# Patient Record
Sex: FEMALE | Race: WHITE | NOT HISPANIC OR LATINO | Employment: FULL TIME | URBAN - METROPOLITAN AREA
[De-identification: names, ages, dates, MRNs, and addresses within clinical notes are randomized per-mention and may not be internally consistent; named-entity substitution may affect disease eponyms.]

---

## 2017-02-27 ENCOUNTER — OFFICE VISIT (OUTPATIENT)
Dept: FAMILY MEDICINE | Facility: CLINIC | Age: 32
End: 2017-02-27
Payer: COMMERCIAL

## 2017-02-27 VITALS
HEIGHT: 67 IN | DIASTOLIC BLOOD PRESSURE: 62 MMHG | SYSTOLIC BLOOD PRESSURE: 100 MMHG | WEIGHT: 153 LBS | TEMPERATURE: 98.8 F | RESPIRATION RATE: 12 BRPM | HEART RATE: 66 BPM | BODY MASS INDEX: 24.01 KG/M2 | OXYGEN SATURATION: 98 %

## 2017-02-27 DIAGNOSIS — K64.5 THROMBOSED EXTERNAL HEMORRHOIDS: Primary | ICD-10-CM

## 2017-02-27 PROCEDURE — 99213 OFFICE O/P EST LOW 20 MIN: CPT | Performed by: FAMILY MEDICINE

## 2017-02-27 NOTE — PROGRESS NOTES
"  SUBJECTIVE:                                                    Susan Connolly is a 31 year old female who presents to clinic today for the following health issues:      Patient presents to the clinic for evaluation of a lump on her perineum area. Patient states that she first noticed the lump this weekend and it has been very painful the last 2 days. Patient denies any drainage from the lump. Denies any fever or chills.    Problem list and histories reviewed & adjusted, as indicated.  Additional history: as documented    Problem list, Medication list, Allergies, and Medical/Social/Surgical histories reviewed in EPIC and updated as appropriate.    ROS:  Constitutional, HEENT, cardiovascular, pulmonary, gi and gu systems are negative, except as otherwise noted.    OBJECTIVE:                                                    /62 (BP Location: Right arm, Patient Position: Chair, Cuff Size: Adult Regular)  Pulse 66  Temp 98.8  F (37.1  C) (Oral)  Resp 12  Ht 5' 6.5\" (1.689 m)  Wt 153 lb (69.4 kg)  SpO2 98%  BMI 24.32 kg/m2  Body mass index is 24.32 kg/(m^2).  GENERAL: healthy, alert and no distress  RECTAL (female): Thrombosed external hemorrhoid measuring approx 4-5mm     ASSESSMENT/PLAN:                                                      1. Thrombosed external hemorrhoids  - Discussed increasing fiber with diet change and supplements  - Get LOTS of water  - Miralax PRN  - OTC Hydrocortisone PRN itch  - Pain now controlled since hemorrhoid is smaller, so no need for analgesia    Follow up if symptoms are worsening or not improving    Heather Jeffery, DO  [facility]    "

## 2017-02-27 NOTE — MR AVS SNAPSHOT
"              After Visit Summary   2/27/2017    Susan Connolly    MRN: [id number]           Patient Information     Date Of Birth          1985        Visit Information        Provider Department      2/27/2017 1:45 PM Heather Jeffery,  [facility]        Today's Diagnoses     Thrombosed external hemorrhoids    -  1       Follow-ups after your visit        Who to contact     If you have questions or need follow up information about today's clinic visit or your schedule please contact [facility] directly at [phone].  Normal or non-critical lab and imaging results will be communicated to you by [employer], letter or phone within 4 business days after the clinic has received the results. If you do not hear from us within 7 days, please contact the clinic through [employer] or phone. If you have a critical or abnormal lab result, we will notify you by phone as soon as possible.  Submit refill requests through [employer] or call your pharmacy and they will forward the refill request to us. Please allow 3 business days for your refill to be completed.          Additional Information About Your Visit        MyChart Information     [employer] gives you secure access to your electronic health record. If you see a primary care provider, you can also send messages to your care team and make appointments. If you have questions, please call your primary care clinic.  If you do not have a primary care provider, please call [phone] and they will assist you.        Care EveryWhere ID     This is your Care EveryWhere ID. This could be used by other organizations to access your [facility] medical records  [id number]        Your Vitals Were     Pulse Temperature Respirations Height Pulse Oximetry BMI (Body Mass Index)    66 98.8  F (37.1  C) (Oral) 12 5' 6.5\" (1.689 m) 98% 24.32 kg/m2       Blood Pressure from Last 3 Encounters:   02/27/17 100/62   04/20/16 108/64   04/08/15 106/72    " Weight from Last 3 Encounters:   02/27/17 153 lb (69.4 kg)   04/20/16 147 lb (66.7 kg)   04/08/15 135 lb 9.6 oz (61.5 kg)              Today, you had the following     No orders found for display       Primary Care Provider Office Phone # Fax #    Heather Jeffery [phone]       [address] [address]        Thank you!     Thank you for choosing [facility]  for your care. Our goal is always to provide you with excellent care. Hearing back from our patients is one way we can continue to improve our services. Please take a few minutes to complete the written survey that you may receive in the mail after your visit with us. Thank you!             Your Updated Medication List - Protect others around you: Learn how to safely use, store and throw away your medicines at www.disposemymeds.org.          This list is accurate as of: 2/27/17  8:19 PM.  Always use your most recent med list.                   Brand Name Dispense Instructions for use    norgestim-eth estrad triphasic 0.18/0.215/0.25 MG-35 MCG per tablet    TRINESSA (28)    84 tablet    Take 1 tablet by mouth daily       VANICREAM SUNSCREEN 7.5-8 % Crea     100 g    Externally apply 2 g topically daily as needed

## 2017-04-24 DIAGNOSIS — Z00.00 ROUTINE GENERAL MEDICAL EXAMINATION AT A HEALTH CARE FACILITY: ICD-10-CM

## 2017-04-24 NOTE — LETTER
[facility]  [address] [place], [address]  [phone]        April 27, 2017      Susan Connolly  [address] [place] [address]        Dear Susan,    We recently received a call from your pharmacy requesting a refill of your medication.    A review of your chart indicates that an appointment is required with your provider for an annual physical. Please call the clinic at [phone] to schedule your appointment.    We have authorized one refill of your medication to allow time for you to schedule your appointment.    Taking care of your health is important to us, and ongoing visits with your provider are vital to your care.  We look forward to seeing you in the near future.    Heather Jeffery/ Sarah MORALZE RN, BSN, PHN

## 2017-04-24 NOTE — TELEPHONE ENCOUNTER
TRINESSA TABLETS 28S  Last Written Prescription Date: 06/26/2016  Last Fill Quantity: 84,01/30/2017  # refills: 3   Last Office Visit with [facility], [facility] or [facility] prescribing provider: 02/27/2017-Dr Jeffery

## 2017-04-27 RX ORDER — NORGESTIMATE-ETHINYL ESTRADIOL 7DAYSX3 28
TABLET ORAL
Qty: 28 TABLET | Refills: 0 | Status: SHIPPED | OUTPATIENT
Start: 2017-04-27 | End: 2017-05-24

## 2017-04-27 NOTE — TELEPHONE ENCOUNTER
Medication is being filled for 1 time refill only due to:  Patient needs to be seen because it has been more than one year since last visit.     Letter sent.     Prescription approved per [facility] Refill Protocol.    Sarah Valenzuela RN, BSN, PHN

## 2017-05-18 ENCOUNTER — OFFICE VISIT (OUTPATIENT)
Dept: FAMILY MEDICINE | Facility: CLINIC | Age: 32
End: 2017-05-18
Payer: COMMERCIAL

## 2017-05-18 VITALS
HEIGHT: 66 IN | HEART RATE: 72 BPM | WEIGHT: 152 LBS | RESPIRATION RATE: 14 BRPM | DIASTOLIC BLOOD PRESSURE: 82 MMHG | TEMPERATURE: 98.1 F | BODY MASS INDEX: 24.43 KG/M2 | OXYGEN SATURATION: 100 % | SYSTOLIC BLOOD PRESSURE: 112 MMHG

## 2017-05-18 DIAGNOSIS — M06.09 RHEUMATOID ARTHRITIS OF MULTIPLE SITES WITH NEGATIVE RHEUMATOID FACTOR (H): ICD-10-CM

## 2017-05-18 DIAGNOSIS — Z00.00 ROUTINE HISTORY AND PHYSICAL EXAMINATION OF ADULT: Primary | ICD-10-CM

## 2017-05-18 PROCEDURE — G0145 SCR C/V CYTO,THINLAYER,RESCR: HCPCS | Performed by: FAMILY MEDICINE

## 2017-05-18 PROCEDURE — 87624 HPV HI-RISK TYP POOLED RSLT: CPT | Performed by: FAMILY MEDICINE

## 2017-05-18 PROCEDURE — 90471 IMMUNIZATION ADMIN: CPT | Performed by: FAMILY MEDICINE

## 2017-05-18 PROCEDURE — 90715 TDAP VACCINE 7 YRS/> IM: CPT | Performed by: FAMILY MEDICINE

## 2017-05-18 PROCEDURE — 99395 PREV VISIT EST AGE 18-39: CPT | Mod: 25 | Performed by: FAMILY MEDICINE

## 2017-05-18 NOTE — NURSING NOTE
"Chief Complaint   Patient presents with     Physical       Initial Ht 5' 6\" (1.676 m)  Wt 152 lb (68.9 kg)  LMP 04/06/2017  BMI 24.53 kg/m2 Estimated body mass index is 24.53 kg/(m^2) as calculated from the following:    Height as of this encounter: 5' 6\" (1.676 m).    Weight as of this encounter: 152 lb (68.9 kg).  Medication Reconciliation: complete   Florence Buckley CMA      "

## 2017-05-18 NOTE — MR AVS SNAPSHOT
After Visit Summary   5/18/2017    Susan Connolly    MRN: [id number]           Patient Information     Date Of Birth          1985        Visit Information        Provider Department      5/18/2017 4:00 PM Heather Jeffery,  [facility]        Today's Diagnoses     Routine history and physical examination of adult    -  1    Rheumatoid arthritis of multiple sites with negative rheumatoid factor (H)          Care Instructions      Preventive Health Recommendations  Female Ages 26 - 39  Yearly exam:   See your health care provider every year in order to    Review health changes.     Discuss preventive care.      Review your medicines if you your doctor has prescribed any.    Until age 30: Get a Pap test every three years (more often if you have had an abnormal result).    After age 30: Talk to your doctor about whether you should have a Pap test every 3 years or have a Pap test with HPV screening every 5 years.   You do not need a Pap test if your uterus was removed (hysterectomy) and you have not had cancer.  You should be tested each year for STDs (sexually transmitted diseases), if you're at risk.   Talk to your provider about how often to have your cholesterol checked.  If you are at risk for diabetes, you should have a diabetes test (fasting glucose).  Shots: Get a flu shot each year. Get a tetanus shot every 10 years.   Nutrition:     Eat at least 5 servings of fruits and vegetables each day.    Eat whole-grain bread, whole-wheat pasta and brown rice instead of white grains and rice.    Talk to your provider about Calcium and Vitamin D.     Lifestyle    Exercise at least 150 minutes a week (30 minutes a day, 5 days of the week). This will help you control your weight and prevent disease.    Limit alcohol to one drink per day.    No smoking.     Wear sunscreen to prevent skin cancer.    See your dentist every six months for an exam and cleaning.          Follow-ups  "after your visit        Who to contact     If you have questions or need follow up information about today's clinic visit or your schedule please contact [facility] directly at [phone].  Normal or non-critical lab and imaging results will be communicated to you by MyChart, letter or phone within 4 business days after the clinic has received the results. If you do not hear from us within 7 days, please contact the clinic through MyChart or phone. If you have a critical or abnormal lab result, we will notify you by phone as soon as possible.  Submit refill requests through [employer] or call your pharmacy and they will forward the refill request to us. Please allow 3 business days for your refill to be completed.          Additional Information About Your Visit        Sidestagehart Information     [employer] gives you secure access to your electronic health record. If you see a primary care provider, you can also send messages to your care team and make appointments. If you have questions, please call your primary care clinic.  If you do not have a primary care provider, please call [phone] and they will assist you.        Care EveryWhere ID     This is your Care EveryWhere ID. This could be used by other organizations to access your [facility] medical records  [id number]        Your Vitals Were     Pulse Temperature Respirations Height Last Period Pulse Oximetry    72 98.1  F (36.7  C) (Oral) 14 5' 6\" (1.676 m) 04/06/2017 100%    BMI (Body Mass Index)                   24.53 kg/m2            Blood Pressure from Last 3 Encounters:   05/18/17 112/82   02/27/17 100/62   04/20/16 108/64    Weight from Last 3 Encounters:   05/18/17 152 lb (68.9 kg)   02/27/17 153 lb (69.4 kg)   04/20/16 147 lb (66.7 kg)              We Performed the Following     Pap imaged thin layer screen with HPV - recommended age 30 - 65 years (select HPV order below)     TDAP VACCINE (ADACEL)        Primary Care Provider Office " Phone # Fax Mirian Jeffery [phone]       [address]  [facility] [address]        Thank you!     Thank you for choosing [facility]  for your care. Our goal is always to provide you with excellent care. Hearing back from our patients is one way we can continue to improve our services. Please take a few minutes to complete the written survey that you may receive in the mail after your visit with us. Thank you!             Your Updated Medication List - Protect others around you: Learn how to safely use, store and throw away your medicines at www.disposemymeds.org.          This list is accurate as of: 5/18/17  4:56 PM.  Always use your most recent med list.                   Brand Name Dispense Instructions for use    TRINESSA (28) 0.18/0.215/0.25 MG-35 MCG per tablet   Generic drug:  norgestim-eth estrad triphasic     28 tablet    TAKE 1 TABLET BY MOUTH DAILY       VANICREAM SUNSCREEN 7.5-8 % Crea     100 g    Externally apply 2 g topically daily as needed

## 2017-05-18 NOTE — PROGRESS NOTES
SUBJECTIVE:     CC: Susan Connolly is an 31 year old woman who presents for preventive health visit.     Healthy Habits:    Do you get at least three servings of calcium containing foods daily (dairy, green leafy vegetables, etc.)? yes    Amount of exercise or daily activities, outside of work: Has a  2-3 day(s) per week    Problems taking medications regularly No    Medication side effects: irregular bleeding 1-2 weeks will bleed one day before period every month    Have you had an eye exam in the past two years? yes    Do you see a dentist twice per year? yes    Do you have sleep apnea, excessive snoring or daytime drowsiness?no        Today's PHQ-2 Score:   PHQ-2 ( 1999 Pfizer) 5/18/2017 2/27/2017   Q1: Little interest or pleasure in doing things 0 0   Q2: Feeling down, depressed or hopeless 0 0   PHQ-2 Score 0 0       Abuse: Current or Past(Physical, Sexual or Emotional)- No  Do you feel safe in your environment - Yes    Social History   Substance Use Topics     Smoking status: Never Smoker     Smokeless tobacco: Never Used     Alcohol use Yes     The patient does not drink >3 drinks per day nor >7 drinks per week.    Recent Labs   Lab Test  07/26/10   0818  06/11/10   0955   CHOL  181  185   HDL   --   60   LDL   --   104   TRIG  97  104   CHOLHDLRATIO   --   3.1       Reviewed orders with patient.  Reviewed health maintenance and updated orders accordingly - Yes    Mammo Decision Support:  Mammogram not appropriate for this patient based on age.    Pertinent mammograms are reviewed under the imaging tab.  History of abnormal Pap smear:   Last 3 Pap Results: April 2016 had a +HPV  PAP (no units)   Date Value   04/20/2016 NIL   04/08/2015 NIL   08/12/2014 LSIL (A)       Reviewed and updated as needed this visit by clinical staff  Tobacco  Allergies  Med Hx  Surg Hx  Fam Hx  Soc Hx        Reviewed and updated as needed this visit by Provider            ROS:  C: NEGATIVE for fever, chills, change  "in weight  I: NEGATIVE for worrisome rashes, moles or lesions  E: NEGATIVE for vision changes or irritation  ENT: NEGATIVE for ear, mouth and throat problems  R: NEGATIVE for significant cough or SOB  B: NEGATIVE for masses, tenderness or discharge  CV: NEGATIVE for chest pain, palpitations or peripheral edema  GI: NEGATIVE for nausea, abdominal pain, heartburn, or change in bowel habits  : NEGATIVE for unusual urinary or vaginal symptoms. Periods are regular.  M: NEGATIVE for significant arthralgias or myalgia  N: NEGATIVE for weakness, dizziness or paresthesias  P: NEGATIVE for changes in mood or affect    Problem list, Medication list, Allergies, and Medical/Social/Surgical histories reviewed in [facility] and updated as appropriate.  OBJECTIVE:     /82 (BP Location: Right arm, Patient Position: Chair, Cuff Size: Adult Regular)  Pulse 72  Temp 98.1  F (36.7  C) (Oral)  Resp 14  Ht 5' 6\" (1.676 m)  Wt 152 lb (68.9 kg)  LMP 04/06/2017  SpO2 100%  BMI 24.53 kg/m2  EXAM:  GENERAL: healthy, alert and no distress  EYES: Eyes grossly normal to inspection, PERRL and conjunctivae and sclerae normal  HENT: ear canals and TM's normal, nose and mouth without ulcers or lesions  NECK: no adenopathy, no asymmetry, masses, or scars and thyroid normal to palpation  RESP: lungs clear to auscultation - no rales, rhonchi or wheezes  BREAST: normal without masses, tenderness or nipple discharge and no palpable axillary masses or adenopathy  CV: regular rate and rhythm, normal S1 S2, no S3 or S4, no murmur, click or rub, no peripheral edema and peripheral pulses strong  ABDOMEN: soft, nontender, no hepatosplenomegaly, no masses and bowel sounds normal   (female): normal female external genitalia, normal urethral meatus, vaginal mucosa pink, moist, well rugated, and normal cervix/adnexa/uterus without masses or discharge  MS: no gross musculoskeletal defects noted, no edema  SKIN: no suspicious lesions or rashes  NEURO: " "Normal strength and tone, mentation intact and speech normal  PSYCH: mentation appears normal, affect normal/bright    ASSESSMENT/PLAN:     1. Routine history and physical examination of adult  - TDAP VACCINE (ADACEL)  - Pap imaged thin layer screen with HPV - recommended age 30 - 65 years (select HPV order below)    2. Rheumatoid arthritis of multiple sites with negative rheumatoid factor (H)  - Patient no longer has issues with this  - Was a problem for 9 months or so when she was a teenager and then went away      COUNSELING:   Reviewed preventive health counseling, as reflected in patient instructions         reports that she has never smoked. She has never used smokeless tobacco.    Estimated body mass index is 24.53 kg/(m^2) as calculated from the following:    Height as of this encounter: 5' 6\" (1.676 m).    Weight as of this encounter: 152 lb (68.9 kg).       Counseling Resources:  ATP IV Guidelines  Pooled Cohorts Equation Calculator  Breast Cancer Risk Calculator  FRAX Risk Assessment  ICSI Preventive Guidelines  Dietary Guidelines for Americans, 2010  USDA's MyPlate  ASA Prophylaxis  Lung CA Screening    Heather Jeffery DO  [facility]"

## 2017-05-23 LAB
COPATH REPORT: NORMAL
PAP: NORMAL

## 2017-05-24 DIAGNOSIS — Z00.00 ROUTINE GENERAL MEDICAL EXAMINATION AT A HEALTH CARE FACILITY: ICD-10-CM

## 2017-05-24 LAB
FINAL DIAGNOSIS: ABNORMAL
HPV HR 12 DNA CVX QL NAA+PROBE: POSITIVE
HPV16 DNA SPEC QL NAA+PROBE: NEGATIVE
HPV18 DNA SPEC QL NAA+PROBE: NEGATIVE
SPECIMEN DESCRIPTION: ABNORMAL

## 2017-05-25 NOTE — TELEPHONE ENCOUNTER
TRINESSA, 28, 0.18/0.215/0.25 MG-35 MCG per tablet      Last Written Prescription Date: 4/27/17  Last Fill Quantity: 28,  # refills: 0   Last Office Visit with G, P or [facility] prescribing provider: Jose D 5/18/17

## 2017-05-26 RX ORDER — NORGESTIMATE-ETHINYL ESTRADIOL 7DAYSX3 28
TABLET ORAL
Qty: 84 TABLET | Refills: 3 | Status: SHIPPED | OUTPATIENT
Start: 2017-05-26 | End: 2018-04-12

## 2017-07-11 ENCOUNTER — OFFICE VISIT (OUTPATIENT)
Dept: OBGYN | Facility: CLINIC | Age: 32
End: 2017-07-11
Payer: COMMERCIAL

## 2017-07-11 VITALS
WEIGHT: 156 LBS | SYSTOLIC BLOOD PRESSURE: 110 MMHG | HEART RATE: 92 BPM | BODY MASS INDEX: 25.18 KG/M2 | DIASTOLIC BLOOD PRESSURE: 66 MMHG

## 2017-07-11 DIAGNOSIS — B97.7 HIGH RISK HUMAN PAPILLOMAVIRUS INFECTION: Primary | ICD-10-CM

## 2017-07-11 PROCEDURE — 57452 EXAM OF CERVIX W/SCOPE: CPT | Performed by: OBSTETRICS & GYNECOLOGY

## 2017-07-11 NOTE — PROGRESS NOTES
Chief Complaint   Patient presents with     Colposcopy     Susan Connolly is a 31 year old female who presents for repeat colposcopy, referred by Dr Jeffery. Pap smear 2 months ago showed: normal +HR HPVThe prior pap showed normal. + HR HPV    Past Medical History:   Diagnosis Date     ASCUS with positive high risk HPV 9/06    + HPV 16 colp - CANDIE I     Contact dermatitis and other eczema, due to unspecified cause      LSIL (low grade squamous intraepithelial lesion) on Pap smear 8/07, 10/13, 7/14, 8/14    +high risk HPV, CANDIE 1 on pathology x 3     Rheumatism, unspecified and fibrositis 2004    no repeat episodes since 5/2004     Family History   Problem Relation Age of Onset     Thyroid Disease Mother      Breast Cancer Maternal Grandmother      DIABETES Maternal Grandfather      prediabetic     CEREBROVASCULAR DISEASE Paternal Grandfather      Alzheimer Disease Paternal Grandfather      C.A.D. No family hx of      Cancer - colorectal No family hx of        Previous history of abnormal paps?: Yes see pap history  History of cryotherapy (freezing)?: : No  History of LEEP: : No     Patient's last menstrual period was 06/30/2017.      History   Smoking Status     Never Smoker   Smokeless Tobacco     Never Used       Allergies as of 07/11/2017 - Sotero as Reviewed 05/18/2017   Allergen Reaction Noted     Amoxicillin  11/28/2003       PROCEDURE:  Before the procedure, it was ensured that the patient was educated regarding the nature of her findings to date, the implications of them, and what was to be done. She has been made aware of the role of HPV, the natural history of infection, ways to minimize her future risk, the effect of HPV on the cervix, and treatment options available should they be indicated. The pathophysiology of the cervix, including a discussion of squamous vs. endometrial cells, and squamous metaplasia have all been reviewed, using illustrations and sketches. The details of the colposcopic procedure were  "reviewed, as well as the risks of missed diagnoses, pain, infection and bleeding. All questions were answered before proceeding, and informed consent was therefore obtained.      Pap repeated?:  No  SCJ seen?:  yes  Endocervical speculum needed?:  No  ECC done?:  No  EndoPap done?:  NO  Lugol's solution used?:  No  Satisfactory examination?:  yes    Vaginal vault: normal to cursory inspection   Urethra normal?:  yes  Labia normal?:  yes  Perineum normal?:  yes  Rectum normal?:  yes    FINDINGS:  Please see image   Cervix: no visible lesions  Procedure: biopsies taken (not including ECC): 0.    Procedure Note:     -cervix visualized with lighted speculum     -painted with vinegar     -colposcopic exam of cervix and upper vagina  All instruments removed from vagina    Assessment: Normal exam without visible pathology      Plan: repeat pap in 12 months    Initial /66 (BP Location: Right arm, Cuff Size: Adult Regular)  Pulse 92  Wt 156 lb (70.8 kg)  LMP 06/30/2017  BMI 25.18 kg/m2 Estimated body mass index is 25.18 kg/(m^2) as calculated from the following:    Height as of 5/18/17: 5' 6\" (1.676 m).    Weight as of this encounter: 156 lb (70.8 kg).  Medication Reconciliation: complete  "

## 2017-07-11 NOTE — MR AVS SNAPSHOT
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  Normal or non-critical lab and imaging results will be communicated to you by MyChart, letter or phone within 4 business days after the clinic has received the results. If you do not hear from us within 7 days, please contact the clinic through [employer] or phone. If you have a critical or abnormal lab result, we will notify you by phone as soon as possible.  Submit refill requests through [employer] or call your pharmacy and they will forward the refill request to us. Please allow 3 business days for your refill to be completed.          Additional Information About Your Visit        MyChart Information     [employer] gives you secure access to your electronic health record. If you see a primary care provider, you can also send messages to your care team and make appointments. If you have questions, please call your primary care clinic.  If you do not have a primary care provider, please call [phone] and they will assist you.        Care EveryWhere ID     This is your Care EveryWhere ID. This could be used by other organizations to access your [place] medical records  [id number]        Your Vitals Were     Pulse Last Period BMI (Body Mass Index)             92 06/30/2017 25.18 kg/m2          Blood Pressure from Last 3 Encounters:   07/11/17 110/66   05/18/17 112/82   02/27/17 100/62    Weight from Last 3 Encounters:   07/11/17 156 lb (70.8 kg)    05/18/17 152 lb (68.9 kg)   02/27/17 153 lb (69.4 kg)              We Performed the Following     COLP CERVIX/UPPER VAGINA        Primary Care Provider Office Phone # Fax Mirian Jeffery [phone]       [address] [address]        Equal Access to Services     MOE DC : Hadii aad ku hadasho Soomaali, waaxda luqadaha, qaybta kaalmada adeegyada, waxay idiin hayaan adeeg kharash la'aan ah. So [facility] [phone].    ATENCIÓN: Si habla español, tiene a torres disposición servicios gratuitos de asistencia lingüística. Evan al [phone].    We comply with applicable federal civil rights laws and [place] laws. We do not discriminate on the basis of race, color, national origin, age, disability sex, sexual orientation or gender identity.            Thank you!     Thank you for choosing [facility]  for your care. Our goal is always to provide you with excellent care. Hearing back from our patients is one way we can continue to improve our services. Please take a few minutes to complete the written survey that you may receive in the mail after your visit with us. Thank you!             Your Updated Medication List - Protect others around you: Learn how to safely use, store and throw away your medicines at www.disposemymeds.org.          This list is accurate as of: 7/11/17  3:25 PM.  Always use your most recent med list.                   Brand Name Dispense Instructions for use Diagnosis    TRINESSA (28) 0.18/0.215/0.25 MG-35 MCG per tablet   Generic drug:  norgestim-eth estrad triphasic     84 tablet    TAKE 1 TABLET BY MOUTH DAILY    Routine general medical examination at a health care facility, Contraception       VANICREAM SUNSCREEN 7.5-8 % Crea     100 g    Externally apply 2 g topically daily as needed    Skin sensitivity

## 2018-04-12 ENCOUNTER — TELEPHONE (OUTPATIENT)
Dept: FAMILY MEDICINE | Facility: CLINIC | Age: 33
End: 2018-04-12

## 2018-04-12 DIAGNOSIS — Z00.00 ROUTINE GENERAL MEDICAL EXAMINATION AT A HEALTH CARE FACILITY: ICD-10-CM

## 2018-04-13 RX ORDER — NORGESTIMATE-ETHINYL ESTRADIOL 7DAYSX3 28
TABLET ORAL
Qty: 84 TABLET | Refills: 0 | Status: SHIPPED | OUTPATIENT
Start: 2018-04-13 | End: 2018-05-21

## 2018-05-01 NOTE — TELEPHONE ENCOUNTER
"Requested Prescriptions   Pending Prescriptions Disp Refills     TRINESSA, 28, 0.18/0.215/0.25 MG-35 MCG per tablet [Pharmacy Med Name: TRINESSA TABLETS 28S]  Last Written Prescription Date:  5/26/2017  Last Fill Quantity: 84 tablet,  # refills: 3   Last office visit: 5/18/2017 with prescribing provider:  Jose D    84 tablet 0     Sig: TAKE 1 TABLET BY MOUTH DAILY    Contraceptives Protocol Passed    4/12/2018 12:05 PM       Passed - Patient is not a current smoker if age is 35 or older       Passed - Recent (12 mo) or future (30 days) visit within the authorizing provider's specialty    Patient had office visit in the last 12 months or has a visit in the next 30 days with authorizing provider or within the authorizing provider's specialty.  See \"Patient Info\" tab in inbasket, or \"Choose Columns\" in Meds & Orders section of the refill encounter.           Passed - No active pregnancy on record       Passed - No positive pregnancy test in past 12 months          "
LM for patient to call back to clinic.   
Medication is being filled for 1 time refill only due to:  Patient needs to be seen because will be due for an annual physical in May, 2018.       Prescription approved per [facility] Refill Protocol.    Sarah LONDONO RN, BSN, PHN  [place] [facility] RN    
Patient scheduled future apt 5/21/18.  
Please assist patient with scheduling an office visit for an annual physical. May 2018    Thank you    Sarah LONDONO RN, BSN, PHN  [place] [facility] RN      
pt will get toradol and xrays

## 2018-05-21 ENCOUNTER — OFFICE VISIT (OUTPATIENT)
Dept: FAMILY MEDICINE | Facility: CLINIC | Age: 33
End: 2018-05-21
Payer: COMMERCIAL

## 2018-05-21 VITALS
BODY MASS INDEX: 25.55 KG/M2 | SYSTOLIC BLOOD PRESSURE: 116 MMHG | TEMPERATURE: 98.8 F | HEIGHT: 66 IN | HEART RATE: 62 BPM | RESPIRATION RATE: 16 BRPM | WEIGHT: 159 LBS | DIASTOLIC BLOOD PRESSURE: 58 MMHG

## 2018-05-21 DIAGNOSIS — Z00.00 ROUTINE GENERAL MEDICAL EXAMINATION AT A HEALTH CARE FACILITY: Primary | ICD-10-CM

## 2018-05-21 DIAGNOSIS — R87.612 PAPANICOLAOU SMEAR OF CERVIX WITH LOW GRADE SQUAMOUS INTRAEPITHELIAL LESION (LGSIL): ICD-10-CM

## 2018-05-21 DIAGNOSIS — B97.7 HIGH RISK HUMAN PAPILLOMAVIRUS INFECTION: ICD-10-CM

## 2018-05-21 DIAGNOSIS — Z30.41 ENCOUNTER FOR SURVEILLANCE OF CONTRACEPTIVE PILLS: ICD-10-CM

## 2018-05-21 PROCEDURE — 99395 PREV VISIT EST AGE 18-39: CPT | Performed by: FAMILY MEDICINE

## 2018-05-21 PROCEDURE — 87624 HPV HI-RISK TYP POOLED RSLT: CPT | Performed by: FAMILY MEDICINE

## 2018-05-21 PROCEDURE — G0145 SCR C/V CYTO,THINLAYER,RESCR: HCPCS | Performed by: FAMILY MEDICINE

## 2018-05-21 NOTE — MR AVS SNAPSHOT
After Visit Summary   5/21/2018    Susan Rubalcava    MRN: [id number]           Patient Information     Date Of Birth          1985        Visit Information        Provider Department      5/21/2018 3:20 PM Heather Jeffery,  [facility]        Today's Diagnoses     Routine general medical examination at a health care facility    -  1    High risk human papillomavirus infection        Papanicolaou smear of cervix with low grade squamous intraepithelial lesion (LGSIL)        Encounter for surveillance of contraceptive pills          Care Instructions      Preventive Health Recommendations  Female Ages 26 - 39  Yearly exam:   See your health care provider every year in order to    Review health changes.     Discuss preventive care.      Review your medicines if you your doctor has prescribed any.    Until age 30: Get a Pap test every three years (more often if you have had an abnormal result).    After age 30: Talk to your doctor about whether you should have a Pap test every 3 years or have a Pap test with HPV screening every 5 years.   You do not need a Pap test if your uterus was removed (hysterectomy) and you have not had cancer.  You should be tested each year for STDs (sexually transmitted diseases), if you're at risk.   Talk to your provider about how often to have your cholesterol checked.  If you are at risk for diabetes, you should have a diabetes test (fasting glucose).  Shots: Get a flu shot each year. Get a tetanus shot every 10 years.   Nutrition:     Eat at least 5 servings of fruits and vegetables each day.    Eat whole-grain bread, whole-wheat pasta and brown rice instead of white grains and rice.    Talk to your provider about Calcium and Vitamin D.     Lifestyle    Exercise at least 150 minutes a week (30 minutes a day, 5 days of the week). This will help you control your weight and prevent disease.    Limit alcohol to one drink per day.    No smoking.  "    Wear sunscreen to prevent skin cancer.    See your dentist every six months for an exam and cleaning.            Follow-ups after your visit        Follow-up notes from your care team     Return in about 1 year (around 5/21/2019) for Physical Exam.      Who to contact     If you have questions or need follow up information about today's clinic visit or your schedule please contact [facility] directly at [phone].  Normal or non-critical lab and imaging results will be communicated to you by [employer], letter or phone within 4 business days after the clinic has received the results. If you do not hear from us within 7 days, please contact the clinic through [employer] or phone. If you have a critical or abnormal lab result, we will notify you by phone as soon as possible.  Submit refill requests through [employer] or call your pharmacy and they will forward the refill request to us. Please allow 3 business days for your refill to be completed.          Additional Information About Your Visit        [employer] Information     [employer] gives you secure access to your electronic health record. If you see a primary care provider, you can also send messages to your care team and make appointments. If you have questions, please call your primary care clinic.  If you do not have a primary care provider, please call [phone] and they will assist you.        Care EveryWhere ID     This is your Care EveryWhere ID. This could be used by other organizations to access your [place] medical records  [id number]        Your Vitals Were     Pulse Temperature Respirations Height Last Period BMI (Body Mass Index)    62 98.8  F (37.1  C) (Oral) 16 5' 6\" (1.676 m) 04/30/2018 (Approximate) 25.66 kg/m2       Blood Pressure from Last 3 Encounters:   05/21/18 116/58   07/11/17 110/66   05/18/17 112/82    Weight from Last 3 Encounters:   05/21/18 159 lb (72.1 kg)   07/11/17 156 lb (70.8 kg)   05/18/17 152 lb (68.9 kg)    "           We Performed the Following     HPV High Risk Types DNA Cervical     Pap imaged thin layer screen with HPV - recommended age 30 - 65 years (select HPV order below)          Today's Medication Changes          These changes are accurate as of 5/21/18  3:51 PM.  If you have any questions, ask your nurse or doctor.               Start taking these medicines.        Dose/Directions    norethindrone-ethinyl estradiol 0.5/0.75/1-35 MG-MCG per tablet   Commonly known as:  ORTHO-NOVUM 7/7/7   Used for:  Encounter for surveillance of contraceptive pills   Started by:  Heather Jeffery DO        Dose:  1 tablet   Take 1 tablet by mouth daily   Quantity:  84 tablet   Refills:  3         Stop taking these medicines if you haven't already. Please contact your care team if you have questions.     TRINESSA (28) 0.18/0.215/0.25 MG-35 MCG per tablet   Generic drug:  norgestim-eth estrad triphasic   Stopped by:  Heather Jeffery DO                Where to get your medicines      Call your pharmacy to confirm that your medication is ready for pickup. It may take up to 24 hours for them to receive the prescription. If the prescription is not ready within 3 business days, please contact your clinic or your provider.     We will let you know when these medications are ready. If you don't hear back within 3 business days, please contact us.     norethindrone-ethinyl estradiol 0.5/0.75/1-35 MG-MCG per tablet                Primary Care Provider Office Phone # Fax #    Heather Jeffery [phone] [address] [facility] [address]        Equal Access to Services     [facility] : Hadvalentino mondragon Soephraim, waaxda luqadaha, qaybta kaalmadeepak gong. So [facility] [phone].    ATENCIÓN: Si habla español, tiene a torres disposición servicios gratuitos de asistencia lingüística. Llame al [phone].    We comply with applicable federal civil rights laws  and [place] laws. We do not discriminate on the basis of race, color, national origin, age, disability, sex, sexual orientation, or gender identity.            Thank you!     Thank you for choosing [facility]  for your care. Our goal is always to provide you with excellent care. Hearing back from our patients is one way we can continue to improve our services. Please take a few minutes to complete the written survey that you may receive in the mail after your visit with us. Thank you!             Your Updated Medication List - Protect others around you: Learn how to safely use, store and throw away your medicines at www.disposemymeds.org.          This list is accurate as of 5/21/18  3:51 PM.  Always use your most recent med list.                   Brand Name Dispense Instructions for use Diagnosis    norethindrone-ethinyl estradiol 0.5/0.75/1-35 MG-MCG per tablet    ORTHO-NOVUM 7/7/7    84 tablet    Take 1 tablet by mouth daily    Encounter for surveillance of contraceptive pills       VANICREAM SUNSCREEN 7.5-8 % Crea     100 g    Externally apply 2 g topically daily as needed    Skin sensitivity

## 2018-05-21 NOTE — PROGRESS NOTES
SUBJECTIVE:   CC: Susan Rubalcava is an 32 year old woman who presents for preventive health visit.     Physical   Annual:     Getting at least 3 servings of Calcium per day::  NO    Bi-annual eye exam::  Yes    Dental care twice a year::  Yes    Sleep apnea or symptoms of sleep apnea::  None    Diet::  Regular (no restrictions)    Frequency of exercise::  1 day/week    Duration of exercise::  Less than 15 minutes    Taking medications regularly::  Yes    Medication side effects::  Not applicable    Additional concerns today::  No            Having spotting and breast pain with her current triphasic OCP          Today's PHQ-2 Score:   PHQ-2 ( 1999 Pfizer) 5/21/2018   Q1: Little interest or pleasure in doing things 0   Q2: Feeling down, depressed or hopeless 0   PHQ-2 Score 0   Q1: Little interest or pleasure in doing things Not at all   Q2: Feeling down, depressed or hopeless Not at all   PHQ-2 Score 0       Abuse: Current or Past(Physical, Sexual or Emotional)- No  Do you feel safe in your environment - Yes    Social History   Substance Use Topics     Smoking status: Never Smoker     Smokeless tobacco: Never Used     Alcohol use Yes     Alcohol Use 5/21/2018   If you drink alcohol do you typically have greater than 3 drinks per day OR greater than 7 drinks per week? No       Reviewed orders with patient.  Reviewed health maintenance and updated orders accordingly - Yes  Patient Active Problem List   Diagnosis     Rheumatoid arthritis of multiple sites with negative rheumatoid factor (H)     Other acne     Other abnormal Papanicolaou smear of cervix and cervical HPV(795.09)     Family history of diabetes mellitus     Family history of other cardiovascular diseases     Papanicolaou smear of cervix with low grade squamous intraepithelial lesion (LGSIL)     CARDIOVASCULAR SCREENING; LDL GOAL LESS THAN 160     Skin sensitivity     High risk human papillomavirus infection     Past Surgical History:   Procedure  "Laterality Date     NO HISTORY OF SURGERY         Social History   Substance Use Topics     Smoking status: Never Smoker     Smokeless tobacco: Never Used     Alcohol use Yes     Family History   Problem Relation Age of Onset     Thyroid Disease Mother      Prostate Cancer Father      Breast Cancer Maternal Grandmother      DIABETES Maternal Grandfather      prediabetic     CEREBROVASCULAR DISEASE Paternal Grandfather      Alzheimer Disease Paternal Grandfather      C.A.D. No family hx of      Cancer - colorectal No family hx of            Mammogram not appropriate for this patient based on age.    Pertinent mammograms are reviewed under the imaging tab.  History of abnormal Pap smear: YES - updated in Problem List and Health Maintenance accordingly    Reviewed and updated as needed this visit by clinical staff  Tobacco  Allergies  Meds  Med Hx  Surg Hx  Fam Hx  Soc Hx        Reviewed and updated as needed this visit by Provider            Review of Systems  CONSTITUTIONAL: NEGATIVE for fever, chills, change in weight  INTEGUMENTARU/SKIN: NEGATIVE for worrisome rashes, moles or lesions  EYES: NEGATIVE for vision changes or irritation  ENT: NEGATIVE for ear, mouth and throat problems  RESP: NEGATIVE for significant cough or SOB  BREAST: NEGATIVE for masses, tenderness or discharge  CV: NEGATIVE for chest pain, palpitations or peripheral edema  GI: NEGATIVE for nausea, abdominal pain, heartburn, or change in bowel habits  : NEGATIVE for unusual urinary or vaginal symptoms. Periods are regular.  MUSCULOSKELETAL: NEGATIVE for significant arthralgias or myalgia  NEURO: NEGATIVE for weakness, dizziness or paresthesias  PSYCHIATRIC: NEGATIVE for changes in mood or affect     OBJECTIVE:   /58 (BP Location: Right arm, Patient Position: Chair, Cuff Size: Adult Regular)  Pulse 62  Temp 98.8  F (37.1  C) (Oral)  Resp 16  Ht 5' 6\" (1.676 m)  Wt 159 lb (72.1 kg)  LMP 04/30/2018 (Approximate)  BMI 25.66 " kg/m2  Physical Exam  GENERAL: healthy, alert and no distress  EYES: Eyes grossly normal to inspection, PERRL and conjunctivae and sclerae normal  HENT: ear canals and TM's normal, nose and mouth without ulcers or lesions  NECK: no adenopathy, no asymmetry, masses, or scars and thyroid normal to palpation  RESP: lungs clear to auscultation - no rales, rhonchi or wheezes  BREAST: normal without masses, tenderness or nipple discharge and no palpable axillary masses or adenopathy  CV: regular rate and rhythm, normal S1 S2, no S3 or S4, no murmur, click or rub, no peripheral edema and peripheral pulses strong  ABDOMEN: soft, nontender, no hepatosplenomegaly, no masses and bowel sounds normal   (female): normal female external genitalia, normal urethral meatus, vaginal mucosa pink, moist, well rugated, and normal cervix/adnexa/uterus without masses or discharge  MS: no gross musculoskeletal defects noted, no edema  SKIN: no suspicious lesions or rashes  NEURO: Normal strength and tone, mentation intact and speech normal  PSYCH: mentation appears normal, affect normal/bright    ASSESSMENT/PLAN:   1. Routine general medical examination at a health care facility    2. High risk human papillomavirus infection  - Pap imaged thin layer screen with HPV - recommended age 30 - 65 years (select HPV order below)  - HPV High Risk Types DNA Cervical    3. Papanicolaou smear of cervix with low grade squamous intraepithelial lesion (LGSIL)  - Pap imaged thin layer screen with HPV - recommended age 30 - 65 years (select HPV order below)  - HPV High Risk Types DNA Cervical    4. Encounter for surveillance of contraceptive pills  - Spotting on current triphasic pill  - Will switch to a different triphasic with different progesterone.  Consider monophasic if that is ineffective   - norethindrone-ethinyl estradiol (ORTHO-NOVUM 7/7/7) 0.5/0.75/1-35 MG-MCG per tablet; Take 1 tablet by mouth daily  Dispense: 84 tablet; Refill:  "3    COUNSELING:  Reviewed preventive health counseling, as reflected in patient instructions         reports that she has never smoked. She has never used smokeless tobacco.    Estimated body mass index is 25.66 kg/(m^2) as calculated from the following:    Height as of this encounter: 5' 6\" (1.676 m).    Weight as of this encounter: 159 lb (72.1 kg).       Counseling Resources:  ATP IV Guidelines  Pooled Cohorts Equation Calculator  Breast Cancer Risk Calculator  FRAX Risk Assessment  ICSI Preventive Guidelines  Dietary Guidelines for Americans, 2010  USDA's MyPlate  ASA Prophylaxis  Lung CA Screening    Heather Jeffery DO  [facility]  Answers for HPI/ROS submitted by the patient on 5/21/2018   PHQ-2 Score: 0    "

## 2018-05-24 LAB
COPATH REPORT: NORMAL
PAP: NORMAL

## 2018-05-29 LAB
FINAL DIAGNOSIS: ABNORMAL
HPV HR 12 DNA CVX QL NAA+PROBE: POSITIVE
HPV16 DNA SPEC QL NAA+PROBE: NEGATIVE
HPV18 DNA SPEC QL NAA+PROBE: NEGATIVE
SPECIMEN DESCRIPTION: ABNORMAL
SPECIMEN SOURCE CVX/VAG CYTO: ABNORMAL

## 2018-07-09 DIAGNOSIS — Z00.00 ROUTINE GENERAL MEDICAL EXAMINATION AT A HEALTH CARE FACILITY: ICD-10-CM

## 2018-07-09 NOTE — TELEPHONE ENCOUNTER
"Requested Prescriptions   Pending Prescriptions Disp Refills     TRINESSA, 28, 0.18/0.215/0.25 MG-35 MCG per tablet [Pharmacy Med Name: TRINESSA TABLETS 28S]  Last Written Prescription Date:  5/21/21018  Last Fill Quantity: 84 tablet,  # refills: 3   Last office visit: 5/21/2018 with prescribing provider:  Jose D      84 tablet 0     Sig: TAKE 1 TABLET BY MOUTH EVERY DAY    Contraceptives Protocol Passed    7/9/2018 10:55 AM       Passed - Patient is not a current smoker if age is 35 or older       Passed - Recent (12 mo) or future (30 days) visit within the authorizing provider's specialty    Patient had office visit in the last 12 months or has a visit in the next 30 days with authorizing provider or within the authorizing provider's specialty.  See \"Patient Info\" tab in inbasket, or \"Choose Columns\" in Meds & Orders section of the refill encounter.           Passed - No active pregnancy on record       Passed - No positive pregnancy test in past 12 months          "

## 2018-07-11 RX ORDER — NORGESTIMATE-ETHINYL ESTRADIOL 7DAYSX3 28
TABLET ORAL
Qty: 84 TABLET | Refills: 3 | Status: SHIPPED | OUTPATIENT
Start: 2018-07-11 | End: 2019-05-17

## 2018-07-11 NOTE — TELEPHONE ENCOUNTER
Krista called to verify RX.  Advised Trinessa DC'd and is on Ortho Novum 7/7/7.  Tamera Webb, RN    5/21/18  ASSESSMENT/PLAN:   1. Routine general medical examination at a health care facility     2. High risk human papillomavirus infection  - Pap imaged thin layer screen with HPV - recommended age 30 - 65 years (select HPV order below)  - HPV High Risk Types DNA Cervical     3. Papanicolaou smear of cervix with low grade squamous intraepithelial lesion (LGSIL)  - Pap imaged thin layer screen with HPV - recommended age 30 - 65 years (select HPV order below)  - HPV High Risk Types DNA Cervical     4. Encounter for surveillance of contraceptive pills  - Spotting on current triphasic pill  - Will switch to a different triphasic with different progesterone.  Consider monophasic if that is ineffective   - norethindrone-ethinyl estradiol (ORTHO-NOVUM 7/7/7) 0.5/0.75/1-35 MG-MCG per tablet; Take 1 tablet by mouth daily  Dispense: 84 tablet; Refill: 3

## 2018-07-11 NOTE — TELEPHONE ENCOUNTER
Duplicate  Prescription approved per [facility] Refill Protocol.  Selena Lazo RN, BSN

## 2018-09-18 NOTE — TELEPHONE ENCOUNTER
Prescription approved per [facility] Refill Protocol.  Germaine Valdez RN, BSN     Event Note

## 2019-05-14 ASSESSMENT — ENCOUNTER SYMPTOMS
SHORTNESS OF BREATH: 0
ABDOMINAL PAIN: 0
DYSURIA: 0
MYALGIAS: 0
JOINT SWELLING: 0
COUGH: 0
NERVOUS/ANXIOUS: 0
HEMATURIA: 0
HEMATOCHEZIA: 0
NAUSEA: 0
PARESTHESIAS: 0
SORE THROAT: 0
PALPITATIONS: 0
ARTHRALGIAS: 0
HEADACHES: 0
EYE PAIN: 0
BREAST MASS: 0
FREQUENCY: 0
WEAKNESS: 0
HEARTBURN: 0
CONSTIPATION: 0
FEVER: 0
DIARRHEA: 0
CHILLS: 0
DIZZINESS: 0

## 2019-05-17 ENCOUNTER — OFFICE VISIT (OUTPATIENT)
Dept: PEDIATRICS | Facility: CLINIC | Age: 34
End: 2019-05-17
Payer: COMMERCIAL

## 2019-05-17 VITALS
HEIGHT: 66 IN | SYSTOLIC BLOOD PRESSURE: 104 MMHG | OXYGEN SATURATION: 99 % | WEIGHT: 157.2 LBS | DIASTOLIC BLOOD PRESSURE: 66 MMHG | TEMPERATURE: 97.5 F | BODY MASS INDEX: 25.26 KG/M2 | HEART RATE: 80 BPM

## 2019-05-17 DIAGNOSIS — Z00.00 ENCOUNTER FOR ROUTINE HISTORY AND PHYSICAL EXAMINATION OF ADULT: Primary | ICD-10-CM

## 2019-05-17 DIAGNOSIS — Z12.4 SCREENING FOR MALIGNANT NEOPLASM OF CERVIX: ICD-10-CM

## 2019-05-17 DIAGNOSIS — R87.612 PAPANICOLAOU SMEAR OF CERVIX WITH LOW GRADE SQUAMOUS INTRAEPITHELIAL LESION (LGSIL): ICD-10-CM

## 2019-05-17 PROCEDURE — G0145 SCR C/V CYTO,THINLAYER,RESCR: HCPCS | Performed by: INTERNAL MEDICINE

## 2019-05-17 PROCEDURE — 99395 PREV VISIT EST AGE 18-39: CPT | Performed by: INTERNAL MEDICINE

## 2019-05-17 PROCEDURE — 87624 HPV HI-RISK TYP POOLED RSLT: CPT | Performed by: INTERNAL MEDICINE

## 2019-05-17 ASSESSMENT — ENCOUNTER SYMPTOMS
DIZZINESS: 0
EYE PAIN: 0
HEMATURIA: 0
ABDOMINAL PAIN: 0
HEMATOCHEZIA: 0
HEARTBURN: 0
JOINT SWELLING: 0
BREAST MASS: 0
NERVOUS/ANXIOUS: 0
PALPITATIONS: 0
CHILLS: 0
DYSURIA: 0
MYALGIAS: 0
NAUSEA: 0
FREQUENCY: 0
SORE THROAT: 0
WEAKNESS: 0
HEADACHES: 0
COUGH: 0
CONSTIPATION: 0
FEVER: 0
ARTHRALGIAS: 0
DIARRHEA: 0
PARESTHESIAS: 0
SHORTNESS OF BREATH: 0

## 2019-05-17 ASSESSMENT — MIFFLIN-ST. JEOR: SCORE: 1434.8

## 2019-05-17 NOTE — PROGRESS NOTES
SUBJECTIVE:   CC: Susan Rubalcava is an 33 year old woman who presents for preventive health visit.     Healthy Habits:     Getting at least 3 servings of Calcium per day:  Yes    Bi-annual eye exam:  Yes    Dental care twice a year:  Yes    Sleep apnea or symptoms of sleep apnea:  None    Diet:  Regular (no restrictions)    Frequency of exercise:  1 day/week    Duration of exercise:  Less than 15 minutes    Taking medications regularly:  Yes    Medication side effects:  Not applicable    PHQ-2 Total Score: 0    Additional concerns today:  Yes (lightheaded tingling and vision went black yesterday)  Yesterday in the morning went to go feed the dogs and felt lightheaded and like was going to pass out.  Happened again in the shower.  Today feels fine.  LMP beginning of month and on the OCP.  Menses are minimal.        Today's PHQ-2 Score:   PHQ-2 ( 1999 [employer]) 5/14/2019   Q1: Little interest or pleasure in doing things 0   Q2: Feeling down, depressed or hopeless 0   PHQ-2 Score 0   Q1: Little interest or pleasure in doing things Not at all   Q2: Feeling down, depressed or hopeless Not at all   PHQ-2 Score 0       Abuse: Current or Past(Physical, Sexual or Emotional)- No  Do you feel safe in your environment? Yes    Social History     Tobacco Use     Smoking status: Never Smoker     Smokeless tobacco: Never Used   Substance Use Topics     Alcohol use: Yes         Alcohol Use 5/14/2019   Prescreen: >3 drinks/day or >7 drinks/week? No   Prescreen: >3 drinks/day or >7 drinks/week? -       Reviewed orders with patient.  Reviewed health maintenance and updated orders accordingly - Yes      Mammogram not appropriate for this patient based on age.    Pertinent mammograms are reviewed under the imaging tab.  History of abnormal Pap smear: YES - updated in Problem List and Health Maintenance accordingly  PAP / HPV Latest Ref Rng & Units 5/17/2019 5/21/2018 5/18/2017   PAP - NIL NIL NIL   HPV 16 DNA NEG:Negative - Negative  "Negative   HPV 18 DNA NEG:Negative - Negative Negative   OTHER HR HPV NEG:Negative - Positive(A) Positive(A)     Reviewed and updated as needed this visit by clinical staff  Tobacco  Allergies  Meds  Problems  Med Hx  Surg Hx  Fam Hx  Soc Hx          Reviewed and updated as needed this visit by Provider  Tobacco  Allergies  Meds  Problems  Med Hx  Surg Hx  Fam Hx            Review of Systems   Constitutional: Negative for chills and fever.   HENT: Negative for congestion, ear pain, hearing loss and sore throat.    Eyes: Negative for pain and visual disturbance.   Respiratory: Negative for cough and shortness of breath.    Cardiovascular: Negative for chest pain, palpitations and peripheral edema.   Gastrointestinal: Negative for abdominal pain, constipation, diarrhea, heartburn, hematochezia and nausea.   Breasts:  Negative for tenderness, breast mass and discharge.   Genitourinary: Negative for dysuria, frequency, genital sores, hematuria, pelvic pain, urgency, vaginal bleeding and vaginal discharge.   Musculoskeletal: Negative for arthralgias, joint swelling and myalgias.   Skin: Negative for rash.   Neurological: Negative for dizziness, weakness, headaches and paresthesias.   Psychiatric/Behavioral: Negative for mood changes. The patient is not nervous/anxious.           OBJECTIVE:   /66 (BP Location: Right arm, Patient Position: Chair, Cuff Size: Adult Regular)   Pulse 80   Temp 97.5  F (36.4  C) (Tympanic)   Ht 1.676 m (5' 6\")   Wt 71.3 kg (157 lb 3.2 oz)   SpO2 99%   BMI 25.37 kg/m    Physical Exam   Constitutional: She is oriented to person, place, and time. She appears well-developed and well-nourished. No distress.   HENT:   Right Ear: Tympanic membrane and external ear normal.   Left Ear: Tympanic membrane and external ear normal.   Nose: Nose normal.   Mouth/Throat: Oropharynx is clear and moist. No oropharyngeal exudate.   Eyes: Pupils are equal, round, and reactive to light. " Conjunctivae are normal. Right eye exhibits no discharge. Left eye exhibits no discharge.   Neck: Neck supple. No tracheal deviation present. No thyromegaly present.   Cardiovascular: Normal rate, regular rhythm, S1 normal, S2 normal, normal heart sounds and normal pulses. Exam reveals no S3, no S4 and no friction rub.   No murmur heard.  Pulmonary/Chest: Effort normal and breath sounds normal. No respiratory distress. She has no wheezes. She has no rales.   Abdominal: Soft. Bowel sounds are normal. She exhibits no mass. There is no hepatosplenomegaly. There is no tenderness.   Genitourinary: Vagina normal. Cervix exhibits no motion tenderness and no discharge. No vaginal discharge found.   Musculoskeletal: Normal range of motion. She exhibits no edema.   Lymphadenopathy:     She has no cervical adenopathy.   Neurological: She is alert and oriented to person, place, and time. She has normal strength and normal reflexes. She exhibits normal muscle tone.   Skin: Skin is warm and dry. No rash noted.   Psychiatric: She has a normal mood and affect. Judgment and thought content normal. Cognition and memory are normal.       ASSESSMENT/PLAN:   1. Encounter for routine history and physical examination of adult  Routine health education discussed: calcium, diet, exercise, weight, safety. Preconception counseling discussed.  Discussed lightheaded episode, likely vasovagal.  Avoid dehydration, stand slowly and follow-up if recurrs.    2. Screening for malignant neoplasm of cervix  Pap today    3. Papanicolaou smear of cervix with low grade squamous intraepithelial lesion (LGSIL)    - Pap imaged thin layer screen with HPV - recommended age 30 - 65 years (select HPV order below)  - HPV High Risk Types DNA Cervical    COUNSELING:  Reviewed preventive health counseling, as reflected in patient instructions    Estimated body mass index is 25.37 kg/m  as calculated from the following:    Height as of this encounter: 1.676 m (5'  "6\").    Weight as of this encounter: 71.3 kg (157 lb 3.2 oz).         reports that she has never smoked. She has never used smokeless tobacco.      Counseling Resources:  ATP IV Guidelines  Pooled Cohorts Equation Calculator  Breast Cancer Risk Calculator  FRAX Risk Assessment  ICSI Preventive Guidelines  Dietary Guidelines for Americans, 2010  USDA's MyPlate  ASA Prophylaxis  Lung CA Screening    Swathi Bagley MD  [facility]  "

## 2019-05-21 LAB
COPATH REPORT: NORMAL
PAP: NORMAL

## 2019-06-04 ENCOUNTER — OFFICE VISIT (OUTPATIENT)
Dept: OBGYN | Facility: CLINIC | Age: 34
End: 2019-06-04
Payer: COMMERCIAL

## 2019-06-04 VITALS
WEIGHT: 159 LBS | BODY MASS INDEX: 25.55 KG/M2 | DIASTOLIC BLOOD PRESSURE: 64 MMHG | HEIGHT: 66 IN | SYSTOLIC BLOOD PRESSURE: 102 MMHG

## 2019-06-04 DIAGNOSIS — N87.0 MILD DYSPLASIA OF CERVIX: ICD-10-CM

## 2019-06-04 DIAGNOSIS — Z01.812 PRE-PROCEDURE LAB EXAM: ICD-10-CM

## 2019-06-04 DIAGNOSIS — Z11.3 SCREEN FOR STD (SEXUALLY TRANSMITTED DISEASE): ICD-10-CM

## 2019-06-04 DIAGNOSIS — B97.7 HIGH RISK HUMAN PAPILLOMAVIRUS INFECTION: Primary | ICD-10-CM

## 2019-06-04 LAB — HCG UR QL: NEGATIVE

## 2019-06-04 PROCEDURE — 87491 CHLMYD TRACH DNA AMP PROBE: CPT | Performed by: OBSTETRICS & GYNECOLOGY

## 2019-06-04 PROCEDURE — 88305 TISSUE EXAM BY PATHOLOGIST: CPT | Performed by: OBSTETRICS & GYNECOLOGY

## 2019-06-04 PROCEDURE — 57454 BX/CURETT OF CERVIX W/SCOPE: CPT | Performed by: OBSTETRICS & GYNECOLOGY

## 2019-06-04 PROCEDURE — 87591 N.GONORRHOEAE DNA AMP PROB: CPT | Performed by: OBSTETRICS & GYNECOLOGY

## 2019-06-04 PROCEDURE — 81025 URINE PREGNANCY TEST: CPT | Performed by: OBSTETRICS & GYNECOLOGY

## 2019-06-04 ASSESSMENT — MIFFLIN-ST. JEOR: SCORE: 1442.97

## 2019-06-04 NOTE — PROGRESS NOTES
Colposcopy Note    Past History:     Patient's last menstrual period was 2019 (exact date).  Patient is not pregnant.     Previous Abnormal Pap Hx:  Abnormal Pap dated: May / 17 / 2019 Pap-WNL, Pos non-16/18 HPV (same as 18)  Prior Colposcopy dated: 13 - CANDIE 1  Prior Treatment dated: N/A    Indications:  Encounter Diagnoses   Name Primary?     High risk human papillomavirus infection Yes     Pre-procedure lab exam        PROCEDURE:  The procedure was explained, and informed consent obtained. The patient was placed in the dorsal lithotomy position. A vaginal speculum was placed. A GC/Chlamydia culture was obtained. Dilute acetic acid was applied to cervix. The exocervix was visualized. The transformation zone was visualized satisfactorily. Colposcopy was done with white light and with the Guzman light. Endocervical curettage was done. Biopsy done at the 6 o clock position(s) Colposcopy of vagina revealed: normal. The patient tolerated the procedure well. At the completion of the procedure, only scant bleeding was present. Hemostasis was achieved with Monsel's solution.    FINDINGS:  White epithelium @ 6 o clock position(s).  Punctation: absent  Mosaicism: absent    Physical Exam   Genitourinary:           ASSESSMENT:  Encounter Diagnoses   Name Primary?     High risk human papillomavirus infection Yes     Pre-procedure lab exam        PLAN:  If Bx shows CANDIE 1 or less, follow-up with Ob/Gyn per ASCCP Guideline in 1 year for Pap & HPV cotest at next annual exam.   The patient has given me explicit permission to leave a detailed message via [employer] if able.    Procedure Planned:   If HGSIL, consider Laser vaporization.      Robin Mcmanus MD

## 2019-06-04 NOTE — NURSING NOTE
"Chief Complaint   Patient presents with     Colposcopy     +HR HPV        Initial /64 (BP Location: Right arm, Patient Position: Sitting, Cuff Size: Adult Regular)   Ht 1.676 m (5' 6\")   Wt 72.1 kg (159 lb)   LMP 2019 (Exact Date)   BMI 25.66 kg/m   Estimated body mass index is 25.66 kg/m  as calculated from the following:    Height as of this encounter: 1.676 m (5' 6\").    Weight as of this encounter: 72.1 kg (159 lb).  BP completed using cuff size: regular    Questioned patient about current smoking habits.  Pt. has never smoked.          The following HM Due: NONE    Joss Joseph CMA                "

## 2019-06-06 LAB
C TRACH DNA SPEC QL NAA+PROBE: NEGATIVE
COPATH REPORT: NORMAL
N GONORRHOEA DNA SPEC QL NAA+PROBE: NEGATIVE
SPECIMEN SOURCE: NORMAL
SPECIMEN SOURCE: NORMAL

## 2019-06-17 PROBLEM — N87.0 MILD DYSPLASIA OF CERVIX: Status: ACTIVE | Noted: 2019-06-04

## 2019-06-17 NOTE — RESULT ENCOUNTER NOTE
Please advise patient her GC/Chlam were negative as expected, but her colposcopic biopsy from her cervix did show mild dysplasia.  This is not cervical cancer.  It is a very mild pre-cancerous change that usually will resolve on its own without treatment in the course of a year or so.  She needs to follow up in 1 year for her annual exam, and at that time a Pap with HPV DNA test will be done again from her cervix to check to see if the cervix cells are normal and the HPV virus has been eradicated.  If so she will resume routine screening 3 years later.  She can let me know if she has any other questions.    Robin Mcmanus MD

## 2019-08-17 ENCOUNTER — OFFICE VISIT (OUTPATIENT)
Dept: FAMILY MEDICINE | Facility: CLINIC | Age: 34
End: 2019-08-17
Payer: COMMERCIAL

## 2019-08-17 VITALS
SYSTOLIC BLOOD PRESSURE: 110 MMHG | TEMPERATURE: 97.5 F | WEIGHT: 157 LBS | HEART RATE: 84 BPM | RESPIRATION RATE: 12 BRPM | BODY MASS INDEX: 25.34 KG/M2 | DIASTOLIC BLOOD PRESSURE: 64 MMHG

## 2019-08-17 DIAGNOSIS — Z32.01 PREGNANCY TEST PERFORMED, PREGNANCY CONFIRMED: Primary | ICD-10-CM

## 2019-08-17 DIAGNOSIS — Z3A.01 LESS THAN 8 WEEKS GESTATION OF PREGNANCY: ICD-10-CM

## 2019-08-17 LAB — HCG SERPL QL: POSITIVE

## 2019-08-17 PROCEDURE — 84703 CHORIONIC GONADOTROPIN ASSAY: CPT | Performed by: PHYSICIAN ASSISTANT

## 2019-08-17 PROCEDURE — 99213 OFFICE O/P EST LOW 20 MIN: CPT | Performed by: PHYSICIAN ASSISTANT

## 2019-08-17 NOTE — PATIENT INSTRUCTIONS
Congratulations!     Estimated due date is 4/25/2020    4 weeks 0 days                Patient Education     Pregnancy    Your exam today shows that you are pregnant.  Pregnancy symptoms  During pregnancy your body s hormones change. This causes physical and emotional changes. This is normal. Knowing what to expect is important for your piece of mind and so you know when to seek help for a problem. Here are some of the most common symptoms:    Morning sickness or nausea. This can happen any time of the day or night.    Tender, swollen breasts    Need to urinate frequently    Tiredness or fatigue    Dizziness    Indigestion or heartburn    Food cravings or turn-offs    Constipation    Emotional changes. This can range from anxiety to excitement to depression.  General care for a healthy pregnancy  Here are things you can do to help make sure your baby is born healthy:    Rest when you feel tired. This is especially true in the later months of pregnancy.    Drink more fluids. Your body needs more fluids than you may be used to. Drink 8 to10 glasses of juice, milk, or water every day.    Eat well-balanced meals. Eat at regular times to give your body enough protein. You can expect to gain about 30 pounds during the pregnancy. Don t try to diet or lose weight while you are pregnant.    Take a prenatal vitamin every day. This helps you meet the extra nutritional needs of pregnancy.    Don t take any other medicine during your pregnancy unless your healthcare provider tells you to. This includes prescription medicines and those you buy over the counter. Many medicines can harm the growing baby.    If you have nausea or vomiting, don t eat greasy or fried foods. Eat several smaller meals throughout the day rather than 3 large meals.    If you smoke, you must stop. The nicotine you breathe in goes right to the baby.    Stay away from alcohol, even in moderate amounts. Daily drinking will harm your baby and can cause  permanent brain damage.    Don t use recreational drugs, especially cocaine, crack, and heroin. These will harm your baby. Also avoid marijuana.    If you were using recreational drugs or prescribed medicine when you found out that you were pregnant, talk with your healthcare provider about possible effects on your growing baby.    If you have medical problems that you need to take medicine for, talk with your healthcare provider.  Follow-up care  Call your healthcare provider to arrange for prenatal care. Prenatal care is important. You can see your family provider, a pregnancy specialist (obstetrician), or a primary care clinic.  When to seek medical advice  Call your healthcare provider right away if any of these occur:    Vaginal bleeding    Pain in your belly (abdomen) or back that is moderate or severe    Lots of vomiting, or you can t keep any fluids down for 6 hours    Burning feeling when you urinate    Headache, dizziness, or rapid weight gain    Fever    Vision changes or blurred vision  Date Last Reviewed: 10/1/2016    8399-8164 [employer]. [address] [place] [address], [place] [address]. All rights reserved. This information is not intended as a substitute for professional medical care. Always follow your healthcare professional's instructions.

## 2019-08-17 NOTE — PROGRESS NOTES
Subjective     Susan Rubalcava is a 33 year old female who presents to clinic today for the following health issues:    HPI   Wants HCG confirmation. She took 2 tests at home today which were both positive. She is trying to get pregnant with first child. No previous pregnancies. No symptoms including breast tenderness, and nausea. LMP 7/20/19.      Patient Active Problem List   Diagnosis     Other abnormal Papanicolaou smear of cervix and cervical HPV(795.09)     Papanicolaou smear of cervix with low grade squamous intraepithelial lesion (LGSIL)     CARDIOVASCULAR SCREENING; LDL GOAL LESS THAN 160     Skin sensitivity     High risk human papillomavirus infection     Mild dysplasia of cervix     Past Surgical History:   Procedure Laterality Date     NO HISTORY OF SURGERY         Social History     Tobacco Use     Smoking status: Never Smoker     Smokeless tobacco: Never Used   Substance Use Topics     Alcohol use: Yes     Family History   Problem Relation Age of Onset     Thyroid Disease Mother      Prostate Cancer Father      Breast Cancer Maternal Grandmother      Diabetes Maternal Grandfather         in70's     Heart Disease Paternal Grandmother      Rheumatoid Arthritis Paternal Grandmother      Cerebrovascular Disease Paternal Grandfather      Alzheimer Disease Paternal Grandfather      C.A.D. No family hx of      Cancer - colorectal No family hx of          No current outpatient medications on file.     Allergies   Allergen Reactions     Amoxicillin          Reviewed and updated as needed this visit by Provider         Review of Systems   ROS COMP: Constitutional, HEENT, cardiovascular, pulmonary, gi and gu systems are negative, except as otherwise noted.      Objective    /64 (BP Location: Right arm, Patient Position: Chair, Cuff Size: Adult Regular)   Pulse 84   Temp 97.5  F (36.4  C) (Oral)   Resp 12   Wt 71.2 kg (157 lb)   BMI 25.34 kg/m    Body mass index is 25.34 kg/m .         Physical Exam    GENERAL: healthy, alert and no distress  EYES: Eyes grossly normal to inspection, PERRL and conjunctivae and sclerae normal  RESP: lungs clear to auscultation - no rales, rhonchi or wheezes  CV: regular rate and rhythm, normal S1 S2, no S3 or S4, no murmur, click or rub, no peripheral edema and peripheral pulses strong  MS: no gross musculoskeletal defects noted, no edema  SKIN: no suspicious lesions or rashes  NEURO: Normal strength and tone, mentation intact and speech normal  PSYCH: mentation appears normal, affect normal/bright    Diagnostic Test Results:  Results for orders placed or performed in visit on 08/17/19 (from the past 24 hour(s))   HCG qualitative Blood   Result Value Ref Range    HCG Qualitative Serum Positive (A) NEG^Negative           Assessment & Plan     (Z32.01) Pregnancy test performed, pregnancy confirmed  (primary encounter diagnosis)    Comment: Pregnancy test positive. Based on pregnancy wheel and LMP, DAVIN 4/25/2020. 4 weeks 0 days.    Plan: HCG qualitative Blood, CANCELED: HCG Qual,         Urine ([id number])            (Z3A.01) Less than 8 weeks gestation of pregnancy    Comment: See above.    Plan: Will have her see RN for education and Dr. MCKAYLA Damon for OB.           Patient Instructions   Congratulations!     Estimated due date is 4/25/2020    4 weeks 0 days                Patient Education     Pregnancy    Your exam today shows that you are pregnant.  Pregnancy symptoms  During pregnancy your body s hormones change. This causes physical and emotional changes. This is normal. Knowing what to expect is important for your piece of mind and so you know when to seek help for a problem. Here are some of the most common symptoms:    Morning sickness or nausea. This can happen any time of the day or night.    Tender, swollen breasts    Need to urinate frequently    Tiredness or fatigue    Dizziness    Indigestion or heartburn    Food cravings or turn-offs    Constipation    Emotional changes.  This can range from anxiety to excitement to depression.  General care for a healthy pregnancy  Here are things you can do to help make sure your baby is born healthy:    Rest when you feel tired. This is especially true in the later months of pregnancy.    Drink more fluids. Your body needs more fluids than you may be used to. Drink 8 to10 glasses of juice, milk, or water every day.    Eat well-balanced meals. Eat at regular times to give your body enough protein. You can expect to gain about 30 pounds during the pregnancy. Don t try to diet or lose weight while you are pregnant.    Take a prenatal vitamin every day. This helps you meet the extra nutritional needs of pregnancy.    Don t take any other medicine during your pregnancy unless your healthcare provider tells you to. This includes prescription medicines and those you buy over the counter. Many medicines can harm the growing baby.    If you have nausea or vomiting, don t eat greasy or fried foods. Eat several smaller meals throughout the day rather than 3 large meals.    If you smoke, you must stop. The nicotine you breathe in goes right to the baby.    Stay away from alcohol, even in moderate amounts. Daily drinking will harm your baby and can cause permanent brain damage.    Don t use recreational drugs, especially cocaine, crack, and heroin. These will harm your baby. Also avoid marijuana.    If you were using recreational drugs or prescribed medicine when you found out that you were pregnant, talk with your healthcare provider about possible effects on your growing baby.    If you have medical problems that you need to take medicine for, talk with your healthcare provider.  Follow-up care  Call your healthcare provider to arrange for prenatal care. Prenatal care is important. You can see your family provider, a pregnancy specialist (obstetrician), or a primary care clinic.  When to seek medical advice  Call your healthcare provider right away if any of  these occur:    Vaginal bleeding    Pain in your belly (abdomen) or back that is moderate or severe    Lots of vomiting, or you can t keep any fluids down for 6 hours    Burning feeling when you urinate    Headache, dizziness, or rapid weight gain    Fever    Vision changes or blurred vision  Date Last Reviewed: 10/1/2016    [id number] [employer]. [address] [place] [address], [place] [address]. All rights reserved. This information is not intended as a substitute for professional medical care. Always follow your healthcare professional's instructions.               No follow-ups on file.    Joss Alejandro PA-C  [facility]

## 2019-08-28 ENCOUNTER — TELEPHONE (OUTPATIENT)
Dept: FAMILY MEDICINE | Facility: CLINIC | Age: 34
End: 2019-08-28

## 2019-08-28 ENCOUNTER — PRENATAL OFFICE VISIT (OUTPATIENT)
Dept: NURSING | Facility: CLINIC | Age: 34
End: 2019-08-28
Payer: COMMERCIAL

## 2019-08-28 VITALS
DIASTOLIC BLOOD PRESSURE: 73 MMHG | WEIGHT: 160.5 LBS | OXYGEN SATURATION: 100 % | BODY MASS INDEX: 25.91 KG/M2 | HEART RATE: 77 BPM | RESPIRATION RATE: 18 BRPM | SYSTOLIC BLOOD PRESSURE: 121 MMHG

## 2019-08-28 DIAGNOSIS — Z33.1 PREGNANCY, INCIDENTAL: Primary | ICD-10-CM

## 2019-08-28 DIAGNOSIS — Z32.01 PREGNANCY TEST PERFORMED, PREGNANCY CONFIRMED: Primary | ICD-10-CM

## 2019-08-28 LAB
ABO + RH BLD: NORMAL
ABO + RH BLD: NORMAL
BLD GP AB SCN SERPL QL: NORMAL
BLOOD BANK CMNT PATIENT-IMP: NORMAL
ERYTHROCYTE [DISTWIDTH] IN BLOOD BY AUTOMATED COUNT: 13.4 % (ref 10–15)
HCT VFR BLD AUTO: 37.4 % (ref 35–47)
HGB BLD-MCNC: 12.3 G/DL (ref 11.7–15.7)
MCH RBC QN AUTO: 29.5 PG (ref 26.5–33)
MCHC RBC AUTO-ENTMCNC: 32.9 G/DL (ref 31.5–36.5)
MCV RBC AUTO: 90 FL (ref 78–100)
PLATELET # BLD AUTO: 324 10E9/L (ref 150–450)
RBC # BLD AUTO: 4.17 10E12/L (ref 3.8–5.2)
SPECIMEN EXP DATE BLD: NORMAL
WBC # BLD AUTO: 7.8 10E9/L (ref 4–11)

## 2019-08-28 PROCEDURE — 87389 HIV-1 AG W/HIV-1&-2 AB AG IA: CPT | Performed by: FAMILY MEDICINE

## 2019-08-28 PROCEDURE — 86900 BLOOD TYPING SEROLOGIC ABO: CPT | Performed by: FAMILY MEDICINE

## 2019-08-28 PROCEDURE — 86901 BLOOD TYPING SEROLOGIC RH(D): CPT | Performed by: FAMILY MEDICINE

## 2019-08-28 PROCEDURE — 87491 CHLMYD TRACH DNA AMP PROBE: CPT | Performed by: FAMILY MEDICINE

## 2019-08-28 PROCEDURE — 86762 RUBELLA ANTIBODY: CPT | Performed by: FAMILY MEDICINE

## 2019-08-28 PROCEDURE — 87591 N.GONORRHOEAE DNA AMP PROB: CPT | Performed by: FAMILY MEDICINE

## 2019-08-28 PROCEDURE — 99207 ZZC NO CHARGE NURSE ONLY: CPT

## 2019-08-28 PROCEDURE — 87086 URINE CULTURE/COLONY COUNT: CPT | Performed by: FAMILY MEDICINE

## 2019-08-28 PROCEDURE — 85027 COMPLETE CBC AUTOMATED: CPT | Performed by: FAMILY MEDICINE

## 2019-08-28 PROCEDURE — 86850 RBC ANTIBODY SCREEN: CPT | Performed by: FAMILY MEDICINE

## 2019-08-28 PROCEDURE — 86780 TREPONEMA PALLIDUM: CPT | Performed by: FAMILY MEDICINE

## 2019-08-28 PROCEDURE — 87340 HEPATITIS B SURFACE AG IA: CPT | Performed by: FAMILY MEDICINE

## 2019-08-28 PROCEDURE — 36415 COLL VENOUS BLD VENIPUNCTURE: CPT | Performed by: FAMILY MEDICINE

## 2019-08-28 RX ORDER — PRENATAL VIT/IRON FUM/FOLIC AC 27MG-0.8MG
1 TABLET ORAL DAILY
Qty: 90 TABLET | Refills: 3 | COMMUNITY
Start: 2019-08-28 | End: 2020-06-12

## 2019-08-28 NOTE — NURSING NOTE
Subjective: 33 year old patient presents for pregnancy confirmation. Her last menstrual period was 7/20/19. She has had a + UPT This is her 1st pregnancy, G1, P0. She has had previous n/a. She would like to be seen Dr. Turner for her prenatal care. She has started prenatal vitamins.      Exam:  General Appearance: appears well.  Her urine pregnancy test is positive 8/17/19.    Assessment: positive pregnancy confirmation.    Plan: Patient has an EDC of 4/25/20. Is currently 5w4d. She is scheduled for her first OB appointment with Dr. Turner 10/16/19. Risk assessment done. We discussed basic pregnancy care, diet, exercise and prenatal vitamins.     Pre Term Labor Risk Assessment   1. Is the patient's age <18 or >40? no  2. Does the patient have a BMI < 18.5? no  3. If previous pregnancy, was delivery within previous 6 months? no  4. Have you ever been diagnosed with pyelonephritis? no  5. Have you ever delivered a baby prior to 37 weeks gestation? no  6. Have you ever been told you have a uterine anomaly? no  7. Do you currently have uterine fibroids? no  8. Have you had any gynecological surgical procedures such as cervical conization, a LEEP procedure, laser treatment or cryosurgery of the cervix? no  9. Did your mother take HUGO or any other hormones when she was pregnant with you? no  10. Did conception for this pregnancy occur via In Vitro Fertilization? no  11. Are you carrying twins? no  12. Do you currently use tobacco products? no  13. Prior to this pregnancy, how much alcohol did you drink each week? (if >7/week= high risk..)  2/mo  14. Since you learned you were pregnant, how much beer, wine or hard liquor did you drink per week? (anything >0 = high risk) none  15. Prior to learning you are pregnant, did you use any of the following: marijuana, prescription narcotics, speed, cocaine, heroin, hallucinogens or other drugs? (any use within 1 yr = high risk)  no  16. Since you learned you are pregnant, have you  used any of the following: marijuana, prescription narcotics, speed, cocaine, heroin, hallucinogens or other drugs? (any use = high risk)  no  17. Do you have a history of chemical dependency (yes=high risk)  no  18. Have you ever been treated for more than 1 Urinary Tract Infection during this pregnancy? no  19. Do you have a history of Depression, Bi-polar disorder, anxiety, schizophrenia or other mental illness?  no  20. Are you currently being treated for depression, bi-polar disorder, anxiety, schizophrenia or other mental illness? no  21. Have you had Chlamydia or gonorrhea during this pregnancy? no  22. Periodontal disease (gum disease)? no  23. Has anyone hit, slapped, kicked or otherwise hurt you? no  24. Has anyone forced you to have sex when you didn't want to? no  Summary:   Patient is not high risk for  Labor   Tamera Webb RN

## 2019-08-29 LAB
BACTERIA SPEC CULT: NORMAL
C TRACH DNA SPEC QL NAA+PROBE: NEGATIVE
HBV SURFACE AG SERPL QL IA: NONREACTIVE
HIV 1+2 AB+HIV1 P24 AG SERPL QL IA: NONREACTIVE
N GONORRHOEA DNA SPEC QL NAA+PROBE: NEGATIVE
RUBV IGG SERPL IA-ACNC: 248 IU/ML
SPECIMEN SOURCE: NORMAL
T PALLIDUM AB SER QL: NONREACTIVE

## 2019-09-04 ENCOUNTER — HOSPITAL ENCOUNTER (OUTPATIENT)
Dept: ULTRASOUND IMAGING | Facility: CLINIC | Age: 34
Discharge: HOME | End: 2019-09-04
Attending: FAMILY MEDICINE | Admitting: FAMILY MEDICINE
Payer: COMMERCIAL

## 2019-09-04 DIAGNOSIS — Z32.01 PREGNANCY TEST PERFORMED, PREGNANCY CONFIRMED: ICD-10-CM

## 2019-09-04 PROCEDURE — 76801 OB US < 14 WKS SINGLE FETUS: CPT

## 2019-10-03 ENCOUNTER — HEALTH MAINTENANCE LETTER (OUTPATIENT)
Age: 34
End: 2019-10-03

## 2019-10-16 ENCOUNTER — PRENATAL OFFICE VISIT (OUTPATIENT)
Dept: FAMILY MEDICINE | Facility: CLINIC | Age: 34
End: 2019-10-16
Payer: COMMERCIAL

## 2019-10-16 VITALS
TEMPERATURE: 98.1 F | HEART RATE: 83 BPM | SYSTOLIC BLOOD PRESSURE: 128 MMHG | DIASTOLIC BLOOD PRESSURE: 78 MMHG | OXYGEN SATURATION: 100 % | BODY MASS INDEX: 25.82 KG/M2 | WEIGHT: 160 LBS

## 2019-10-16 DIAGNOSIS — Z34.91 PRENATAL CARE IN FIRST TRIMESTER: Primary | ICD-10-CM

## 2019-10-16 PROCEDURE — 99207 ZZC FIRST OB VISIT: CPT | Performed by: FAMILY MEDICINE

## 2019-10-16 NOTE — PROGRESS NOTES
Subjective:  Susan Rubalcava is a 33 year old female  who presents today for her first prenatal visit.  She has never had an abnormal PAP smear.  Her LMP was 7/20/19.  She has had trouble with nausea.  This is her 1st pregnancy.  She is a G 1 P 0.    Her EDC is 4/25/20.  She has the following questions:  none    Current Outpatient Medications   Medication Sig Dispense Refill     Prenatal Vit-Fe Fumarate-FA (PRENATAL MULTIVITAMIN W/IRON) 27-0.8 MG tablet Take 1 tablet by mouth daily 90 tablet 3       Past Medical History:   Diagnosis Date     ASCUS with positive high risk HPV 9/06    + HPV 16 colp - CANDIE I     Cervical high risk HPV (human papillomavirus) test positive 2006, '08,'13,'14,'16,'17-'19     Contact dermatitis and other eczema, due to unspecified cause      LSIL (low grade squamous intraepithelial lesion) on Pap smear 8/07, 10/13, 7/14, 8/14    +high risk HPV, CANDIE 1 on pathology x 3     Mild dysplasia of cervix 6/4/2019     Rheumatism, unspecified and fibrositis 2004    no repeat episodes since 5/2004     Rheumatoid arthritis of multiple sites with negative rheumatoid factor (H) 7/29/2004    Senior year of high school and first year of college and no symptoms since then.       Past Surgical History:   Procedure Laterality Date     NO HISTORY OF SURGERY         Family History   Problem Relation Age of Onset     Thyroid Disease Mother      Prostate Cancer Father      Breast Cancer Maternal Grandmother      Diabetes Maternal Grandfather         in70's     Heart Disease Paternal Grandmother      Rheumatoid Arthritis Paternal Grandmother      Cerebrovascular Disease Paternal Grandfather      Alzheimer Disease Paternal Grandfather      C.A.D. No family hx of      Cancer - colorectal No family hx of        Social History     Tobacco Use     Smoking status: Never Smoker     Smokeless tobacco: Never Used   Substance Use Topics     Alcohol use: Not Currently       Objective:  Blood pressure 128/78, pulse 83,  temperature 98.1  F (36.7  C), temperature source Oral, weight 72.6 kg (160 lb), last menstrual period 07/20/2019, SpO2 100 %.  General appearance: Healthy.  Mental Status: cooperative, normal affect, no gross thought process defects.  HEENT:   Ears- Normal external canals and TMs  Eyes- PERRL, EOM's intact, fundi benign, sharp disc margins.  Throat- Normal  Nodes- Negative  Thyroid: Normal to palpation, no enlargement or nodules noted.  Breasts: Symmetric without mass tenderness or discharge.  Axillary nodes negative.  Lungs: Clear to auscultation bilaterally  Heart.: Normal rate and rhythm.  No murmurs, clicks or gallops.  Pulses:    R-4/4, PT-4/4, DP-4/4  Abdomen: BS active. Soft, non-tender, no masses or organomegaly.  Aorta normal. No bruits.  Genitalia: Normal female external genitalia without lesions.  Speculum:  Cervix normal,  Pap taken, bimanual exam  12 week size uterus, no adenexal mass or tenderness.  Doptone 160's.  Extremities: Normal without edema  Reflexes:  Symmetric bilaterally and 2 + at the patella  Skin: Clear and free of rash.    Assessment:  1. Susan Rubalcava is a healthy 33 year old female here for a first prenatal visit.      Plan:  1. A Pap smear was NOT  obtained  2. [facility] - [facility]  3. fetal anatomy survey to be done around 20 weeks gestation  4. Patient is to follow-up in 4 weeks and as needed.

## 2019-10-16 NOTE — PATIENT INSTRUCTIONS
Thank you for choosing [facility] today for your health care needs.     [facility] transforming primary care  At [facility], we re constantly working to improve how we serve our patients and communities. We re currently making changes to the way we deliver care. Most of the work is behind the scenes, but you ll benefit from our efforts to make it easier and more convenient to stay connected to [facility] and your care team to maintain your optimal health.    Benefits of a primary care provider  If you don t have a designated primary care provider yet, we encourage you to get to know our care team online, and find a provider you d like to see. Most of our providers have a short video on their online provider page. Visit [email or web address] to explore our providers and locations.     Benefits of having a primary care provider include:      A provider who sees you regularly is more likely to notice changes in your health.    They get to know you - your health history, family history and goals, making it easier to make a health plan together.     You get to know them - making health-related conversations and decisions easier      Primary care doctors help you when you re sick or hurt - but also focus on keeping you healthy with preventive care and screenings.     Online access to your health records and care team  [employer]appit is our online tool that makes it easy to see your health care information and communicate with your care team. z[employer]it allows you to:          View your health maintenance plan so you know when you re due for a preventive screening    Send secure messages to your care team    View your health history and visit summaries     Schedule appointments     Complete questionnaires and eCheck-in before appointments      Get care from your provider with an e-visit      View and pay your bill     Sign up at [email or web address]. Once you have an account, you also can download the mobile romulo.     Convenient care options    Online or by phone:     E-visit:  When you need care, but don t need a face-to-face appointment, complete an e-visit in [employer]. Your provider will respond within one business day.    Phone visit: A convenient and cost-effective option for follow-up visits or addressing common conditions. Schedule a phone visit by calling the clinic.     OnCare: Our online clinic is available 24/7 for treatment of dozens of common conditions. Complete an online interview, then a [facility] provider will respond in an hour or less. Start a visit at [email or web address].       In-person     Clinic appointments: Schedule an appointment at a clinic close to you anytime online at [email or web address] or call [facility].     Urgent Care: Visit one of our Urgent Care locations throughout the [facility]. View locations and estimated wait times at [email or web address].

## 2019-10-28 ENCOUNTER — OFFICE VISIT (OUTPATIENT)
Dept: URGENT CARE | Facility: URGENT CARE | Age: 34
End: 2019-10-28
Payer: COMMERCIAL

## 2019-10-28 ENCOUNTER — HOSPITAL ENCOUNTER (EMERGENCY)
Facility: CLINIC | Age: 34
Discharge: HOME | End: 2019-10-28
Attending: EMERGENCY MEDICINE | Admitting: EMERGENCY MEDICINE
Payer: COMMERCIAL

## 2019-10-28 VITALS
SYSTOLIC BLOOD PRESSURE: 131 MMHG | HEART RATE: 111 BPM | OXYGEN SATURATION: 100 % | TEMPERATURE: 98.1 F | HEIGHT: 66 IN | RESPIRATION RATE: 18 BRPM | BODY MASS INDEX: 25.71 KG/M2 | DIASTOLIC BLOOD PRESSURE: 86 MMHG | WEIGHT: 160 LBS

## 2019-10-28 VITALS
TEMPERATURE: 98.9 F | RESPIRATION RATE: 18 BRPM | DIASTOLIC BLOOD PRESSURE: 85 MMHG | HEART RATE: 113 BPM | SYSTOLIC BLOOD PRESSURE: 132 MMHG | OXYGEN SATURATION: 100 % | BODY MASS INDEX: 25.86 KG/M2 | WEIGHT: 160.2 LBS

## 2019-10-28 DIAGNOSIS — D72.829 LEUKOCYTOSIS, UNSPECIFIED TYPE: ICD-10-CM

## 2019-10-28 DIAGNOSIS — R52 GENERALIZED BODY ACHES: Primary | ICD-10-CM

## 2019-10-28 DIAGNOSIS — J11.1 INFLUENZA-LIKE ILLNESS: ICD-10-CM

## 2019-10-28 LAB
ALBUMIN UR-MCNC: NEGATIVE MG/DL
ALBUMIN UR-MCNC: NEGATIVE MG/DL
ANION GAP SERPL CALCULATED.3IONS-SCNC: 7 MMOL/L (ref 3–14)
APPEARANCE UR: CLEAR
APPEARANCE UR: CLEAR
BACTERIA #/AREA URNS HPF: ABNORMAL /HPF
BASOPHILS # BLD AUTO: 0 10E9/L (ref 0–0.2)
BASOPHILS NFR BLD AUTO: 0 %
BILIRUB UR QL STRIP: NEGATIVE
BILIRUB UR QL STRIP: NEGATIVE
BUN SERPL-MCNC: 4 MG/DL (ref 7–30)
CALCIUM SERPL-MCNC: 9.2 MG/DL (ref 8.5–10.1)
CHLORIDE SERPL-SCNC: 103 MMOL/L (ref 94–109)
CO2 SERPL-SCNC: 24 MMOL/L (ref 20–32)
COLOR UR AUTO: ABNORMAL
COLOR UR AUTO: YELLOW
CREAT SERPL-MCNC: 0.49 MG/DL (ref 0.52–1.04)
DEPRECATED S PYO AG THROAT QL EIA: NORMAL
DIFFERENTIAL METHOD BLD: ABNORMAL
EOSINOPHIL # BLD AUTO: 0.1 10E9/L (ref 0–0.7)
EOSINOPHIL NFR BLD AUTO: 0.4 %
ERYTHROCYTE [DISTWIDTH] IN BLOOD BY AUTOMATED COUNT: 12.7 % (ref 10–15)
FLUAV+FLUBV AG SPEC QL: NEGATIVE
FLUAV+FLUBV AG SPEC QL: NEGATIVE
GFR SERPL CREATININE-BSD FRML MDRD: >90 ML/MIN/{1.73_M2}
GLUCOSE SERPL-MCNC: 103 MG/DL (ref 70–99)
GLUCOSE UR STRIP-MCNC: NEGATIVE MG/DL
GLUCOSE UR STRIP-MCNC: NEGATIVE MG/DL
HCT VFR BLD AUTO: 36.4 % (ref 35–47)
HGB BLD-MCNC: 12 G/DL (ref 11.7–15.7)
HGB UR QL STRIP: ABNORMAL
HGB UR QL STRIP: NEGATIVE
KETONES UR STRIP-MCNC: 20 MG/DL
KETONES UR STRIP-MCNC: NEGATIVE MG/DL
LACTATE BLD-SCNC: 1 MMOL/L (ref 0.7–2)
LEUKOCYTE ESTERASE UR QL STRIP: NEGATIVE
LEUKOCYTE ESTERASE UR QL STRIP: NEGATIVE
LYMPHOCYTES # BLD AUTO: 1.4 10E9/L (ref 0.8–5.3)
LYMPHOCYTES NFR BLD AUTO: 6.9 %
MCH RBC QN AUTO: 29.4 PG (ref 26.5–33)
MCHC RBC AUTO-ENTMCNC: 33 G/DL (ref 31.5–36.5)
MCV RBC AUTO: 89 FL (ref 78–100)
MONOCYTES # BLD AUTO: 1 10E9/L (ref 0–1.3)
MONOCYTES NFR BLD AUTO: 5.1 %
MUCOUS THREADS #/AREA URNS LPF: PRESENT /LPF
NEUTROPHILS # BLD AUTO: 17.8 10E9/L (ref 1.6–8.3)
NEUTROPHILS NFR BLD AUTO: 87.6 %
NITRATE UR QL: NEGATIVE
NITRATE UR QL: NEGATIVE
NON-SQ EPI CELLS #/AREA URNS LPF: NORMAL /LPF
PH UR STRIP: 7 PH (ref 5–7)
PH UR STRIP: 7.5 PH (ref 5–7)
PLATELET # BLD AUTO: 324 10E9/L (ref 150–450)
POTASSIUM SERPL-SCNC: 3.4 MMOL/L (ref 3.4–5.3)
RBC # BLD AUTO: 4.08 10E12/L (ref 3.8–5.2)
RBC #/AREA URNS AUTO: 2 /HPF (ref 0–2)
RBC #/AREA URNS AUTO: NORMAL /HPF
SODIUM SERPL-SCNC: 134 MMOL/L (ref 133–144)
SOURCE: ABNORMAL
SOURCE: ABNORMAL
SP GR UR STRIP: 1 (ref 1–1.03)
SP GR UR STRIP: 1.01 (ref 1–1.03)
SPECIMEN SOURCE: NORMAL
SPECIMEN SOURCE: NORMAL
SQUAMOUS #/AREA URNS AUTO: 1 /HPF (ref 0–1)
UROBILINOGEN UR STRIP-ACNC: 0.2 EU/DL (ref 0.2–1)
UROBILINOGEN UR STRIP-MCNC: NORMAL MG/DL (ref 0–2)
WBC # BLD AUTO: 20.3 10E9/L (ref 4–11)
WBC #/AREA URNS AUTO: 1 /HPF (ref 0–5)
WBC #/AREA URNS AUTO: NORMAL /HPF

## 2019-10-28 PROCEDURE — 87804 INFLUENZA ASSAY W/OPTIC: CPT | Performed by: PHYSICIAN ASSISTANT

## 2019-10-28 PROCEDURE — 36415 COLL VENOUS BLD VENIPUNCTURE: CPT | Performed by: EMERGENCY MEDICINE

## 2019-10-28 PROCEDURE — 87086 URINE CULTURE/COLONY COUNT: CPT | Performed by: EMERGENCY MEDICINE

## 2019-10-28 PROCEDURE — 99283 EMERGENCY DEPT VISIT LOW MDM: CPT

## 2019-10-28 PROCEDURE — 81001 URINALYSIS AUTO W/SCOPE: CPT | Performed by: PHYSICIAN ASSISTANT

## 2019-10-28 PROCEDURE — 99214 OFFICE O/P EST MOD 30 MIN: CPT | Performed by: PHYSICIAN ASSISTANT

## 2019-10-28 PROCEDURE — 85025 COMPLETE CBC W/AUTO DIFF WBC: CPT | Performed by: PHYSICIAN ASSISTANT

## 2019-10-28 PROCEDURE — 87081 CULTURE SCREEN ONLY: CPT | Performed by: PHYSICIAN ASSISTANT

## 2019-10-28 PROCEDURE — 83605 ASSAY OF LACTIC ACID: CPT | Performed by: EMERGENCY MEDICINE

## 2019-10-28 PROCEDURE — 87040 BLOOD CULTURE FOR BACTERIA: CPT | Mod: XS | Performed by: EMERGENCY MEDICINE

## 2019-10-28 PROCEDURE — 25000132 ZZH RX MED GY IP 250 OP 250 PS 637: Performed by: EMERGENCY MEDICINE

## 2019-10-28 PROCEDURE — 81001 URINALYSIS AUTO W/SCOPE: CPT | Performed by: EMERGENCY MEDICINE

## 2019-10-28 PROCEDURE — 80048 BASIC METABOLIC PNL TOTAL CA: CPT | Performed by: EMERGENCY MEDICINE

## 2019-10-28 PROCEDURE — 87880 STREP A ASSAY W/OPTIC: CPT | Performed by: PHYSICIAN ASSISTANT

## 2019-10-28 RX ORDER — ACETAMINOPHEN 325 MG/1
650 TABLET ORAL ONCE
Status: COMPLETED | OUTPATIENT
Start: 2019-10-28 | End: 2019-10-28

## 2019-10-28 RX ADMIN — ACETAMINOPHEN 650 MG: 325 TABLET, FILM COATED ORAL at 19:43

## 2019-10-28 ASSESSMENT — ENCOUNTER SYMPTOMS
COUGH: 0
BACK PAIN: 0
ABDOMINAL PAIN: 0
SORE THROAT: 0
CHILLS: 1
FEVER: 0
HEADACHES: 1
MYALGIAS: 1

## 2019-10-28 ASSESSMENT — MIFFLIN-ST. JEOR: SCORE: 1447.51

## 2019-10-28 NOTE — ED AVS SNAPSHOT
[facility] Emergency Department  [address]  [facility] [address]  Phone:  [phone]  Fax:  [phone]                                    Susan Rubalcava   MRN: [id number]    Department:  [facility] Emergency Department   Date of Visit:  10/28/2019           After Visit Summary Signature Page    I have received my discharge instructions, and my questions have been answered. I have discussed any challenges I see with this plan with the nurse or doctor.    ..........................................................................................................................................  Patient/Patient Representative Signature      ..........................................................................................................................................  Patient Representative Print Name and Relationship to Patient    ..................................................               ................................................  Date                                   Time    ..........................................................................................................................................  Reviewed by Signature/Title    ...................................................              ..............................................  Date                                               Time          22EPIC Rev 08/18

## 2019-10-28 NOTE — PROGRESS NOTES
CHIEF COMPLAINT:   Chief Complaint   Patient presents with     Urgent Care     URI     start today sx chills, body aches, headaches, litsofq35 weeks pregnant tx none        HPI: Susan Rubalcava is a 33 year old, 14 week pregnant female, with a history of URI symptoms, who presents to clinic today for evaluation of body aches, headache and fatigue.  Patient reports that earlier today she developed chills, body aches and headache.  It all came on very quickly prompting her visit to urgent care. In clinic, she does not localize pain or discomfort. She denies having fever, chest pain, sob, hemoptysis, abd pain, vomiting or diarrhea.     Past Medical History:   Diagnosis Date     ASCUS with positive high risk HPV 9/06    + HPV 16 colp - CANDIE I     Cervical high risk HPV (human papillomavirus) test positive 2006, '08,'13,'14,'16,'17-'19     Contact dermatitis and other eczema, due to unspecified cause      LSIL (low grade squamous intraepithelial lesion) on Pap smear 8/07, 10/13, 7/14, 8/14    +high risk HPV, CANDIE 1 on pathology x 3     Mild dysplasia of cervix 6/4/2019     Rheumatism, unspecified and fibrositis 2004    no repeat episodes since 5/2004     Rheumatoid arthritis of multiple sites with negative rheumatoid factor (H) 7/29/2004    Senior year of high school and first year of college and no symptoms since then.     Past Surgical History:   Procedure Laterality Date     NO HISTORY OF SURGERY       Social History     Tobacco Use     Smoking status: Never Smoker     Smokeless tobacco: Never Used   Substance Use Topics     Alcohol use: Not Currently     Current Outpatient Medications   Medication     Prenatal Vit-Fe Fumarate-FA (PRENATAL MULTIVITAMIN W/IRON) 27-0.8 MG tablet     No current facility-administered medications for this visit.      Allergies   Allergen Reactions     Amoxicillin        10 point ROS of systems including Constitutional, Eyes, Respiratory, Cardiovascular, Gastroenterology, Genitourinary,  Integumentary, Muscularskeletal, Psychiatric were all negative except for pertinent positives noted in my HPI.        Exam:  /68 (BP Location: Right arm, Patient Position: Chair, Cuff Size: Adult Regular)   Pulse 115   Temp 98.9  F (37.2  C) (Oral)   Resp 20   Wt 72.7 kg (160 lb 3.2 oz)   LMP 07/20/2019   SpO2 100%   BMI 25.86 kg/m    Constitutional: healthy, alert and no distress  Head: Normocephalic, atraumatic.  Eyes: conjunctiva clear, no drainage  ENT: TMs clear and shiny terry, nasal mucosa pink and moist, throat without tonsillar hypertrophy or erythema  Neck: neck is supple, no cervical lymphadenopathy or nuchal rigidity  Cardiovascular: RRR  Respiratory: CTA bilaterally, no rhonchi or rales  Gastrointestinal: soft and nontender  Skin: no rashes  Neurologic: Speech clear, gait normal. Moves all extremities.      ASSESSMENT/PLAN:  1. Generalized body aches    - Influenza A/B antigen  - Strep, Rapid Screen  - UA reflex to Microscopic and Culture  - CBC with platelets  - Urine Microscopic  - Beta strep group A culture  - WBC Differential    2. Leukocytosis, unspecified type    Unclear cause of body aches, fatigue. Cursory labs or infection were obtained and revealed leukocytosis. Sending to the ER for further eval.     Rossy Carrington PA-C

## 2019-10-29 ENCOUNTER — TELEPHONE (OUTPATIENT)
Dept: FAMILY MEDICINE | Facility: CLINIC | Age: 34
End: 2019-10-29

## 2019-10-29 LAB
BACTERIA SPEC CULT: NO GROWTH
Lab: NORMAL
SPECIMEN SOURCE: NORMAL

## 2019-10-29 NOTE — DISCHARGE INSTRUCTIONS
Return for vaginal bleeding, abdominal pain, cough, neck stiffness, new concerns. Follow up in clinic for recheck in a few days.     Discharge Instructions  Influenza    You were diagnosed today with influenza or influenza like illness.  Influenza is a respiratory (breathing) illness caused by influenza A or B viruses.  Influenza causes five primary symptoms: fever, headache, muscle aches/fatigue/malaise, sore throat and cough.  These symptoms start one to four days after you have been around a person with this illness. Influenza is spread through sneezing and coughing and can live on surfaces for several days.  It is usually contagious for 5 days but in some cases up to 10 days and often affects several family members. If you have a family member who is less than 2 years old, older than 65 years old, pregnant or has a serious medical condition, they should be seen right away by a provider to decide if they should take preventative medications. Although influenza will make you feel very ill, most patients don t require any specific treatment. An antiviral medication might be prescribed for certain groups of patients (older patients, younger patients, and those with certain chronic medical problems).    Generally, every Emergency Department visit should have a follow-up clinic visit with either a primary or a specialty clinic/provider. Please follow-up as instructed by your emergency provider today.    Return to the Emergency Department if:  You have trouble breathing.  You develop pain in your chest.  You have signs of being dehydrated, such as dizziness or unable to urinate (pee) at least three times daily.  You are confused or severely weak.  You cannot stop vomiting (throwing up) or you cannot drink enough fluids.    In children, you should seek help if the child has any of the above or if child:  Has blue or purplish skin color.  Is so irritable that he or she does not want to be held.  Does not have tears when  crying (in infants) or does not urinate at least three times daily.  Does not wake up easily.    What can I do to help myself?  Rest.  Fluids -- Drink hydrating solutions such as Gatorade  or Pedialyte  as often as you can. If you are drinking enough, you should pass urine at least every eight hours.  Tylenol  (acetaminophen) and Advil  (ibuprofen) can relieve fever, headache, and muscle aches. Do not give aspirin to children under 18 years old.   Antiviral treatment -- Antiviral medicines do not make the flu symptoms go away immediately.  They have only been shown to make the symptoms go away 12 to 24 hours sooner than they would without treatment.     Antibiotics -- Antibiotics are NOT useful for treating viral illnesses such as influenza. Antibiotics should only be used if there is a bacterial complication of the flu such as bacterial pneumonia, ear infection, or sinusitis.  Because you were diagnosed with a flu like illness you are very contagious.  This means you cannot work, attend school or  for at least 24 hours or until you no longer have a fever.  If you were given a prescription for medicine here today, be sure to read all of the information (including the package insert) that comes with your prescription.  This will include important information about the medicine, its side effects, and any warnings that you need to know about.  The pharmacist who fills the prescription can provide more information and answer questions you may have about the medicine.  If you have questions or concerns that the pharmacist cannot address, please call or return to the Emergency Department.   Remember that you can always come back to the Emergency Department if you are not able to see your regular provider in the amount of time listed above, if you get any new symptoms, or if there is anything that worries you.

## 2019-10-29 NOTE — TELEPHONE ENCOUNTER
Patient sent in a [facility] request for an appointment with Dr Hamlin.    Dr Madison Hamlin is scheduling into January.    Re-check recommended by ER for flu symptoms     Please call patient.

## 2019-10-29 NOTE — TELEPHONE ENCOUNTER
Dr. Turner- do you want to work her in.  14 weeks pregnant.  Seen at  and ER 10/28/19.  Flu like symptoms and WBC- 20.3.  Regular OB visit 11/11/19 3:30 pm.   Please advise.  Tamera Webb RN     10/28/2019 (1 hours)  [facility] Emergency Department  Impression & Plan    Medical Decision Making:  Patient presents to the ER for evaluation of chills, body aches, headache over the past 12 hours.  Vital signs on arrival notable for temperature of 111.  There is no fever.  Patient denies any antipyretic use to suggest occult fever.  I reviewed the work-up from clinic and she did have a leukocytosis of 20.3.  Urinalysis was not suggestive of infection.  Strep testing was negative and influenza testing was negative as well.  Lactic acid here is normal.  Blood cultures were sent.  No evidence of sepsis at this time.  Repeat urine was collected to send for culture.  No abdominal tenderness or peritoneal signs to suggest occult infectious intra-abdominal process.  No respiratory symptoms to suggest occult pneumonia.  No exam or history findings to suggest occult meningitis, RPA, PTA, epiglottitis.  I cautioned that she seems to be quite early in this illness and new sx could develop prompting her to come back to ER.  It could still be influenza despite negative influenza testing.  Close return precautions were discussed prior to discharge.  Plan for recheck with PCP in the coming days.     Diagnosis:      ICD-10-CM     1. Influenza-like illness R69     2. Leukocytosis, unspecified type D72.829           Disposition:  Discharged to home     Scribe Disclosure:  I, Saulo Adrian, am serving as a scribe on 10/28/2019 at 7:32 PM to personally document services performed by Cesar Terry MD based on my observations and the provider's statements to me.      Saulo Adrian  10/28/2019   [facility] EMERGENCY DEPARTMENT        Cesar Montez MD  10/28/19 [id number]

## 2019-10-29 NOTE — ED TRIAGE NOTES
Body aches, fatigue, chills, and headache started today, went to to MD today, had a flu, strept, UA, and blood test. Flu, UA, and strep negative, but WBCs was 20 and patient was advised to come to ED. Currently 14 weeks pregnant. ABCs intact.

## 2019-10-29 NOTE — ED PROVIDER NOTES
History     Chief Complaint:  Abnormal Labs      HPI   Susan Rubalcava is a 33 year old  female currently 14 weeks pregnant who presents to the emergency department with her  for evaluation of abnormal labs. Patient reports that earlier today she developed chills, body aches, and headache. She was seen at Urgent Care shortly after onset and had labs done, including negative strep and flu. However, she was found to have leukocytosis at 20.3 and was therefore sent here. Patient denies any issues with this pregnancy, vaginal bleeding, abdominal pain, fever, cough, sore throat, neck/back pain, or rash. Her OB/GYN is Dr. Turner [facility].    10/28/2019  Laboratory:  CBC: WBC: 20.3 (H), HGB: 12.0, PLT: 324  UA: Clear yellow urine, blood: trace, pH: 7.5 (H), otherwise WNL  Beta strep: Negative, culture pending  Influenza A/B antigen: Negative    Allergies:  Amoxicillin      Medications:    The patient is not currently taking any prescribed medications.     Past Medical History:    Contact dermatitis   Rheumatoid arthritis   LSIL     Past Surgical History:    History reviewed. No pertinent past surgical history.    Family History:    Thyroid disease  Prostate cancer  Breast cancer  Diabetes  Heart disease: paternal grandmother   RA  Cerebrovascular disease  Alzheimer disease     Social History:  Tobacco Use: Never  Alcohol Use: No  Occupation:   PCP: Madison Turner  Marital Status:        Review of Systems   Constitutional: Positive for chills. Negative for fever.   HENT: Negative for sore throat.    Respiratory: Negative for cough.    Gastrointestinal: Negative for abdominal pain.   Genitourinary: Negative for vaginal bleeding.   Musculoskeletal: Positive for myalgias. Negative for back pain.   Skin: Negative for rash.   Neurological: Positive for headaches.   All other systems reviewed and are negative.    Physical Exam     Patient Vitals for the past 24 hrs:   BP Temp Temp  "src Pulse Heart Rate Resp SpO2 Height Weight   10/28/19 1919 131/86 98.1  F (36.7  C) Oral 111 111 18 100 % 1.676 m (5' 6\") 72.6 kg (160 lb)     Physical Exam    VS: Reviewed per above  HENT: Mucous membranes moist, no nuchal rigidity.  Uvula midline, no tonsillar hypertrophy nor asymmetry. Tolerating secretions, normal phonation. No nuchal rigidity.  EYES: sclera anicteric  CV: Rate as noted, regular rhythm.   RESP: Effort normal. Breath sounds are normal bilaterally.  GI: no tenderness/rebound/guarding, not distended.  NEURO: Alert, moving all extremities  MSK: No deformity of the extremities  SKIN: Warm and dry    Emergency Department Course   Laboratory:  BMP: Glucose 103 (H), Urea nitrogen: 4 (L), Creatinine: 0.49 (L), o/w WNL     UA: Clear straw urine, ketones: 20, bacteria: few, mucous: present, otherwise WNL  Urine culture: In process    1956 Blood culture: In process  1954 Blood culture: In process    2007 Lactic acid whole blood: 1.0    Interventions:  1943 Tylenol 650 mg tablet PO    Emergency Department Course:  1932 Nursing notes and vitals reviewed. I performed an exam of the patient as documented above.     Medicine administered as documented above. Blood drawn. This was sent to the lab for further testing, results above.    The patient provided a urine sample here in the emergency department. This was sent for laboratory testing, findings above.     2028 I rechecked the patient and discussed the results of her workup thus far.     Findings and plan explained to the Patient. Patient discharged home with instructions regarding supportive care, medications, and reasons to return. The importance of close follow-up was reviewed.     I personally reviewed the laboratory results with the Patient and answered all related questions prior to discharge.     Impression & Plan    Medical Decision Making:  Patient presents to the ER for evaluation of chills, body aches, headache over the past 12 hours.  Vital " signs on arrival notable for temperature of 111.  There is no fever.  Patient denies any antipyretic use to suggest occult fever.  I reviewed the work-up from clinic and she did have a leukocytosis of 20.3.  Urinalysis was not suggestive of infection.  Strep testing was negative and influenza testing was negative as well.  Lactic acid here is normal.  Blood cultures were sent.  No evidence of sepsis at this time.  Repeat urine was collected to send for culture.  No abdominal tenderness or peritoneal signs to suggest occult infectious intra-abdominal process.  No respiratory symptoms to suggest occult pneumonia.  No exam or history findings to suggest occult meningitis, RPA, PTA, epiglottitis.  I cautioned that she seems to be quite early in this illness and new sx could develop prompting her to come back to ER.  It could still be influenza despite negative influenza testing.  Close return precautions were discussed prior to discharge.  Plan for recheck with PCP in the coming days.    Diagnosis:    ICD-10-CM    1. Influenza-like illness R69    2. Leukocytosis, unspecified type D72.829        Disposition:  Discharged to home    Scribe Disclosure:  I, Saulo Adrian, am serving as a scribe on 10/28/2019 at 7:32 PM to personally document services performed by Cesar Terry MD based on my observations and the provider's statements to me.     Saulo Adrian  10/28/2019   [facility] EMERGENCY DEPARTMENT       Cesar Montez MD  10/28/19 [id number]

## 2019-10-30 LAB
BACTERIA SPEC CULT: NORMAL
SPECIMEN SOURCE: NORMAL

## 2019-10-30 NOTE — RESULT ENCOUNTER NOTE
Final urine culture report is NEGATIVE per [facility] Lab Result protocol.    If NEGATIVE result, no change in treatment, per [facility] Lab Result protocol.

## 2019-10-30 NOTE — TELEPHONE ENCOUNTER
No she does not - it is not unusual for women who are pregnant to have their white counts run a little higher than normal.   When is her next visit?

## 2019-10-30 NOTE — TELEPHONE ENCOUNTER
Dr. Meyer,  Pt says she is feeling better.  ER told her to be seen by PCP for f/u due to high WBC per pt within 2 days.  She is wanting to know if she still needs to come in as she is feeling  Better an if so, she can only come in after 3:30 due to work and saving up PTO for maternity leave.  Please advise.  Thanks,  Celeste Hayden RN    Triage- please leave detailed message on cell

## 2019-11-03 LAB
BACTERIA SPEC CULT: NO GROWTH
BACTERIA SPEC CULT: NO GROWTH
Lab: NORMAL
Lab: NORMAL
SPECIMEN SOURCE: NORMAL
SPECIMEN SOURCE: NORMAL

## 2019-11-11 ENCOUNTER — PRENATAL OFFICE VISIT (OUTPATIENT)
Dept: FAMILY MEDICINE | Facility: CLINIC | Age: 34
End: 2019-11-11
Payer: COMMERCIAL

## 2019-11-11 VITALS
DIASTOLIC BLOOD PRESSURE: 79 MMHG | BODY MASS INDEX: 26.2 KG/M2 | SYSTOLIC BLOOD PRESSURE: 134 MMHG | OXYGEN SATURATION: 100 % | TEMPERATURE: 98.3 F | WEIGHT: 163 LBS | HEIGHT: 66 IN | HEART RATE: 81 BPM | RESPIRATION RATE: 20 BRPM

## 2019-11-11 DIAGNOSIS — Z34.92 PRENATAL CARE IN SECOND TRIMESTER: Primary | ICD-10-CM

## 2019-11-11 DIAGNOSIS — D72.829 LEUKOCYTOSIS, UNSPECIFIED TYPE: ICD-10-CM

## 2019-11-11 DIAGNOSIS — Z23 NEED FOR PROPHYLACTIC VACCINATION AND INOCULATION AGAINST INFLUENZA: ICD-10-CM

## 2019-11-11 LAB
ERYTHROCYTE [DISTWIDTH] IN BLOOD BY AUTOMATED COUNT: 13.2 % (ref 10–15)
HCT VFR BLD AUTO: 34.6 % (ref 35–47)
HGB BLD-MCNC: 11.3 G/DL (ref 11.7–15.7)
MCH RBC QN AUTO: 29 PG (ref 26.5–33)
MCHC RBC AUTO-ENTMCNC: 32.7 G/DL (ref 31.5–36.5)
MCV RBC AUTO: 89 FL (ref 78–100)
PLATELET # BLD AUTO: 331 10E9/L (ref 150–450)
RBC # BLD AUTO: 3.89 10E12/L (ref 3.8–5.2)
WBC # BLD AUTO: 15 10E9/L (ref 4–11)

## 2019-11-11 PROCEDURE — 90471 IMMUNIZATION ADMIN: CPT | Performed by: FAMILY MEDICINE

## 2019-11-11 PROCEDURE — 99000 SPECIMEN HANDLING OFFICE-LAB: CPT | Performed by: FAMILY MEDICINE

## 2019-11-11 PROCEDURE — 85027 COMPLETE CBC AUTOMATED: CPT | Performed by: FAMILY MEDICINE

## 2019-11-11 PROCEDURE — 36415 COLL VENOUS BLD VENIPUNCTURE: CPT | Performed by: FAMILY MEDICINE

## 2019-11-11 PROCEDURE — 81511 FTL CGEN ABNOR FOUR ANAL: CPT | Mod: 90 | Performed by: FAMILY MEDICINE

## 2019-11-11 PROCEDURE — 99207 ZZC PRENATAL VISIT: CPT | Performed by: FAMILY MEDICINE

## 2019-11-11 PROCEDURE — 90686 IIV4 VACC NO PRSV 0.5 ML IM: CPT | Performed by: FAMILY MEDICINE

## 2019-11-11 ASSESSMENT — MIFFLIN-ST. JEOR: SCORE: 1461.11

## 2019-11-11 NOTE — PATIENT INSTRUCTIONS
Thank you for choosing [facility] today for your health care needs.     [facility] is transforming primary care  At [facility], we re constantly working to improve how we serve our patients and communities. We re currently making changes to the way we deliver care. Most of the work is behind the scenes, but you ll benefit from our efforts to make it easier and more convenient to stay connected to [facility] and your care team to maintain your optimal health.    Benefits of a primary care provider  If you don t have a designated primary care provider yet, we encourage you to get to know our care team online, and find a provider you d like to see. Most of our providers have a short video on their online provider page. Visit [email or web address] to explore our providers and locations.     Benefits of having a primary care provider include:      A provider who sees you regularly is more likely to notice changes in your health.    They get to know you - your health history, family history and goals, making it easier to make a health plan together.     You get to know them - making health-related conversations and decisions easier      Primary care doctors help you when you re sick or hurt - but also focus on keeping you healthy with preventive care and screenings.     Online access to your health records and care team  SparkupReader is our online tool that makes it easy to see your health care information and communicate with your care team. SparkupReader allows you to:          View your health maintenance plan so you know when you re due for a preventive screening    Send secure messages to your care team    View your health history and visit summaries     Schedule appointments     Complete questionnaires and eCheck-in before appointments      Get care from your provider with an e-visit      View and pay your bill     Sign up at [email or web address]. Once you have an account, you also can download the mobile romulo.     Convenient care options    Online or by phone:     E-visit:  When you need care, but don t need a face-to-face appointment, complete an e-visit in [facility] Messenger. Your provider will respond within one business day.    Phone visit: A convenient and cost-effective option for follow-up visits or addressing common conditions. Schedule a phone visit by calling the clinic.     OnCare: Our online clinic is available 24/7 for treatment of dozens of common conditions. Complete an online interview, then a [facility] provider will respond in an hour or less. Start a visit at [email or web address].       In-person     Clinic appointments: Schedule an appointment at a clinic close to you anytime online at [email or web address] or call [email or web address].     Urgent Care: Visit one of our Urgent Care locations throughout the [facility]. View locations and estimated wait times at [email or web address].

## 2019-11-13 LAB
# FETUSES US: NORMAL
# FETUSES: NORMAL
AFP ADJ MOM AMN: 1.15
AFP SERPL-MCNC: 37 NG/ML
AGE - REPORTED: 34.4 YR
CURRENT SMOKER: NO
CURRENT SMOKER: NO
DIABETES STATUS PATIENT: NO
FAMILY MEMBER DISEASES HX: NO
FAMILY MEMBER DISEASES HX: NO
GA METHOD: NORMAL
GA METHOD: NORMAL
GA: NORMAL WK
HCG MOM SERPL: 0.92
HCG SERPL-ACNC: NORMAL IU/L
HX OF HEREDITARY DISORDERS: NO
IDDM PATIENT QL: NO
INHIBIN A MOM SERPL: 1.23
INHIBIN A SERPL-MCNC: 198 PG/ML
INTEGRATED SCN PATIENT-IMP: NORMAL
IVF PREGNANCY: NO
LMP START DATE: NORMAL
MONOCHORIONIC TWINS: NO
PATHOLOGY STUDY: NORMAL
PREV FETUS DEFECT: NO
SERVICE CMNT-IMP: NO
SPECIMEN DRAWN SERPL: NORMAL
U ESTRIOL MOM SERPL: 0.93
U ESTRIOL SERPL-MCNC: 0.96 NG/ML
VALPROIC/CARBAMAZEPINE STATUS: NO
WEIGHT UNITS: NORMAL

## 2019-12-09 ENCOUNTER — ANCILLARY PROCEDURE (OUTPATIENT)
Dept: ULTRASOUND IMAGING | Facility: CLINIC | Age: 34
End: 2019-12-09
Attending: FAMILY MEDICINE
Payer: COMMERCIAL

## 2019-12-09 DIAGNOSIS — Z34.91 PRENATAL CARE IN FIRST TRIMESTER: ICD-10-CM

## 2019-12-09 PROCEDURE — 76805 OB US >/= 14 WKS SNGL FETUS: CPT | Performed by: OBSTETRICS & GYNECOLOGY

## 2019-12-10 ENCOUNTER — PRENATAL OFFICE VISIT (OUTPATIENT)
Dept: FAMILY MEDICINE | Facility: CLINIC | Age: 34
End: 2019-12-10
Payer: COMMERCIAL

## 2019-12-10 VITALS
HEIGHT: 66 IN | TEMPERATURE: 98.2 F | WEIGHT: 165 LBS | OXYGEN SATURATION: 95 % | BODY MASS INDEX: 26.52 KG/M2 | DIASTOLIC BLOOD PRESSURE: 72 MMHG | HEART RATE: 67 BPM | SYSTOLIC BLOOD PRESSURE: 118 MMHG | RESPIRATION RATE: 16 BRPM

## 2019-12-10 DIAGNOSIS — G93.0 CHOROID PLEXUS CYST: Primary | ICD-10-CM

## 2019-12-10 PROCEDURE — 99207 ZZC PRENATAL VISIT: CPT | Performed by: FAMILY MEDICINE

## 2019-12-10 ASSESSMENT — MIFFLIN-ST. JEOR: SCORE: 1465.19

## 2019-12-10 NOTE — PROGRESS NOTES
Ultrasound could not see profile well  Had choroid plexus cyst and marginal cord insertion - will get CUS at [facility]  Quad screen was NEG  BOY baby

## 2019-12-11 ENCOUNTER — PRE VISIT (OUTPATIENT)
Dept: MATERNAL FETAL MEDICINE | Facility: CLINIC | Age: 34
End: 2019-12-11

## 2019-12-11 ENCOUNTER — TRANSCRIBE ORDERS (OUTPATIENT)
Dept: MATERNAL FETAL MEDICINE | Facility: CLINIC | Age: 34
End: 2019-12-11

## 2019-12-11 DIAGNOSIS — O26.90 PREGNANCY RELATED CONDITION, ANTEPARTUM: Primary | ICD-10-CM

## 2019-12-13 ENCOUNTER — OFFICE VISIT (OUTPATIENT)
Dept: MATERNAL FETAL MEDICINE | Facility: CLINIC | Age: 34
End: 2019-12-13
Attending: FAMILY MEDICINE
Payer: COMMERCIAL

## 2019-12-13 ENCOUNTER — HOSPITAL ENCOUNTER (OUTPATIENT)
Dept: ULTRASOUND IMAGING | Facility: CLINIC | Age: 34
Discharge: HOME | End: 2019-12-13
Attending: FAMILY MEDICINE | Admitting: FAMILY MEDICINE
Payer: COMMERCIAL

## 2019-12-13 DIAGNOSIS — O26.90 PREGNANCY RELATED CONDITION, ANTEPARTUM: ICD-10-CM

## 2019-12-13 DIAGNOSIS — O35.03X0 CHOROID PLEXUS CYST OF FETUS AFFECTING CARE OF MOTHER, ANTEPARTUM, SINGLE OR UNSPECIFIED FETUS: Primary | ICD-10-CM

## 2019-12-13 PROCEDURE — 76811 OB US DETAILED SNGL FETUS: CPT

## 2019-12-13 NOTE — PROGRESS NOTES
Please refer to ultrasound report under 'Imaging' Studies of 'Chart Review' tabs.    Ronald Logan M.D.

## 2020-01-08 ENCOUNTER — PRENATAL OFFICE VISIT (OUTPATIENT)
Dept: FAMILY MEDICINE | Facility: CLINIC | Age: 35
End: 2020-01-08
Payer: COMMERCIAL

## 2020-01-08 VITALS
DIASTOLIC BLOOD PRESSURE: 60 MMHG | OXYGEN SATURATION: 100 % | HEART RATE: 78 BPM | BODY MASS INDEX: 27.12 KG/M2 | TEMPERATURE: 97.7 F | SYSTOLIC BLOOD PRESSURE: 113 MMHG | WEIGHT: 168 LBS | RESPIRATION RATE: 14 BRPM

## 2020-01-08 DIAGNOSIS — Z34.92 PRENATAL CARE IN SECOND TRIMESTER: Primary | ICD-10-CM

## 2020-01-08 DIAGNOSIS — O99.019 ANEMIA DURING PREGNANCY: ICD-10-CM

## 2020-01-08 DIAGNOSIS — G93.0 CHOROID PLEXUS CYST: ICD-10-CM

## 2020-01-08 PROCEDURE — 99207 ZZC PRENATAL VISIT: CPT | Performed by: FAMILY MEDICINE

## 2020-01-08 NOTE — PROGRESS NOTES
Answers for HPI/ROS submitted by the patient on 1/1/2020   Chronic problems general questions HPI Form  How many servings of fruits and vegetables do you eat daily?: 2-3  On average, how many sweetened beverages do you drink each day (Examples: soda, juice, sweet tea, etc.  Do NOT count diet or artificially sweetened beverages)?: 1  How many days per week do you miss taking your medication?: 0

## 2020-01-08 NOTE — PROGRESS NOTES
Marginal cord insertion has resolved  Fetal face was seen at [facility]  And [facility] ultrasound was good - no follow up  Cervix check at next visit  OBHGB and RPR and GCT ordered for next week

## 2020-01-13 DIAGNOSIS — Z34.92 PRENATAL CARE IN SECOND TRIMESTER: ICD-10-CM

## 2020-01-13 PROBLEM — O99.019 ANEMIA DURING PREGNANCY: Status: ACTIVE | Noted: 2020-01-13

## 2020-01-13 LAB — HGB BLD-MCNC: 10.6 G/DL (ref 11.7–15.7)

## 2020-01-13 PROCEDURE — 00000218 ZZHCL STATISTIC OBHBG - HEMOGLOBIN: Performed by: FAMILY MEDICINE

## 2020-01-13 PROCEDURE — 86780 TREPONEMA PALLIDUM: CPT | Performed by: FAMILY MEDICINE

## 2020-01-13 PROCEDURE — 36415 COLL VENOUS BLD VENIPUNCTURE: CPT | Performed by: FAMILY MEDICINE

## 2020-01-13 PROCEDURE — 82950 GLUCOSE TEST: CPT | Performed by: FAMILY MEDICINE

## 2020-01-13 RX ORDER — FERROUS SULFATE 325(65) MG
325 TABLET ORAL 2 TIMES DAILY
Qty: 60 TABLET | Refills: 4 | Status: SHIPPED | OUTPATIENT
Start: 2020-01-13 | End: 2020-06-12

## 2020-01-14 LAB — GLUCOSE 1H P 50 G GLC PO SERPL-MCNC: 173 MG/DL (ref 60–129)

## 2020-01-15 ENCOUNTER — TELEPHONE (OUTPATIENT)
Dept: FAMILY MEDICINE | Facility: CLINIC | Age: 35
End: 2020-01-15

## 2020-01-15 DIAGNOSIS — Z13.1 SCREENING FOR DIABETES MELLITUS: Primary | ICD-10-CM

## 2020-01-15 DIAGNOSIS — R73.09 ABNORMAL GLUCOSE TOLERANCE TEST: ICD-10-CM

## 2020-01-15 LAB — T PALLIDUM AB SER QL: NONREACTIVE

## 2020-01-15 NOTE — TELEPHONE ENCOUNTER
Patient calling back.  Advised of below.  Scheduled lab only tomorrow at 8:30 am.  Tamera Webb RN

## 2020-01-15 NOTE — TELEPHONE ENCOUNTER
L/M to call.  Tamera Webb RN    Notes recorded by Madison Turner MD on 1/15/2020 at 8:47 AM CST  Failed one hour - will need to be screened with 3 hour GTT - please order and help her schedule and I will cosign

## 2020-01-16 DIAGNOSIS — Z13.1 SCREENING FOR DIABETES MELLITUS: ICD-10-CM

## 2020-01-16 DIAGNOSIS — R73.09 ABNORMAL GLUCOSE TOLERANCE TEST: ICD-10-CM

## 2020-01-16 PROCEDURE — 82951 GLUCOSE TOLERANCE TEST (GTT): CPT | Performed by: FAMILY MEDICINE

## 2020-01-16 PROCEDURE — 36415 COLL VENOUS BLD VENIPUNCTURE: CPT | Performed by: FAMILY MEDICINE

## 2020-01-16 PROCEDURE — 82952 GTT-ADDED SAMPLES: CPT | Performed by: FAMILY MEDICINE

## 2020-01-17 LAB
GLUCOSE 1H P 100 G GLC PO SERPL-MCNC: 162 MG/DL (ref 60–179)
GLUCOSE 2H P 100 G GLC PO SERPL-MCNC: 131 MG/DL (ref 60–154)
GLUCOSE 3H P 100 G GLC PO SERPL-MCNC: 106 MG/DL (ref 60–139)
GLUCOSE P FAST SERPL-MCNC: 94 MG/DL (ref 60–94)

## 2020-02-03 ENCOUNTER — PRENATAL OFFICE VISIT (OUTPATIENT)
Dept: FAMILY MEDICINE | Facility: CLINIC | Age: 35
End: 2020-02-03
Payer: COMMERCIAL

## 2020-02-03 VITALS
OXYGEN SATURATION: 99 % | TEMPERATURE: 97.2 F | DIASTOLIC BLOOD PRESSURE: 68 MMHG | HEART RATE: 98 BPM | BODY MASS INDEX: 27.76 KG/M2 | WEIGHT: 172 LBS | SYSTOLIC BLOOD PRESSURE: 124 MMHG

## 2020-02-03 DIAGNOSIS — O99.019 ANEMIA DURING PREGNANCY: ICD-10-CM

## 2020-02-03 DIAGNOSIS — Z34.93 PRENATAL CARE IN THIRD TRIMESTER: Primary | ICD-10-CM

## 2020-02-03 PROCEDURE — 99207 ZZC PRENATAL VISIT: CPT | Performed by: FAMILY MEDICINE

## 2020-02-03 PROCEDURE — 90715 TDAP VACCINE 7 YRS/> IM: CPT | Performed by: FAMILY MEDICINE

## 2020-02-03 PROCEDURE — 90471 IMMUNIZATION ADMIN: CPT | Performed by: FAMILY MEDICINE

## 2020-02-03 NOTE — PROGRESS NOTES
Doing well  No concerns  Passed 3 hour GCT  HGB 10.8 - on iron now  Will give TDAP   Answers for HPI/ROS submitted by the patient on 1/31/2020   Chronic problems general questions HPI Form  How many servings of fruits and vegetables do you eat daily?: 2-3  On average, how many sweetened beverages do you drink each day (Examples: soda, juice, sweet tea, etc.  Do NOT count diet or artificially sweetened beverages)?: 1  How many minutes a day do you exercise enough to make your heart beat faster?: 9 or less  How many days a week do you exercise enough to make your heart beat faster?: 3 or less  How many days per week do you miss taking your medication?: 0

## 2020-02-24 ENCOUNTER — PRENATAL OFFICE VISIT (OUTPATIENT)
Dept: FAMILY MEDICINE | Facility: CLINIC | Age: 35
End: 2020-02-24
Payer: COMMERCIAL

## 2020-02-24 VITALS
SYSTOLIC BLOOD PRESSURE: 114 MMHG | DIASTOLIC BLOOD PRESSURE: 74 MMHG | OXYGEN SATURATION: 100 % | HEART RATE: 72 BPM | BODY MASS INDEX: 27.72 KG/M2 | HEIGHT: 67 IN | RESPIRATION RATE: 16 BRPM | WEIGHT: 176.6 LBS | TEMPERATURE: 98 F

## 2020-02-24 DIAGNOSIS — O99.019 ANEMIA DURING PREGNANCY: ICD-10-CM

## 2020-02-24 DIAGNOSIS — Z34.93 PRENATAL CARE IN THIRD TRIMESTER: Primary | ICD-10-CM

## 2020-02-24 PROCEDURE — 99207 ZZC PRENATAL VISIT: CPT | Performed by: FAMILY MEDICINE

## 2020-02-24 ASSESSMENT — MIFFLIN-ST. JEOR: SCORE: 1525.74

## 2020-02-24 NOTE — PROGRESS NOTES
Doing well  No concerns  Answers for HPI/ROS submitted by the patient on 2/21/2020   Chronic problems general questions HPI Form  How many servings of fruits and vegetables do you eat daily?: 2-3  On average, how many sweetened beverages do you drink each day (Examples: soda, juice, sweet tea, etc.  Do NOT count diet or artificially sweetened beverages)?: 2  How many minutes a day do you exercise enough to make your heart beat faster?: 10 to 19  How many days a week do you exercise enough to make your heart beat faster?: 3 or less  How many days per week do you miss taking your medication?: 0

## 2020-02-24 NOTE — PATIENT INSTRUCTIONS
Thank you for choosing [facility] today for your health care needs.     [facility] is transforming primary care  At [facility], we re constantly working to improve how we serve our patients and communities. We re currently making changes to the way we deliver care. Most of the work is behind the scenes, but you ll benefit from our efforts to make it easier and more convenient to stay connected to [facility] and your care team to maintain your optimal health.    Benefits of a primary care provider  If you don t have a designated primary care provider yet, we encourage you to get to know our care team online, and find a provider you d like to see. Most of our providers have a short video on their online provider page. Visit [email or web address] to explore our providers and locations.     Benefits of having a primary care provider include:      A provider who sees you regularly is more likely to notice changes in your health.    They get to know you - your health history, family history and goals, making it easier to make a health plan together.     You get to know them - making health-related conversations and decisions easier      Primary care doctors help you when you re sick or hurt - but also focus on keeping you healthy with preventive care and screenings.     Online access to your health records and care team  [email or web address] is our online tool that makes it easy to see your health care information and communicate with your care team. [email or web address] allows you to:          View your health maintenance plan so you know when you re due for a preventive screening    Send secure messages to your care team    View your health history and visit summaries     Schedule appointments     Complete questionnaires and eCheck-in before appointments      Get care from your provider with an e-visit      View and pay your bill     Sign up at [email or web address]. Once you have an account, you also can download the mobile romulo.     Convenient care options    Online or by phone:     E-visit:  When you need care, but don t need a face-to-face appointment, complete an e-visit in [employer]. Your provider will respond within one business day.    Phone visit: A convenient and cost-effective option for follow-up visits or addressing common conditions. Schedule a phone visit by calling the clinic.     OnCare: Our online clinic is available 24/7 for treatment of dozens of common conditions. Complete an online interview, then a [facility] provider will respond in an hour or less. Start a visit at [email or web address].       In-person     Clinic appointments: Schedule an appointment at a clinic close to you anytime online at [email or web address] or call [facility].     Urgent Care: Visit one of our Urgent Care locations throughout the Health system. View locations and estimated wait times at [email or web address].

## 2020-03-06 NOTE — PROGRESS NOTES
Pre-Visit Planning     Future Appointments   Date Time Provider Department Center   3/9/2020  4:00 PM Madison Turner MD [facility]   3/30/2020  8:30 AM Madison Turner MD [facility]   4/6/2020  5:55 PM Madison Turner MD [facility]   4/13/2020  5:55 PM Madison Turner MD [facility]   4/20/2020  5:30 PM Madison Turner MD [facility]   4/27/2020  9:00 AM Madison Turner MD [facility]     Arrival Time for this Appointment:  3:45 PM   Appointment Notes for this encounter:   prenatal follow up    Questionnaires Reviewed/Assigned      Unable to reach. Left voicemail. Advised patient to call clinic back at [phone].  Gislele Torres RN

## 2020-03-09 ENCOUNTER — PRENATAL OFFICE VISIT (OUTPATIENT)
Dept: FAMILY MEDICINE | Facility: CLINIC | Age: 35
End: 2020-03-09
Payer: COMMERCIAL

## 2020-03-09 VITALS
TEMPERATURE: 97.9 F | WEIGHT: 181 LBS | OXYGEN SATURATION: 100 % | BODY MASS INDEX: 28.78 KG/M2 | HEART RATE: 84 BPM | SYSTOLIC BLOOD PRESSURE: 112 MMHG | DIASTOLIC BLOOD PRESSURE: 68 MMHG

## 2020-03-09 DIAGNOSIS — Z34.93 PRENATAL CARE IN THIRD TRIMESTER: Primary | ICD-10-CM

## 2020-03-09 DIAGNOSIS — O99.019 ANEMIA DURING PREGNANCY: ICD-10-CM

## 2020-03-09 PROCEDURE — 99207 ZZC PRENATAL VISIT: CPT | Performed by: FAMILY MEDICINE

## 2020-03-30 ENCOUNTER — PRENATAL OFFICE VISIT (OUTPATIENT)
Dept: FAMILY MEDICINE | Facility: CLINIC | Age: 35
End: 2020-03-30
Payer: COMMERCIAL

## 2020-03-30 VITALS
HEART RATE: 88 BPM | RESPIRATION RATE: 16 BRPM | TEMPERATURE: 98.1 F | HEIGHT: 66 IN | DIASTOLIC BLOOD PRESSURE: 87 MMHG | OXYGEN SATURATION: 98 % | WEIGHT: 184 LBS | SYSTOLIC BLOOD PRESSURE: 131 MMHG | BODY MASS INDEX: 29.57 KG/M2

## 2020-03-30 DIAGNOSIS — Z34.93 PRENATAL CARE IN THIRD TRIMESTER: ICD-10-CM

## 2020-03-30 DIAGNOSIS — R03.0 ELEVATED BLOOD PRESSURE READING WITHOUT DIAGNOSIS OF HYPERTENSION: Primary | ICD-10-CM

## 2020-03-30 LAB
ALBUMIN UR QL STRIP: NEGATIVE MG/DL
GLUCOSE UR STRIP-MCNC: NEGATIVE MG/DL
HGB BLD-MCNC: 11.6 G/DL (ref 11.7–15.7)
T PALLIDUM AB SER QL: NONREACTIVE

## 2020-03-30 PROCEDURE — 00000218 ZZHCL STATISTIC OBHBG - HEMOGLOBIN: Performed by: FAMILY MEDICINE

## 2020-03-30 PROCEDURE — 00000219 ZZHCL STATISTIC OBSA - URINALYSIS: Performed by: FAMILY MEDICINE

## 2020-03-30 PROCEDURE — 86780 TREPONEMA PALLIDUM: CPT | Performed by: FAMILY MEDICINE

## 2020-03-30 PROCEDURE — 99207 ZZC PRENATAL VISIT: CPT | Performed by: FAMILY MEDICINE

## 2020-03-30 PROCEDURE — 36415 COLL VENOUS BLD VENIPUNCTURE: CPT | Performed by: FAMILY MEDICINE

## 2020-03-30 PROCEDURE — 87653 STREP B DNA AMP PROBE: CPT | Performed by: FAMILY MEDICINE

## 2020-03-30 ASSESSMENT — MIFFLIN-ST. JEOR: SCORE: 1551.37

## 2020-03-30 NOTE — PROGRESS NOTES
Doing well  Some tightness and achiness in feet and hands in am that last about an hour  Occasional tightening  Will collect GBS and OBHGB today    Answers for HPI/ROS submitted by the patient on 3/29/2020   Chronic problems general questions HPI Form  How many servings of fruits and vegetables do you eat daily?: 2-3  On average, how many sweetened beverages do you drink each day (Examples: soda, juice, sweet tea, etc.  Do NOT count diet or artificially sweetened beverages)?: 2  How many minutes a day do you exercise enough to make your heart beat faster?: 10 to 19  How many days a week do you exercise enough to make your heart beat faster?: 3 or less  How many days per week do you miss taking your medication?: 0

## 2020-03-31 LAB
GP B STREP DNA SPEC QL NAA+PROBE: NEGATIVE
SPECIMEN SOURCE: NORMAL

## 2020-04-06 ENCOUNTER — PRENATAL OFFICE VISIT (OUTPATIENT)
Dept: FAMILY MEDICINE | Facility: CLINIC | Age: 35
End: 2020-04-06
Payer: COMMERCIAL

## 2020-04-06 VITALS
HEART RATE: 72 BPM | BODY MASS INDEX: 29.54 KG/M2 | WEIGHT: 183 LBS | RESPIRATION RATE: 16 BRPM | TEMPERATURE: 98 F | OXYGEN SATURATION: 98 % | DIASTOLIC BLOOD PRESSURE: 82 MMHG | SYSTOLIC BLOOD PRESSURE: 130 MMHG

## 2020-04-06 DIAGNOSIS — Z34.93 PRENATAL CARE IN THIRD TRIMESTER: Primary | ICD-10-CM

## 2020-04-06 PROCEDURE — 99207 ZZC PRENATAL VISIT: CPT | Performed by: FAMILY MEDICINE

## 2020-04-06 NOTE — PROGRESS NOTES
EXAMINER WORE A MASK BUT PATIENT DID NOT  Same since last time  No more pressure or contractions  Will see [facility] next week and likely the next  GBS was NEG  Answers for HPI/ROS submitted by the patient on 3/29/2020   Chronic problems general questions HPI Form  How many servings of fruits and vegetables do you eat daily?: 2-3  On average, how many sweetened beverages do you drink each day (Examples: soda, juice, sweet tea, etc.  Do NOT count diet or artificially sweetened beverages)?: 2  How many minutes a day do you exercise enough to make your heart beat faster?: 10 to 19  How many days a week do you exercise enough to make your heart beat faster?: 3 or less  How many days per week do you miss taking your medication?: 0

## 2020-04-06 NOTE — PROGRESS NOTES
Answers for HPI/ROS submitted by the patient on 3/29/2020   Chronic problems general questions HPI Form  How many servings of fruits and vegetables do you eat daily?: 2-3  On average, how many sweetened beverages do you drink each day (Examples: soda, juice, sweet tea, etc.  Do NOT count diet or artificially sweetened beverages)?: 2  How many minutes a day do you exercise enough to make your heart beat faster?: 10 to 19  How many days a week do you exercise enough to make your heart beat faster?: 3 or less  How many days per week do you miss taking your medication?: 0

## 2020-04-13 ENCOUNTER — PRENATAL OFFICE VISIT (OUTPATIENT)
Dept: FAMILY MEDICINE | Facility: CLINIC | Age: 35
End: 2020-04-13
Payer: COMMERCIAL

## 2020-04-13 VITALS
SYSTOLIC BLOOD PRESSURE: 110 MMHG | TEMPERATURE: 98 F | WEIGHT: 184 LBS | DIASTOLIC BLOOD PRESSURE: 74 MMHG | BODY MASS INDEX: 29.7 KG/M2 | HEART RATE: 96 BPM | RESPIRATION RATE: 14 BRPM | OXYGEN SATURATION: 97 %

## 2020-04-13 DIAGNOSIS — Z34.03 ENCOUNTER FOR SUPERVISION OF NORMAL FIRST PREGNANCY IN THIRD TRIMESTER: Primary | ICD-10-CM

## 2020-04-13 PROCEDURE — 99207 ZZC PRENATAL VISIT: CPT | Performed by: FAMILY MEDICINE

## 2020-04-13 ASSESSMENT — ENCOUNTER SYMPTOMS
HEADACHES: 0
FEVER: 0
VOMITING: 0
NAUSEA: 0
PALPITATIONS: 0
DOUBLE VISION: 0
DYSURIA: 0
SHORTNESS OF BREATH: 0
BLURRED VISION: 0
BACK PAIN: 0
CONSTIPATION: 0

## 2020-04-13 NOTE — PROGRESS NOTES
HPI  Feeling well, no acute concerns today.  Good fetal movement.  No bleeding, contractions, LOF.   Denies HA, vision change, CP, dyspnea, n/v, dysuria, edema.      Review of Systems   Constitutional: Negative for fever.   Eyes: Negative for blurred vision and double vision.   Respiratory: Negative for shortness of breath.    Cardiovascular: Negative for chest pain, palpitations and leg swelling.   Gastrointestinal: Negative for constipation, nausea and vomiting.   Genitourinary: Negative for dysuria and urgency.   Musculoskeletal: Negative for back pain.   Neurological: Negative for headaches.         Physical Exam  Vitals signs and nursing note reviewed.   Constitutional:       General: She is not in acute distress.     Appearance: She is well-developed.   Cardiovascular:      Rate and Rhythm: Normal rate and regular rhythm.      Heart sounds: Normal heart sounds.   Pulmonary:      Effort: Pulmonary effort is normal.      Breath sounds: Normal breath sounds.   Abdominal:      General: Bowel sounds are normal.      Palpations: Abdomen is soft.      Tenderness: There is no abdominal tenderness.   Skin:     General: Skin is warm and dry.   Neurological:      Mental Status: She is alert and oriented to person, place, and time.

## 2020-04-20 ENCOUNTER — PRENATAL OFFICE VISIT (OUTPATIENT)
Dept: FAMILY MEDICINE | Facility: CLINIC | Age: 35
End: 2020-04-20
Payer: COMMERCIAL

## 2020-04-20 VITALS
DIASTOLIC BLOOD PRESSURE: 84 MMHG | WEIGHT: 183 LBS | RESPIRATION RATE: 14 BRPM | TEMPERATURE: 97.9 F | SYSTOLIC BLOOD PRESSURE: 120 MMHG | HEART RATE: 72 BPM | BODY MASS INDEX: 29.54 KG/M2

## 2020-04-20 DIAGNOSIS — Z34.03 ENCOUNTER FOR SUPERVISION OF NORMAL FIRST PREGNANCY IN THIRD TRIMESTER: Primary | ICD-10-CM

## 2020-04-20 PROCEDURE — 99207 ZZC PRENATAL VISIT: CPT | Performed by: FAMILY MEDICINE

## 2020-04-20 ASSESSMENT — ENCOUNTER SYMPTOMS
PALPITATIONS: 0
PHOTOPHOBIA: 0
HEADACHES: 0
ABDOMINAL PAIN: 0
VOMITING: 0
NAUSEA: 0
SHORTNESS OF BREATH: 0
CONSTITUTIONAL NEGATIVE: 1

## 2020-04-20 NOTE — PROGRESS NOTES
Subjective     Susan Rubalcava is a 34 year old female who presents to clinic today for the following health issues:    History of Present Illness        She eats 2-3 servings of fruits and vegetables daily.She consumes 1 sweetened beverage(s) daily.She exercises with enough effort to increase her heart rate 10 to 19 minutes per day.  She exercises with enough effort to increase her heart rate 3 or less days per week.   She is taking medications regularly.     39w2d,  feeling well apart from fatigue and heartburn.  Good fetal movement, no contractions, bleeding, LOF.  Denies HA, vision change, CP, dyspnea, n/v, dysuria, edema.         Review of Systems   Constitutional: Negative.    Eyes: Negative for photophobia.   Respiratory: Negative for shortness of breath.    Cardiovascular: Negative for chest pain and palpitations.   Gastrointestinal: Negative for abdominal pain, nausea and vomiting.   Neurological: Negative for headaches.        Objective    /84 (BP Location: Left arm, Patient Position: Sitting, Cuff Size: Adult Regular)   Pulse 72   Temp 97.9  F (36.6  C) (Oral)   Resp 14   Wt 83 kg (183 lb)   LMP 2019   BMI 29.54 kg/m    Body mass index is 29.54 kg/m .  Physical Exam  Vitals signs reviewed.   Eyes:      Conjunctiva/sclera: Conjunctivae normal.   Cardiovascular:      Rate and Rhythm: Normal rate and regular rhythm.      Heart sounds: Normal heart sounds.   Pulmonary:      Effort: Pulmonary effort is normal.      Breath sounds: Normal breath sounds.   Abdominal:      General: Bowel sounds are normal.      Palpations: Abdomen is soft.      Tenderness: There is no abdominal tenderness. There is no guarding or rebound.   Skin:     General: Skin is warm and dry.   Neurological:      Mental Status: She is alert and oriented to person, place, and time.

## 2020-04-27 ENCOUNTER — PRENATAL OFFICE VISIT (OUTPATIENT)
Dept: FAMILY MEDICINE | Facility: CLINIC | Age: 35
End: 2020-04-27
Payer: COMMERCIAL

## 2020-04-27 VITALS
OXYGEN SATURATION: 98 % | RESPIRATION RATE: 16 BRPM | WEIGHT: 182 LBS | SYSTOLIC BLOOD PRESSURE: 121 MMHG | HEART RATE: 85 BPM | BODY MASS INDEX: 29.38 KG/M2 | TEMPERATURE: 98.2 F | DIASTOLIC BLOOD PRESSURE: 89 MMHG

## 2020-04-27 DIAGNOSIS — O99.019 ANEMIA DURING PREGNANCY: ICD-10-CM

## 2020-04-27 DIAGNOSIS — Z34.93 PRENATAL CARE IN THIRD TRIMESTER: Primary | ICD-10-CM

## 2020-04-27 PROCEDURE — 99207 ZZC PRENATAL VISIT: CPT | Performed by: FAMILY MEDICINE

## 2020-04-27 NOTE — PROGRESS NOTES
Some increased pressure  No contractions  Discussed induction at 41 weeks  Answers for HPI/ROS submitted by the patient on 4/26/2020   Chronic problems general questions HPI Form  How many servings of fruits and vegetables do you eat daily?: 2-3  On average, how many sweetened beverages do you drink each day (Examples: soda, juice, sweet tea, etc.  Do NOT count diet or artificially sweetened beverages)?: 1  How many minutes a day do you exercise enough to make your heart beat faster?: 10 to 19  How many days a week do you exercise enough to make your heart beat faster?: 3 or less  How many days per week do you miss taking your medication?: 0

## 2020-05-02 ENCOUNTER — HOSPITAL ENCOUNTER (INPATIENT)
Facility: CLINIC | Age: 35
LOS: 5 days | Discharge: HOME | End: 2020-05-07
Attending: FAMILY MEDICINE | Admitting: FAMILY MEDICINE
Payer: COMMERCIAL

## 2020-05-02 DIAGNOSIS — Z98.891 S/P CESAREAN SECTION: Primary | ICD-10-CM

## 2020-05-02 LAB
ABO + RH BLD: NORMAL
ABO + RH BLD: NORMAL
HGB BLD-MCNC: 11.6 G/DL (ref 11.7–15.7)
SPECIMEN EXP DATE BLD: NORMAL

## 2020-05-02 PROCEDURE — 86900 BLOOD TYPING SEROLOGIC ABO: CPT | Performed by: FAMILY MEDICINE

## 2020-05-02 PROCEDURE — 85018 HEMOGLOBIN: CPT | Performed by: FAMILY MEDICINE

## 2020-05-02 PROCEDURE — 25000132 ZZH RX MED GY IP 250 OP 250 PS 637: Performed by: FAMILY MEDICINE

## 2020-05-02 PROCEDURE — 86901 BLOOD TYPING SEROLOGIC RH(D): CPT | Performed by: FAMILY MEDICINE

## 2020-05-02 PROCEDURE — 86780 TREPONEMA PALLIDUM: CPT | Performed by: FAMILY MEDICINE

## 2020-05-02 PROCEDURE — 12000000 ZZH R&B MED SURG/OB

## 2020-05-02 PROCEDURE — 87635 SARS-COV-2 COVID-19 AMP PRB: CPT | Performed by: FAMILY MEDICINE

## 2020-05-02 RX ORDER — SODIUM CHLORIDE, SODIUM LACTATE, POTASSIUM CHLORIDE, CALCIUM CHLORIDE 600; 310; 30; 20 MG/100ML; MG/100ML; MG/100ML; MG/100ML
INJECTION, SOLUTION INTRAVENOUS CONTINUOUS
Status: DISCONTINUED | OUTPATIENT
Start: 2020-05-02 | End: 2020-05-04

## 2020-05-02 RX ORDER — IBUPROFEN 800 MG/1
800 TABLET, FILM COATED ORAL
Status: DISCONTINUED | OUTPATIENT
Start: 2020-05-02 | End: 2020-05-04

## 2020-05-02 RX ORDER — ACETAMINOPHEN 325 MG/1
650 TABLET ORAL EVERY 4 HOURS PRN
Status: DISCONTINUED | OUTPATIENT
Start: 2020-05-02 | End: 2020-05-04

## 2020-05-02 RX ORDER — CARBOPROST TROMETHAMINE 250 UG/ML
250 INJECTION, SOLUTION INTRAMUSCULAR
Status: COMPLETED | OUTPATIENT
Start: 2020-05-02 | End: 2020-05-04

## 2020-05-02 RX ORDER — FENTANYL CITRATE 50 UG/ML
50-100 INJECTION, SOLUTION INTRAMUSCULAR; INTRAVENOUS
Status: DISCONTINUED | OUTPATIENT
Start: 2020-05-02 | End: 2020-05-04

## 2020-05-02 RX ORDER — ZOLPIDEM TARTRATE 5 MG/1
5 TABLET ORAL
Status: COMPLETED | OUTPATIENT
Start: 2020-05-02 | End: 2020-05-02

## 2020-05-02 RX ORDER — NALOXONE HYDROCHLORIDE 0.4 MG/ML
.1-.4 INJECTION, SOLUTION INTRAMUSCULAR; INTRAVENOUS; SUBCUTANEOUS
Status: DISCONTINUED | OUTPATIENT
Start: 2020-05-02 | End: 2020-05-03

## 2020-05-02 RX ORDER — OXYTOCIN/0.9 % SODIUM CHLORIDE 30/500 ML
100-340 PLASTIC BAG, INJECTION (ML) INTRAVENOUS CONTINUOUS PRN
Status: COMPLETED | OUTPATIENT
Start: 2020-05-02 | End: 2020-05-04

## 2020-05-02 RX ORDER — TRANEXAMIC ACID 10 MG/ML
1 INJECTION, SOLUTION INTRAVENOUS EVERY 30 MIN PRN
Status: DISCONTINUED | OUTPATIENT
Start: 2020-05-02 | End: 2020-05-04

## 2020-05-02 RX ORDER — OXYTOCIN 10 [USP'U]/ML
10 INJECTION, SOLUTION INTRAMUSCULAR; INTRAVENOUS
Status: DISCONTINUED | OUTPATIENT
Start: 2020-05-02 | End: 2020-05-04

## 2020-05-02 RX ORDER — ONDANSETRON 2 MG/ML
4 INJECTION INTRAMUSCULAR; INTRAVENOUS EVERY 6 HOURS PRN
Status: DISCONTINUED | OUTPATIENT
Start: 2020-05-02 | End: 2020-05-03

## 2020-05-02 RX ORDER — METHYLERGONOVINE MALEATE 0.2 MG/ML
200 INJECTION INTRAVENOUS
Status: DISCONTINUED | OUTPATIENT
Start: 2020-05-02 | End: 2020-05-04

## 2020-05-02 RX ORDER — MISOPROSTOL 100 UG/1
25 TABLET ORAL
Status: DISCONTINUED | OUTPATIENT
Start: 2020-05-02 | End: 2020-05-04

## 2020-05-02 RX ORDER — OXYCODONE AND ACETAMINOPHEN 5; 325 MG/1; MG/1
1 TABLET ORAL
Status: DISCONTINUED | OUTPATIENT
Start: 2020-05-02 | End: 2020-05-04

## 2020-05-02 RX ADMIN — Medication 25 MCG: at 23:54

## 2020-05-02 RX ADMIN — Medication 25 MCG: at 21:42

## 2020-05-02 RX ADMIN — ZOLPIDEM TARTRATE 5 MG: 5 TABLET, COATED ORAL at 23:54

## 2020-05-02 ASSESSMENT — ACTIVITIES OF DAILY LIVING (ADL)
RETIRED_COMMUNICATION: 0-->UNDERSTANDS/COMMUNICATES WITHOUT DIFFICULTY
AMBULATION: 0-->INDEPENDENT
TRANSFERRING: 0-->INDEPENDENT
TOILETING: 0-->INDEPENDENT
BATHING: 0-->INDEPENDENT
COGNITION: 0 - NO COGNITION ISSUES REPORTED
DRESS: 0-->INDEPENDENT
FALL_HISTORY_WITHIN_LAST_SIX_MONTHS: NO
SWALLOWING: 0-->SWALLOWS FOODS/LIQUIDS WITHOUT DIFFICULTY
RETIRED_EATING: 0-->INDEPENDENT

## 2020-05-02 ASSESSMENT — MIFFLIN-ST. JEOR: SCORE: 1542.3

## 2020-05-03 ENCOUNTER — ANESTHESIA (OUTPATIENT)
Dept: OBGYN | Facility: CLINIC | Age: 35
End: 2020-05-03
Payer: COMMERCIAL

## 2020-05-03 ENCOUNTER — ANESTHESIA EVENT (OUTPATIENT)
Dept: OBGYN | Facility: CLINIC | Age: 35
End: 2020-05-03
Payer: COMMERCIAL

## 2020-05-03 LAB
SARS-COV-2 PCR COMMENT: NORMAL
SARS-COV-2 RNA SPEC QL NAA+PROBE: NEGATIVE
SARS-COV-2 RNA SPEC QL NAA+PROBE: NORMAL
SPECIMEN SOURCE: NORMAL
SPECIMEN SOURCE: NORMAL
T PALLIDUM AB SER QL: NONREACTIVE

## 2020-05-03 PROCEDURE — 25000128 H RX IP 250 OP 636: Performed by: ANESTHESIOLOGY

## 2020-05-03 PROCEDURE — 40000671 ZZH STATISTIC ANESTHESIA CASE

## 2020-05-03 PROCEDURE — 25800030 ZZH RX IP 258 OP 636: Performed by: FAMILY MEDICINE

## 2020-05-03 PROCEDURE — 3E0R3BZ INTRODUCTION OF ANESTHETIC AGENT INTO SPINAL CANAL, PERCUTANEOUS APPROACH: ICD-10-PCS | Performed by: ANESTHESIOLOGY

## 2020-05-03 PROCEDURE — 25000132 ZZH RX MED GY IP 250 OP 250 PS 637: Performed by: FAMILY MEDICINE

## 2020-05-03 PROCEDURE — 12000000 ZZH R&B MED SURG/OB

## 2020-05-03 PROCEDURE — 10907ZC DRAINAGE OF AMNIOTIC FLUID, THERAPEUTIC FROM PRODUCTS OF CONCEPTION, VIA NATURAL OR ARTIFICIAL OPENING: ICD-10-PCS | Performed by: FAMILY MEDICINE

## 2020-05-03 PROCEDURE — 37000011 ZZH ANESTHESIA WARD SERVICE

## 2020-05-03 PROCEDURE — 00HU33Z INSERTION OF INFUSION DEVICE INTO SPINAL CANAL, PERCUTANEOUS APPROACH: ICD-10-PCS | Performed by: ANESTHESIOLOGY

## 2020-05-03 RX ORDER — NALOXONE HYDROCHLORIDE 0.4 MG/ML
.1-.4 INJECTION, SOLUTION INTRAMUSCULAR; INTRAVENOUS; SUBCUTANEOUS
Status: DISCONTINUED | OUTPATIENT
Start: 2020-05-03 | End: 2020-05-06

## 2020-05-03 RX ORDER — CALCIUM GLUCONATE 94 MG/ML
1 INJECTION, SOLUTION INTRAVENOUS
Status: DISCONTINUED | OUTPATIENT
Start: 2020-05-03 | End: 2020-05-07 | Stop reason: HOSPADM

## 2020-05-03 RX ORDER — MAGNESIUM SULFATE HEPTAHYDRATE 40 MG/ML
2 INJECTION, SOLUTION INTRAVENOUS
Status: DISCONTINUED | OUTPATIENT
Start: 2020-05-03 | End: 2020-05-07 | Stop reason: HOSPADM

## 2020-05-03 RX ORDER — CALCIUM CARBONATE 500 MG/1
1000 TABLET, CHEWABLE ORAL EVERY 4 HOURS PRN
Status: DISCONTINUED | OUTPATIENT
Start: 2020-05-03 | End: 2020-05-04

## 2020-05-03 RX ORDER — MAGNESIUM SULFATE HEPTAHYDRATE 40 MG/ML
4 INJECTION, SOLUTION INTRAVENOUS
Status: DISCONTINUED | OUTPATIENT
Start: 2020-05-03 | End: 2020-05-07 | Stop reason: HOSPADM

## 2020-05-03 RX ORDER — LORAZEPAM 2 MG/ML
2 INJECTION INTRAMUSCULAR
Status: DISCONTINUED | OUTPATIENT
Start: 2020-05-03 | End: 2020-05-07 | Stop reason: HOSPADM

## 2020-05-03 RX ORDER — MAGNESIUM SULFATE HEPTAHYDRATE 500 MG/ML
4 INJECTION, SOLUTION INTRAMUSCULAR; INTRAVENOUS
Status: DISCONTINUED | OUTPATIENT
Start: 2020-05-03 | End: 2020-05-07 | Stop reason: HOSPADM

## 2020-05-03 RX ORDER — SODIUM CHLORIDE, SODIUM LACTATE, POTASSIUM CHLORIDE, CALCIUM CHLORIDE 600; 310; 30; 20 MG/100ML; MG/100ML; MG/100ML; MG/100ML
10-125 INJECTION, SOLUTION INTRAVENOUS CONTINUOUS
Status: DISCONTINUED | OUTPATIENT
Start: 2020-05-03 | End: 2020-05-07 | Stop reason: HOSPADM

## 2020-05-03 RX ORDER — MAGNESIUM SULFATE IN WATER 40 MG/ML
1 INJECTION, SOLUTION INTRAVENOUS CONTINUOUS
Status: DISCONTINUED | OUTPATIENT
Start: 2020-05-04 | End: 2020-05-05

## 2020-05-03 RX ORDER — BUPIVACAINE HYDROCHLORIDE 2.5 MG/ML
INJECTION, SOLUTION EPIDURAL; INFILTRATION; INTRACAUDAL PRN
Status: DISCONTINUED | OUTPATIENT
Start: 2020-05-03 | End: 2020-05-03

## 2020-05-03 RX ORDER — ONDANSETRON 2 MG/ML
4 INJECTION INTRAMUSCULAR; INTRAVENOUS EVERY 6 HOURS PRN
Status: DISCONTINUED | OUTPATIENT
Start: 2020-05-03 | End: 2020-05-06

## 2020-05-03 RX ORDER — LIDOCAINE HYDROCHLORIDE AND EPINEPHRINE 15; 5 MG/ML; UG/ML
3 INJECTION, SOLUTION EPIDURAL
Status: DISCONTINUED | OUTPATIENT
Start: 2020-05-03 | End: 2020-05-06

## 2020-05-03 RX ORDER — NIFEDIPINE 10 MG/1
10 CAPSULE ORAL
Status: DISCONTINUED | OUTPATIENT
Start: 2020-05-03 | End: 2020-05-07 | Stop reason: HOSPADM

## 2020-05-03 RX ORDER — NALBUPHINE HYDROCHLORIDE 20 MG/ML
2.5-5 INJECTION, SOLUTION INTRAMUSCULAR; INTRAVENOUS; SUBCUTANEOUS EVERY 6 HOURS PRN
Status: DISCONTINUED | OUTPATIENT
Start: 2020-05-03 | End: 2020-05-06

## 2020-05-03 RX ORDER — PHENYLEPHRINE HCL IN 0.9% NACL 1 MG/10 ML
100 SYRINGE (ML) INTRAVENOUS EVERY 5 MIN PRN
Status: DISCONTINUED | OUTPATIENT
Start: 2020-05-03 | End: 2020-05-06

## 2020-05-03 RX ORDER — MAGNESIUM SULFATE HEPTAHYDRATE 40 MG/ML
4 INJECTION, SOLUTION INTRAVENOUS ONCE
Status: COMPLETED | OUTPATIENT
Start: 2020-05-03 | End: 2020-05-04

## 2020-05-03 RX ORDER — ONDANSETRON 4 MG/1
4 TABLET, ORALLY DISINTEGRATING ORAL EVERY 6 HOURS PRN
Status: DISCONTINUED | OUTPATIENT
Start: 2020-05-03 | End: 2020-05-06

## 2020-05-03 RX ADMIN — Medication: at 21:13

## 2020-05-03 RX ADMIN — BUPIVACAINE HYDROCHLORIDE 10 ML: 2.5 INJECTION, SOLUTION EPIDURAL; INFILTRATION; INTRACAUDAL at 13:17

## 2020-05-03 RX ADMIN — ACETAMINOPHEN 650 MG: 325 TABLET, FILM COATED ORAL at 04:07

## 2020-05-03 RX ADMIN — CALCIUM CARBONATE (ANTACID) CHEW TAB 500 MG 1000 MG: 500 CHEW TAB at 21:38

## 2020-05-03 RX ADMIN — SODIUM CHLORIDE, POTASSIUM CHLORIDE, SODIUM LACTATE AND CALCIUM CHLORIDE: 600; 310; 30; 20 INJECTION, SOLUTION INTRAVENOUS at 19:19

## 2020-05-03 RX ADMIN — Medication 25 MCG: at 04:19

## 2020-05-03 RX ADMIN — SODIUM CHLORIDE, POTASSIUM CHLORIDE, SODIUM LACTATE AND CALCIUM CHLORIDE 1000 ML: 600; 310; 30; 20 INJECTION, SOLUTION INTRAVENOUS at 13:06

## 2020-05-03 RX ADMIN — Medication 10 ML/HR: at 13:19

## 2020-05-03 RX ADMIN — SODIUM CHLORIDE, POTASSIUM CHLORIDE, SODIUM LACTATE AND CALCIUM CHLORIDE 500 ML: 600; 310; 30; 20 INJECTION, SOLUTION INTRAVENOUS at 01:44

## 2020-05-03 RX ADMIN — Medication 25 MCG: at 06:23

## 2020-05-03 NOTE — PROVIDER NOTIFICATION
05/03/20 0336 05/03/20 0401 05/03/20 0413   Provider Notification   Provider Name/Title Dr. Johnny Turner   Method of Notification Electronic Page Electronic Page  (no response to previous page ) Phone   Request  --   --  Evaluate - Remote   Notification Reason  --   --  Uterine Activity     Called MD on cell phone, gave fluid bolus and skipped last cytotec dose due to long contractions. Since fluid flush and skipping of dose, contractions have shortened to 1-2 minutes in length some coupling present. FHR tracing has moderate variability with accels, no variables or decels present in last hour of tracing. Pt sleeping through contractions.      MD ordered to proceed with next cytotec dose.

## 2020-05-03 NOTE — PROVIDER NOTIFICATION
05/03/20 0652   Provider Notification   Provider Name/Title Dr. Veras   Method of Notification At Bedside   Request Evaluate in Person     MD bedside for prolonged decel. decel down to 60s lasting 5-6 minutes. Pt repositioned from side to side without resolve, pt positioned to hands and knees. MD offered FSE placement but baby was easily traceable overnight. IV fluid bolus started. Baby came back gradually to a new baseline of 115-120.     Dr. Veras updated Dr. Madison Turner via phone after being bedside.

## 2020-05-03 NOTE — PROVIDER NOTIFICATION
05/03/20 0757   Provider Notification   Provider Name/Title o angie   Method of Notification Phone   Request Evaluate in Person   Notification Reason Status Update   updated on pt report of blood dripping down leg upon standing and blood in toilet with void. No visible blood present upon visualization,

## 2020-05-03 NOTE — ANESTHESIA PREPROCEDURE EVALUATION
Anesthesia Pre-Procedure Evaluation    Patient: Susan Rubalcava   MRN: [id number] : 1985          Preoperative Diagnosis: * No surgery found *        Past Medical History:   Diagnosis Date     ASCUS with positive high risk HPV     + HPV 16 colp - CANDIE I     Cervical high risk HPV (human papillomavirus) test positive , ,,,,-     Contact dermatitis and other eczema, due to unspecified cause      LSIL (low grade squamous intraepithelial lesion) on Pap smear , 10/13, ,     +high risk HPV, CANDIE 1 on pathology x 3     Mild dysplasia of cervix 2019    repeat pap with HPV in one year     Rheumatism, unspecified and fibrositis     no repeat episodes since 2004     Rheumatoid arthritis of multiple sites with negative rheumatoid factor (H) 2004    Senior year of high school and first year of college and no symptoms since then.     Past Surgical History:   Procedure Laterality Date     COLPOSCOPY  2019    mild dysplasia with L Mcmanus - repeat pap with HPV in one year     Anesthesia Evaluation       history and physical reviewed .      No history of anesthetic complications          ROS/MED HX    ENT/Pulmonary:  - neg pulmonary ROS     Neurologic:  - neg neurologic ROS     Cardiovascular:  - neg cardiovascular ROS       METS/Exercise Tolerance:     Hematologic:         Musculoskeletal:         GI/Hepatic:  - neg GI/hepatic ROS       Renal/Genitourinary:         Endo:         Psychiatric:         Infectious Disease:         Malignancy:         Other:                     neg OB ROS            Physical Exam  Normal systems: cardiovascular, pulmonary and dental    Airway   Mallampati: II  TM distance: > 3 FB  Neck ROM: full  Mouth opening: > 3 cm    Dental     Cardiovascular       Pulmonary             Lab Results   Component Value Date    WBC 15.0 (H) 2019    HGB 11.6 (L) 2020    HCT 34.6 (L) 2019     2019    CRP 1.5 2007    NA  "134 10/28/2019    POTASSIUM 3.4 10/28/2019    CHLORIDE 103 10/28/2019    CO2 24 10/28/2019    BUN 4 (L) 10/28/2019    CR 0.49 (L) 10/28/2019     (H) 10/28/2019    AGNIESZKA 9.2 10/28/2019    ALBUMIN 4.4 06/11/2010    PROTTOTAL 7.7 06/11/2010    ALT 16 07/26/2010    AST 23 07/26/2010    ALKPHOS 39 (L) 07/26/2010    BILITOTAL 0.5 07/26/2010    TSH 1.54 09/23/2005    HCG Negative 06/04/2019    HCGS Positive (A) 08/17/2019       Preop Vitals  BP Readings from Last 3 Encounters:   05/03/20 136/81   04/27/20 121/89   04/20/20 120/84    Pulse Readings from Last 3 Encounters:   05/03/20 63   04/27/20 85   04/20/20 72      Resp Readings from Last 3 Encounters:   05/03/20 16   04/27/20 16   04/20/20 14    SpO2 Readings from Last 3 Encounters:   04/27/20 98%   04/13/20 97%   04/06/20 98%      Temp Readings from Last 1 Encounters:   05/03/20 98.4  F (36.9  C) (Oral)    Ht Readings from Last 1 Encounters:   05/02/20 1.676 m (5' 6\")      Wt Readings from Last 1 Encounters:   05/02/20 82.6 kg (182 lb)    Estimated body mass index is 29.38 kg/m  as calculated from the following:    Height as of this encounter: 1.676 m (5' 6\").    Weight as of this encounter: 82.6 kg (182 lb).       Anesthesia Plan      History & Physical Review      ASA Status:  2 .  OB Epidural Asa: 2       Plan for Epidural            Postoperative Care      Consents  Anesthetic plan, risks, benefits and alternatives discussed with:  Patient..                 Jose Luis Bagley MD                    .  "

## 2020-05-03 NOTE — PROVIDER NOTIFICATION
"   20   Provider Notification   Provider Name/Title Dr. Madison Turner   Method of Notification Electronic Page Phone   Notification Reason Patient Arrived Patient Arrived   Pt arrived.     , 41w0d for postdates induction, DAVIN .    When monitor was placed on pt baby was in middle of a decel HR in 90s, decel lasted about 1-1.5 minutes on monitor before returning to a normal baseline. Moderate variability present with accelerations since deceleration. Ctxs present about every 9 minutes, palpate mild, pt does not report them as uncomfortable and stated \"oh I thought that was just baby moving\". SVE 2/50/-2 with a wills of 4.     Plan on oral cytotec overnight, intrapartum and cervical ripening orders received along with a 5mg order for ambien overnight for sleep.     "

## 2020-05-03 NOTE — PROVIDER NOTIFICATION
05/02/20 1955   Uterine Activity Assessment   Method external tocotransducer  (monitor applied)   Contraction Intensity no contractions  (per pt report )   Fetal Assessment   Fetal HR Assessment Method external US  (monitor applied )   Fetal HR (beats/min) 87     Monitor applied, fetal HR in mid 80s and returned to normal limits in 60-70 seconds. Pt was sitting in bed, had pt turn to right side w/o change, had pt turn to left side and decel resolved. Educated pt to avoid laying down on back and to keep on a side when lying.

## 2020-05-03 NOTE — PROVIDER NOTIFICATION
05/03/20 0118 05/03/20 0135   Provider Notification   Provider Name/Title Dr. Madison Felton   Method of Notification Electronic Page Phone   Request  --  Evaluate - Remote   Notification Reason Uterine Activity Uterine Activity     Pt has had 2 doses of cytotec. Contractions are 3-4 minutes in length, baby is tolerating with accels and moderate variability, there was one slight deceleration that could be considered a late decel but is hard to tell with how long contractions are lasting.     MD ordered a fluid flush and to hold the next dose to see if contractions lessen in length.

## 2020-05-03 NOTE — PROVIDER NOTIFICATION
05/03/20 1729   Provider Notification   Provider Name/Title teresa   Method of Notification Phone   Notification Reason Status Update;SVE   updated on prolonged decel with position  Change and tetanic UC, SVE

## 2020-05-03 NOTE — PROVIDER NOTIFICATION
05/03/20 0830   Provider Notification   Provider Name/Title teresa   Method of Notification At Bedside   Request Evaluate in Person   Notification Reason Status Update;SVE;Other (Comment)   AROM light mec, old brown blood, strip reviewed

## 2020-05-03 NOTE — PROVIDER NOTIFICATION
05/03/20 1433   Provider Notification   Provider Name/Title teresa   Method of Notification Phone   Notification Reason Status Update   updated on prolonged decel after epidural, SVE and UC pattern

## 2020-05-03 NOTE — PROVIDER NOTIFICATION
05/03/20 1300   Provider Notification   Provider Name/Title alexandra   Method of Notification At Bedside   Notification Reason Pain   epidural placement

## 2020-05-03 NOTE — H&P
H and P:  SUBJECTIVE:  Susan Rubalcava is a  at 41+1 weeks gestation who is here for induction due to postdates pregnancy.   She has had uncomplicated pregnancy.   She had marginal cord insertion or first 20 week ultrasound and MFM did follow up and it was normal.   She is feeling fetal movement.   She received cytotec orally times 4 overnight.    An hour ago she had a prolonged decel and inhouse OB called.   She got fluid bolus and repositioning and fetal heart tones recovered after about 3-4 minutes.   Since then FHT are good with moderate variability.       OB History    Para Term  AB Living   1 0 0 0 0 0   SAB TAB Ectopic Multiple Live Births   0 0 0 0 0      # Outcome Date GA Lbr Jian/2nd Weight Sex Delivery Anes PTL Lv   1 Current              Past Medical History:   Diagnosis Date     ASCUS with positive high risk HPV     + HPV 16 colp - CANDIE I     Cervical high risk HPV (human papillomavirus) test positive , ,,,,-     Contact dermatitis and other eczema, due to unspecified cause      LSIL (low grade squamous intraepithelial lesion) on Pap smear , 10/13, ,     +high risk HPV, CANDIE 1 on pathology x 3     Mild dysplasia of cervix 2019    repeat pap with HPV in one year     Rheumatism, unspecified and fibrositis     no repeat episodes since 2004     Rheumatoid arthritis of multiple sites with negative rheumatoid factor (H) 2004    Senior year of high school and first year of college and no symptoms since then.       Past Surgical History:   Procedure Laterality Date     COLPOSCOPY  2019    mild dysplasia with L Yonathan - repeat pap with HPV in one year       MEDICATIONS:  Current Facility-Administered Medications   Medication     acetaminophen (TYLENOL) tablet 650 mg     carboprost (HEMABATE) injection 250 mcg     fentaNYL (PF) (SUBLIMAZE) injection  mcg     ibuprofen (ADVIL/MOTRIN) tablet 800 mg     lactated ringers BOLUS 1,000 mL  "   Or     lactated ringers BOLUS 500 mL     lactated ringers infusion     lidocaine 1 % 0.1-20 mL     Medication Instructions - cervical ripening and induction medications     Medication Instructions: misoprostol (CYTOTEC)- Nurse to discuss ordering with provider, if needed. Ordered via \"OB misoprostol (CYTOTEC) Postpartum Hemorrhage PANEL\"     methylergonovine (METHERGINE) injection 200 mcg     misoprostol (cervical ripening) (CYTOTEC) quarter-tab 25 mcg     naloxone (NARCAN) injection 0.1-0.4 mg     ondansetron (ZOFRAN) injection 4 mg     oxyCODONE-acetaminophen (PERCOCET) 5-325 MG per tablet 1 tablet     oxytocin (PITOCIN) 30 units in 500 mL 0.9% NaCl infusion     oxytocin (PITOCIN) injection 10 Units     sodium chloride (PF) 0.9% PF flush 3 mL     tranexamic acid 1 g in 100 mL 0.7% NaCl IV bag (premix)       SOCIAL HISTORY:  Social History     Tobacco Use     Smoking status: Never Smoker     Smokeless tobacco: Never Used   Substance Use Topics     Alcohol use: Not Currently       Family History   Problem Relation Age of Onset     Thyroid Disease Mother      Prostate Cancer Father      Prostate Problems Father 61        Prostate Cancer     Breast Cancer Maternal Grandmother      Diabetes Maternal Grandfather         in70's     Heart Disease Paternal Grandmother      Rheumatoid Arthritis Paternal Grandmother      Cerebrovascular Disease Paternal Grandfather      Alzheimer Disease Paternal Grandfather      Mental Illness Cousin      C.A.D. No family hx of      Cancer - colorectal No family hx of        Objective:  Blood pressure 131/63, pulse 63, temperature 98.1  F (36.7  C), temperature source Oral, resp. rate 16, height 1.676 m (5' 6\"), weight 82.6 kg (182 lb), last menstrual period 07/20/2019, not currently breastfeeding.  FHT: 130 and moderately reactive  [place]: every 2-6 with occasional 3 minute duration  Ext: 1+     Labs:   Component      Latest Ref Rng & Units 5/2/2020   ABO       A   RH(D)       Pos "   Specimen Expires       05/05/2020   SARS-CoV-2 Virus Specimen Source       Nasopharyngeal   SARS-CoV-2 PCR Result       NEGATIVE   SARS-CoV-2 PCR Comment       This automated, real-time RT-PCR assay by [employer] on the [employer] has . . .   Hemoglobin      11.7 - 15.7 g/dL 11.6 (L)     Assessment:  1. Primigravida at 41 weeks here for induction  2. Meconium stained AF after AROM  3. Postdates  4. One prolonged decel 20 minutes after last oral cytotec  5. GBS NEG    Plan:  1. AROM (meconium stained and some mild blood show on vag exam)  2. No more cytotec  3. After a few hours reassess labor pattern and maybe start pitocin  4. Expectant management

## 2020-05-04 ENCOUNTER — ANESTHESIA (OUTPATIENT)
Dept: OBGYN | Facility: CLINIC | Age: 35
End: 2020-05-04
Payer: COMMERCIAL

## 2020-05-04 ENCOUNTER — ANESTHESIA EVENT (OUTPATIENT)
Dept: OBGYN | Facility: CLINIC | Age: 35
End: 2020-05-04
Payer: COMMERCIAL

## 2020-05-04 PROBLEM — Z98.891 S/P CESAREAN SECTION: Status: ACTIVE | Noted: 2020-05-04

## 2020-05-04 LAB
ALBUMIN SERPL-MCNC: 2 G/DL (ref 3.4–5)
ALP SERPL-CCNC: 181 U/L (ref 40–150)
ALT SERPL W P-5'-P-CCNC: 112 U/L (ref 0–50)
ALT SERPL W P-5'-P-CCNC: 447 U/L (ref 0–50)
ANION GAP SERPL CALCULATED.3IONS-SCNC: 6 MMOL/L (ref 3–14)
AST SERPL W P-5'-P-CCNC: 104 U/L (ref 0–45)
AST SERPL W P-5'-P-CCNC: 444 U/L (ref 0–45)
BASOPHILS # BLD AUTO: 0.1 10E9/L (ref 0–0.2)
BASOPHILS # BLD AUTO: 0.1 10E9/L (ref 0–0.2)
BASOPHILS NFR BLD AUTO: 0.2 %
BASOPHILS NFR BLD AUTO: 0.2 %
BILIRUB SERPL-MCNC: 0.6 MG/DL (ref 0.2–1.3)
BUN SERPL-MCNC: 13 MG/DL (ref 7–30)
CALCIUM SERPL-MCNC: 7.7 MG/DL (ref 8.5–10.1)
CHLORIDE SERPL-SCNC: 105 MMOL/L (ref 94–109)
CO2 SERPL-SCNC: 24 MMOL/L (ref 20–32)
CREAT SERPL-MCNC: 0.91 MG/DL (ref 0.52–1.04)
CREAT SERPL-MCNC: 1.01 MG/DL (ref 0.52–1.04)
D DIMER PPP FEU-MCNC: 3.2 UG/ML FEU (ref 0–0.5)
DIFFERENTIAL METHOD BLD: ABNORMAL
DIFFERENTIAL METHOD BLD: ABNORMAL
EOSINOPHIL # BLD AUTO: 0 10E9/L (ref 0–0.7)
EOSINOPHIL # BLD AUTO: 0 10E9/L (ref 0–0.7)
EOSINOPHIL NFR BLD AUTO: 0 %
EOSINOPHIL NFR BLD AUTO: 0 %
ERYTHROCYTE [DISTWIDTH] IN BLOOD BY AUTOMATED COUNT: 15.1 % (ref 10–15)
ERYTHROCYTE [DISTWIDTH] IN BLOOD BY AUTOMATED COUNT: 15.3 % (ref 10–15)
ERYTHROCYTE [DISTWIDTH] IN BLOOD BY AUTOMATED COUNT: 15.4 % (ref 10–15)
FIBRINOGEN PPP-MCNC: 410 MG/DL (ref 200–420)
GFR SERPL CREATININE-BSD FRML MDRD: 72 ML/MIN/{1.73_M2}
GFR SERPL CREATININE-BSD FRML MDRD: 82 ML/MIN/{1.73_M2}
GLUCOSE BLDC GLUCOMTR-MCNC: 123 MG/DL (ref 70–99)
GLUCOSE SERPL-MCNC: 135 MG/DL (ref 70–99)
HCT VFR BLD AUTO: 32.3 % (ref 35–47)
HCT VFR BLD AUTO: 34.1 % (ref 35–47)
HCT VFR BLD AUTO: 38.4 % (ref 35–47)
HGB BLD-MCNC: 10.3 G/DL (ref 11.7–15.7)
HGB BLD-MCNC: 11 G/DL (ref 11.7–15.7)
HGB BLD-MCNC: 12.2 G/DL (ref 11.7–15.7)
IMM GRANULOCYTES # BLD: 0.4 10E9/L (ref 0–0.4)
IMM GRANULOCYTES # BLD: 0.6 10E9/L (ref 0–0.4)
IMM GRANULOCYTES NFR BLD: 1.7 %
IMM GRANULOCYTES NFR BLD: 2 %
LACTATE BLD-SCNC: 1.5 MMOL/L (ref 0.7–2)
LACTATE BLD-SCNC: 3.2 MMOL/L (ref 0.7–2)
LDH SERPL L TO P-CCNC: 713 U/L (ref 81–234)
LYMPHOCYTES # BLD AUTO: 1.7 10E9/L (ref 0.8–5.3)
LYMPHOCYTES # BLD AUTO: 2.1 10E9/L (ref 0.8–5.3)
LYMPHOCYTES NFR BLD AUTO: 5.4 %
LYMPHOCYTES NFR BLD AUTO: 8 %
MCH RBC QN AUTO: 29.3 PG (ref 26.5–33)
MCH RBC QN AUTO: 29.3 PG (ref 26.5–33)
MCH RBC QN AUTO: 30.1 PG (ref 26.5–33)
MCHC RBC AUTO-ENTMCNC: 31.8 G/DL (ref 31.5–36.5)
MCHC RBC AUTO-ENTMCNC: 31.9 G/DL (ref 31.5–36.5)
MCHC RBC AUTO-ENTMCNC: 32.3 G/DL (ref 31.5–36.5)
MCV RBC AUTO: 92 FL (ref 78–100)
MCV RBC AUTO: 92 FL (ref 78–100)
MCV RBC AUTO: 93 FL (ref 78–100)
MONOCYTES # BLD AUTO: 0.9 10E9/L (ref 0–1.3)
MONOCYTES # BLD AUTO: 1.4 10E9/L (ref 0–1.3)
MONOCYTES NFR BLD AUTO: 3.3 %
MONOCYTES NFR BLD AUTO: 4.5 %
NEUTROPHILS # BLD AUTO: 22.6 10E9/L (ref 1.6–8.3)
NEUTROPHILS # BLD AUTO: 27.6 10E9/L (ref 1.6–8.3)
NEUTROPHILS NFR BLD AUTO: 86.8 %
NEUTROPHILS NFR BLD AUTO: 87.9 %
NRBC # BLD AUTO: 0 10*3/UL
NRBC # BLD AUTO: 0 10*3/UL
NRBC BLD AUTO-RTO: 0 /100
NRBC BLD AUTO-RTO: 0 /100
PLATELET # BLD AUTO: 158 10E9/L (ref 150–450)
PLATELET # BLD AUTO: 64 10E9/L (ref 150–450)
PLATELET # BLD AUTO: 75 10E9/L (ref 150–450)
POTASSIUM SERPL-SCNC: 4.7 MMOL/L (ref 3.4–5.3)
PROT SERPL-MCNC: 5.5 G/DL (ref 6.8–8.8)
RBC # BLD AUTO: 3.51 10E12/L (ref 3.8–5.2)
RBC # BLD AUTO: 3.66 10E12/L (ref 3.8–5.2)
RBC # BLD AUTO: 4.17 10E12/L (ref 3.8–5.2)
RETICS # AUTO: 117.6 10E9/L (ref 25–95)
RETICS/RBC NFR AUTO: 3.5 % (ref 0.5–2)
SODIUM SERPL-SCNC: 135 MMOL/L (ref 133–144)
WBC # BLD AUTO: 23.3 10E9/L (ref 4–11)
WBC # BLD AUTO: 26 10E9/L (ref 4–11)
WBC # BLD AUTO: 31.4 10E9/L (ref 4–11)

## 2020-05-04 PROCEDURE — 25000128 H RX IP 250 OP 636: Performed by: ANESTHESIOLOGY

## 2020-05-04 PROCEDURE — 36000058 ZZH SURGERY LEVEL 3 EA 15 ADDTL MIN: Performed by: OBSTETRICS & GYNECOLOGY

## 2020-05-04 PROCEDURE — 88307 TISSUE EXAM BY PATHOLOGIST: CPT | Performed by: OBSTETRICS & GYNECOLOGY

## 2020-05-04 PROCEDURE — 25000128 H RX IP 250 OP 636: Performed by: FAMILY MEDICINE

## 2020-05-04 PROCEDURE — 25000125 ZZHC RX 250: Performed by: NURSE ANESTHETIST, CERTIFIED REGISTERED

## 2020-05-04 PROCEDURE — 25000132 ZZH RX MED GY IP 250 OP 250 PS 637: Performed by: OBSTETRICS & GYNECOLOGY

## 2020-05-04 PROCEDURE — 80053 COMPREHEN METABOLIC PANEL: CPT | Performed by: INTERNAL MEDICINE

## 2020-05-04 PROCEDURE — 85379 FIBRIN DEGRADATION QUANT: CPT | Performed by: INTERNAL MEDICINE

## 2020-05-04 PROCEDURE — 25000128 H RX IP 250 OP 636: Performed by: OBSTETRICS & GYNECOLOGY

## 2020-05-04 PROCEDURE — 84460 ALANINE AMINO (ALT) (SGPT): CPT | Performed by: FAMILY MEDICINE

## 2020-05-04 PROCEDURE — 99222 1ST HOSP IP/OBS MODERATE 55: CPT | Performed by: INTERNAL MEDICINE

## 2020-05-04 PROCEDURE — 36415 COLL VENOUS BLD VENIPUNCTURE: CPT | Performed by: FAMILY MEDICINE

## 2020-05-04 PROCEDURE — 99207 ZZC CONSULT E&M CHANGED TO INITIAL LEVEL: CPT | Performed by: INTERNAL MEDICINE

## 2020-05-04 PROCEDURE — 25000125 ZZHC RX 250: Performed by: FAMILY MEDICINE

## 2020-05-04 PROCEDURE — C1765 ADHESION BARRIER: HCPCS | Performed by: OBSTETRICS & GYNECOLOGY

## 2020-05-04 PROCEDURE — 36415 COLL VENOUS BLD VENIPUNCTURE: CPT | Performed by: OBSTETRICS & GYNECOLOGY

## 2020-05-04 PROCEDURE — 59510 CESAREAN DELIVERY: CPT | Performed by: OBSTETRICS & GYNECOLOGY

## 2020-05-04 PROCEDURE — 85384 FIBRINOGEN ACTIVITY: CPT | Performed by: INTERNAL MEDICINE

## 2020-05-04 PROCEDURE — 85027 COMPLETE CBC AUTOMATED: CPT | Performed by: FAMILY MEDICINE

## 2020-05-04 PROCEDURE — 83605 ASSAY OF LACTIC ACID: CPT | Performed by: INTERNAL MEDICINE

## 2020-05-04 PROCEDURE — 36000056 ZZH SURGERY LEVEL 3 1ST 30 MIN: Performed by: OBSTETRICS & GYNECOLOGY

## 2020-05-04 PROCEDURE — 37000009 ZZH ANESTHESIA TECHNICAL FEE, EACH ADDTL 15 MIN: Performed by: OBSTETRICS & GYNECOLOGY

## 2020-05-04 PROCEDURE — 00000146 ZZHCL STATISTIC GLUCOSE BY METER IP

## 2020-05-04 PROCEDURE — 85025 COMPLETE CBC W/AUTO DIFF WBC: CPT | Performed by: OBSTETRICS & GYNECOLOGY

## 2020-05-04 PROCEDURE — 83605 ASSAY OF LACTIC ACID: CPT | Performed by: OBSTETRICS & GYNECOLOGY

## 2020-05-04 PROCEDURE — 27210794 ZZH OR GENERAL SUPPLY STERILE: Performed by: OBSTETRICS & GYNECOLOGY

## 2020-05-04 PROCEDURE — 12000000 ZZH R&B MED SURG/OB

## 2020-05-04 PROCEDURE — 25000125 ZZHC RX 250: Performed by: OBSTETRICS & GYNECOLOGY

## 2020-05-04 PROCEDURE — 83010 ASSAY OF HAPTOGLOBIN QUANT: CPT | Performed by: INTERNAL MEDICINE

## 2020-05-04 PROCEDURE — 25000132 ZZH RX MED GY IP 250 OP 250 PS 637: Performed by: FAMILY MEDICINE

## 2020-05-04 PROCEDURE — 36415 COLL VENOUS BLD VENIPUNCTURE: CPT | Performed by: INTERNAL MEDICINE

## 2020-05-04 PROCEDURE — 71000015 ZZH RECOVERY PHASE 1 LEVEL 2 EA ADDTL HR: Performed by: OBSTETRICS & GYNECOLOGY

## 2020-05-04 PROCEDURE — 40000847 ZZHCL STATISTIC MORPHOLOGY W/INTERP HISTOLOGY TC 85060: Performed by: INTERNAL MEDICINE

## 2020-05-04 PROCEDURE — 25800030 ZZH RX IP 258 OP 636: Performed by: OBSTETRICS & GYNECOLOGY

## 2020-05-04 PROCEDURE — 85045 AUTOMATED RETICULOCYTE COUNT: CPT | Performed by: INTERNAL MEDICINE

## 2020-05-04 PROCEDURE — 87040 BLOOD CULTURE FOR BACTERIA: CPT | Performed by: OBSTETRICS & GYNECOLOGY

## 2020-05-04 PROCEDURE — 82565 ASSAY OF CREATININE: CPT | Performed by: FAMILY MEDICINE

## 2020-05-04 PROCEDURE — 88307 TISSUE EXAM BY PATHOLOGIST: CPT | Mod: 26 | Performed by: OBSTETRICS & GYNECOLOGY

## 2020-05-04 PROCEDURE — 37000008 ZZH ANESTHESIA TECHNICAL FEE, 1ST 30 MIN: Performed by: OBSTETRICS & GYNECOLOGY

## 2020-05-04 PROCEDURE — 85060 BLOOD SMEAR INTERPRETATION: CPT | Performed by: INTERNAL MEDICINE

## 2020-05-04 PROCEDURE — 71000014 ZZH RECOVERY PHASE 1 LEVEL 2 FIRST HR: Performed by: OBSTETRICS & GYNECOLOGY

## 2020-05-04 PROCEDURE — 83615 LACTATE (LD) (LDH) ENZYME: CPT | Performed by: INTERNAL MEDICINE

## 2020-05-04 PROCEDURE — 84450 TRANSFERASE (AST) (SGOT): CPT | Performed by: FAMILY MEDICINE

## 2020-05-04 PROCEDURE — 25000128 H RX IP 250 OP 636: Performed by: NURSE ANESTHETIST, CERTIFIED REGISTERED

## 2020-05-04 RX ORDER — MORPHINE SULFATE 1 MG/ML
INJECTION, SOLUTION EPIDURAL; INTRATHECAL; INTRAVENOUS PRN
Status: DISCONTINUED | OUTPATIENT
Start: 2020-05-04 | End: 2020-05-04

## 2020-05-04 RX ORDER — SODIUM CHLORIDE, SODIUM LACTATE, POTASSIUM CHLORIDE, CALCIUM CHLORIDE 600; 310; 30; 20 MG/100ML; MG/100ML; MG/100ML; MG/100ML
INJECTION, SOLUTION INTRAVENOUS CONTINUOUS
Status: DISCONTINUED | OUTPATIENT
Start: 2020-05-04 | End: 2020-05-04 | Stop reason: HOSPADM

## 2020-05-04 RX ORDER — OXYTOCIN 10 [USP'U]/ML
INJECTION, SOLUTION INTRAMUSCULAR; INTRAVENOUS PRN
Status: DISCONTINUED | OUTPATIENT
Start: 2020-05-04 | End: 2020-05-06

## 2020-05-04 RX ORDER — SIMETHICONE 80 MG
80 TABLET,CHEWABLE ORAL 4 TIMES DAILY PRN
Status: DISCONTINUED | OUTPATIENT
Start: 2020-05-04 | End: 2020-05-07 | Stop reason: HOSPADM

## 2020-05-04 RX ORDER — OXYTOCIN/0.9 % SODIUM CHLORIDE 30/500 ML
100 PLASTIC BAG, INJECTION (ML) INTRAVENOUS CONTINUOUS
Status: DISCONTINUED | OUTPATIENT
Start: 2020-05-04 | End: 2020-05-07 | Stop reason: HOSPADM

## 2020-05-04 RX ORDER — MODIFIED LANOLIN
OINTMENT (GRAM) TOPICAL
Status: DISCONTINUED | OUTPATIENT
Start: 2020-05-04 | End: 2020-05-07 | Stop reason: HOSPADM

## 2020-05-04 RX ORDER — AMOXICILLIN 250 MG
2 CAPSULE ORAL 2 TIMES DAILY
Status: DISCONTINUED | OUTPATIENT
Start: 2020-05-04 | End: 2020-05-07 | Stop reason: HOSPADM

## 2020-05-04 RX ORDER — OXYTOCIN/0.9 % SODIUM CHLORIDE 30/500 ML
340 PLASTIC BAG, INJECTION (ML) INTRAVENOUS CONTINUOUS PRN
Status: DISCONTINUED | OUTPATIENT
Start: 2020-05-04 | End: 2020-05-07 | Stop reason: HOSPADM

## 2020-05-04 RX ORDER — FENTANYL CITRATE 50 UG/ML
INJECTION, SOLUTION INTRAMUSCULAR; INTRAVENOUS PRN
Status: DISCONTINUED | OUTPATIENT
Start: 2020-05-04 | End: 2020-05-04

## 2020-05-04 RX ORDER — CLINDAMYCIN PHOSPHATE 900 MG/50ML
900 INJECTION, SOLUTION INTRAVENOUS
Status: COMPLETED | OUTPATIENT
Start: 2020-05-04 | End: 2020-05-04

## 2020-05-04 RX ORDER — NALOXONE HYDROCHLORIDE 0.4 MG/ML
.1-.4 INJECTION, SOLUTION INTRAMUSCULAR; INTRAVENOUS; SUBCUTANEOUS
Status: DISCONTINUED | OUTPATIENT
Start: 2020-05-04 | End: 2020-05-07 | Stop reason: HOSPADM

## 2020-05-04 RX ORDER — DEXAMETHASONE SODIUM PHOSPHATE 4 MG/ML
INJECTION, SOLUTION INTRA-ARTICULAR; INTRALESIONAL; INTRAMUSCULAR; INTRAVENOUS; SOFT TISSUE PRN
Status: DISCONTINUED | OUTPATIENT
Start: 2020-05-04 | End: 2020-05-04

## 2020-05-04 RX ORDER — LIDOCAINE HYDROCHLORIDE 10 MG/ML
INJECTION, SOLUTION EPIDURAL; INFILTRATION; INTRACAUDAL; PERINEURAL PRN
Status: DISCONTINUED | OUTPATIENT
Start: 2020-05-04 | End: 2020-05-06

## 2020-05-04 RX ORDER — OXYTOCIN/0.9 % SODIUM CHLORIDE 30/500 ML
PLASTIC BAG, INJECTION (ML) INTRAVENOUS PRN
Status: DISCONTINUED | OUTPATIENT
Start: 2020-05-04 | End: 2020-05-04

## 2020-05-04 RX ORDER — AMOXICILLIN 250 MG
1 CAPSULE ORAL 2 TIMES DAILY
Status: DISCONTINUED | OUTPATIENT
Start: 2020-05-04 | End: 2020-05-07 | Stop reason: HOSPADM

## 2020-05-04 RX ORDER — ACETAMINOPHEN 325 MG/1
975 TABLET ORAL EVERY 6 HOURS
Status: DISCONTINUED | OUTPATIENT
Start: 2020-05-04 | End: 2020-05-07 | Stop reason: HOSPADM

## 2020-05-04 RX ORDER — CITRIC ACID/SODIUM CITRATE 334-500MG
30 SOLUTION, ORAL ORAL
Status: COMPLETED | OUTPATIENT
Start: 2020-05-04 | End: 2020-05-04

## 2020-05-04 RX ORDER — KETOROLAC TROMETHAMINE 15 MG/ML
15 INJECTION, SOLUTION INTRAMUSCULAR; INTRAVENOUS EVERY 6 HOURS
Status: DISCONTINUED | OUTPATIENT
Start: 2020-05-04 | End: 2020-05-04

## 2020-05-04 RX ORDER — LABETALOL HYDROCHLORIDE 5 MG/ML
INJECTION, SOLUTION INTRAVENOUS PRN
Status: DISCONTINUED | OUTPATIENT
Start: 2020-05-04 | End: 2020-05-04

## 2020-05-04 RX ORDER — LABETALOL 20 MG/4 ML (5 MG/ML) INTRAVENOUS SYRINGE
40 EVERY 10 MIN PRN
Status: DISCONTINUED | OUTPATIENT
Start: 2020-05-03 | End: 2020-05-07 | Stop reason: HOSPADM

## 2020-05-04 RX ORDER — MAGNESIUM HYDROXIDE 1200 MG/15ML
LIQUID ORAL PRN
Status: DISCONTINUED | OUTPATIENT
Start: 2020-05-04 | End: 2020-05-06

## 2020-05-04 RX ORDER — IBUPROFEN 800 MG/1
800 TABLET, FILM COATED ORAL EVERY 6 HOURS
Status: DISCONTINUED | OUTPATIENT
Start: 2020-05-05 | End: 2020-05-06

## 2020-05-04 RX ORDER — LIDOCAINE 40 MG/G
CREAM TOPICAL
Status: DISCONTINUED | OUTPATIENT
Start: 2020-05-04 | End: 2020-05-07 | Stop reason: HOSPADM

## 2020-05-04 RX ORDER — MISOPROSTOL 200 UG/1
800 TABLET ORAL ONCE
Status: COMPLETED | OUTPATIENT
Start: 2020-05-04 | End: 2020-05-04

## 2020-05-04 RX ORDER — OXYTOCIN/0.9 % SODIUM CHLORIDE 30/500 ML
1-24 PLASTIC BAG, INJECTION (ML) INTRAVENOUS CONTINUOUS
Status: DISCONTINUED | OUTPATIENT
Start: 2020-05-04 | End: 2020-05-04

## 2020-05-04 RX ORDER — TRANEXAMIC ACID 10 MG/ML
1 INJECTION, SOLUTION INTRAVENOUS EVERY 30 MIN PRN
Status: DISCONTINUED | OUTPATIENT
Start: 2020-05-04 | End: 2020-05-07 | Stop reason: HOSPADM

## 2020-05-04 RX ORDER — CLINDAMYCIN PHOSPHATE 900 MG/50ML
900 INJECTION, SOLUTION INTRAVENOUS EVERY 8 HOURS
Status: DISCONTINUED | OUTPATIENT
Start: 2020-05-04 | End: 2020-05-05

## 2020-05-04 RX ORDER — DEXTROSE, SODIUM CHLORIDE, SODIUM LACTATE, POTASSIUM CHLORIDE, AND CALCIUM CHLORIDE 5; .6; .31; .03; .02 G/100ML; G/100ML; G/100ML; G/100ML; G/100ML
INJECTION, SOLUTION INTRAVENOUS CONTINUOUS
Status: DISCONTINUED | OUTPATIENT
Start: 2020-05-04 | End: 2020-05-07 | Stop reason: HOSPADM

## 2020-05-04 RX ORDER — FERROUS SULFATE 325(65) MG
325 TABLET ORAL 2 TIMES DAILY
Status: DISCONTINUED | OUTPATIENT
Start: 2020-05-04 | End: 2020-05-07 | Stop reason: HOSPADM

## 2020-05-04 RX ORDER — OXYTOCIN 10 [USP'U]/ML
10 INJECTION, SOLUTION INTRAMUSCULAR; INTRAVENOUS
Status: DISCONTINUED | OUTPATIENT
Start: 2020-05-04 | End: 2020-05-07 | Stop reason: HOSPADM

## 2020-05-04 RX ORDER — LIDOCAINE HYDROCHLORIDE 20 MG/ML
INJECTION, SOLUTION INFILTRATION; PERINEURAL PRN
Status: DISCONTINUED | OUTPATIENT
Start: 2020-05-04 | End: 2020-05-04

## 2020-05-04 RX ORDER — ONDANSETRON 2 MG/ML
4 INJECTION INTRAMUSCULAR; INTRAVENOUS EVERY 6 HOURS PRN
Status: DISCONTINUED | OUTPATIENT
Start: 2020-05-04 | End: 2020-05-07 | Stop reason: HOSPADM

## 2020-05-04 RX ORDER — PRENATAL VIT/IRON FUM/FOLIC AC 27MG-0.8MG
1 TABLET ORAL DAILY
Status: DISCONTINUED | OUTPATIENT
Start: 2020-05-04 | End: 2020-05-07 | Stop reason: HOSPADM

## 2020-05-04 RX ORDER — LABETALOL 20 MG/4 ML (5 MG/ML) INTRAVENOUS SYRINGE
10
Status: DISCONTINUED | OUTPATIENT
Start: 2020-05-04 | End: 2020-05-07 | Stop reason: HOSPADM

## 2020-05-04 RX ORDER — HYDROCORTISONE 2.5 %
CREAM (GRAM) TOPICAL 3 TIMES DAILY PRN
Status: DISCONTINUED | OUTPATIENT
Start: 2020-05-04 | End: 2020-05-07 | Stop reason: HOSPADM

## 2020-05-04 RX ORDER — LABETALOL 20 MG/4 ML (5 MG/ML) INTRAVENOUS SYRINGE
20 EVERY 10 MIN PRN
Status: DISCONTINUED | OUTPATIENT
Start: 2020-05-03 | End: 2020-05-07 | Stop reason: HOSPADM

## 2020-05-04 RX ORDER — LABETALOL 20 MG/4 ML (5 MG/ML) INTRAVENOUS SYRINGE
80 EVERY 10 MIN PRN
Status: DISCONTINUED | OUTPATIENT
Start: 2020-05-03 | End: 2020-05-07 | Stop reason: HOSPADM

## 2020-05-04 RX ORDER — BISACODYL 10 MG
10 SUPPOSITORY, RECTAL RECTAL DAILY PRN
Status: DISCONTINUED | OUTPATIENT
Start: 2020-05-06 | End: 2020-05-07 | Stop reason: HOSPADM

## 2020-05-04 RX ORDER — OXYCODONE HYDROCHLORIDE 5 MG/1
5 TABLET ORAL EVERY 4 HOURS PRN
Status: DISCONTINUED | OUTPATIENT
Start: 2020-05-04 | End: 2020-05-05

## 2020-05-04 RX ADMIN — LABETALOL HYDROCHLORIDE 5 MG: 5 INJECTION, SOLUTION INTRAVENOUS at 03:05

## 2020-05-04 RX ADMIN — LIDOCAINE HYDROCHLORIDE 7 ML: 20 INJECTION, SOLUTION INFILTRATION; PERINEURAL at 02:02

## 2020-05-04 RX ADMIN — PRENATAL VITAMINS-IRON FUMARATE 27 MG IRON-FOLIC ACID 0.8 MG TABLET 1 TABLET: at 08:37

## 2020-05-04 RX ADMIN — KETOROLAC TROMETHAMINE 15 MG: 15 INJECTION, SOLUTION INTRAMUSCULAR; INTRAVENOUS at 09:41

## 2020-05-04 RX ADMIN — SODIUM CITRATE AND CITRIC ACID MONOHYDRATE 30 ML: 500; 334 SOLUTION ORAL at 01:57

## 2020-05-04 RX ADMIN — SENNOSIDES AND DOCUSATE SODIUM 1 TABLET: 8.6; 5 TABLET ORAL at 20:25

## 2020-05-04 RX ADMIN — LABETALOL 20 MG/4 ML (5 MG/ML) INTRAVENOUS SYRINGE 40 MG: at 05:10

## 2020-05-04 RX ADMIN — LABETALOL HYDROCHLORIDE 5 MG: 5 INJECTION, SOLUTION INTRAVENOUS at 03:04

## 2020-05-04 RX ADMIN — SODIUM CHLORIDE, POTASSIUM CHLORIDE, SODIUM LACTATE AND CALCIUM CHLORIDE 100 ML/HR: 600; 310; 30; 20 INJECTION, SOLUTION INTRAVENOUS at 20:22

## 2020-05-04 RX ADMIN — CARBOPROST TROMETHAMINE 250 MCG: 250 INJECTION, SOLUTION INTRAMUSCULAR at 02:27

## 2020-05-04 RX ADMIN — LIDOCAINE HYDROCHLORIDE 5 ML: 20 INJECTION, SOLUTION INFILTRATION; PERINEURAL at 02:14

## 2020-05-04 RX ADMIN — MAGNESIUM SULFATE IN WATER 2 G/HR: 40 INJECTION, SOLUTION INTRAVENOUS at 00:48

## 2020-05-04 RX ADMIN — KETOROLAC TROMETHAMINE 15 MG: 15 INJECTION, SOLUTION INTRAMUSCULAR; INTRAVENOUS at 16:33

## 2020-05-04 RX ADMIN — CLINDAMYCIN PHOSPHATE 900 MG: 900 INJECTION, SOLUTION INTRAVENOUS at 16:37

## 2020-05-04 RX ADMIN — TRANEXAMIC ACID 1 G: 10 INJECTION, SOLUTION INTRAVENOUS at 02:25

## 2020-05-04 RX ADMIN — GENTAMICIN SULFATE 100 MG: 40 INJECTION, SOLUTION INTRAMUSCULAR; INTRAVENOUS at 22:54

## 2020-05-04 RX ADMIN — CLINDAMYCIN PHOSPHATE 900 MG: 900 INJECTION, SOLUTION INTRAVENOUS at 02:02

## 2020-05-04 RX ADMIN — FENTANYL CITRATE 50 MCG: 50 INJECTION, SOLUTION INTRAMUSCULAR; INTRAVENOUS at 02:41

## 2020-05-04 RX ADMIN — FENTANYL CITRATE 50 MCG: 50 INJECTION, SOLUTION INTRAMUSCULAR; INTRAVENOUS at 03:04

## 2020-05-04 RX ADMIN — FERROUS SULFATE TAB 325 MG (65 MG ELEMENTAL FE) 325 MG: 325 (65 FE) TAB at 08:37

## 2020-05-04 RX ADMIN — ACETAMINOPHEN 975 MG: 325 TABLET, FILM COATED ORAL at 08:36

## 2020-05-04 RX ADMIN — MISOPROSTOL 800 MCG: 200 TABLET ORAL at 03:21

## 2020-05-04 RX ADMIN — ONDANSETRON 4 MG: 2 INJECTION INTRAMUSCULAR; INTRAVENOUS at 02:04

## 2020-05-04 RX ADMIN — NIFEDIPINE 10 MG: 10 CAPSULE ORAL at 00:13

## 2020-05-04 RX ADMIN — MORPHINE SULFATE 3 MG: 1 INJECTION EPIDURAL; INTRATHECAL; INTRAVENOUS at 02:17

## 2020-05-04 RX ADMIN — ACETAMINOPHEN 975 MG: 325 TABLET, FILM COATED ORAL at 20:24

## 2020-05-04 RX ADMIN — FENTANYL CITRATE 50 MCG: 50 INJECTION, SOLUTION INTRAMUSCULAR; INTRAVENOUS at 02:55

## 2020-05-04 RX ADMIN — MAGNESIUM SULFATE IN WATER 4 G: 40 INJECTION, SOLUTION INTRAVENOUS at 00:18

## 2020-05-04 RX ADMIN — OXYTOCIN-SODIUM CHLORIDE 0.9% IV SOLN 30 UNIT/500ML 1 ML: 30-0.9/5 SOLUTION at 02:22

## 2020-05-04 RX ADMIN — SODIUM CHLORIDE, POTASSIUM CHLORIDE, SODIUM LACTATE AND CALCIUM CHLORIDE 100 ML/HR: 600; 310; 30; 20 INJECTION, SOLUTION INTRAVENOUS at 09:19

## 2020-05-04 RX ADMIN — LIDOCAINE HYDROCHLORIDE 8 ML: 20 INJECTION, SOLUTION INFILTRATION; PERINEURAL at 01:47

## 2020-05-04 RX ADMIN — Medication 100 ML/HR: at 04:05

## 2020-05-04 RX ADMIN — Medication 1 MILLI-UNITS/MIN: at 01:07

## 2020-05-04 RX ADMIN — OXYTOCIN-SODIUM CHLORIDE 0.9% IV SOLN 30 UNIT/500ML 399 ML: 30-0.9/5 SOLUTION at 03:06

## 2020-05-04 RX ADMIN — GENTAMICIN SULFATE 120 MG: 40 INJECTION, SOLUTION INTRAMUSCULAR; INTRAVENOUS at 01:57

## 2020-05-04 RX ADMIN — SODIUM CHLORIDE, POTASSIUM CHLORIDE, SODIUM LACTATE AND CALCIUM CHLORIDE: 600; 310; 30; 20 INJECTION, SOLUTION INTRAVENOUS at 02:20

## 2020-05-04 RX ADMIN — MAGNESIUM SULFATE IN WATER 1 G/HR: 40 INJECTION, SOLUTION INTRAVENOUS at 19:52

## 2020-05-04 RX ADMIN — LABETALOL HYDROCHLORIDE 10 MG: 5 INJECTION, SOLUTION INTRAVENOUS at 03:11

## 2020-05-04 RX ADMIN — AZITHROMYCIN MONOHYDRATE 500 MG: 500 INJECTION, POWDER, LYOPHILIZED, FOR SOLUTION INTRAVENOUS at 02:09

## 2020-05-04 RX ADMIN — GENTAMICIN SULFATE 100 MG: 40 INJECTION, SOLUTION INTRAMUSCULAR; INTRAVENOUS at 15:29

## 2020-05-04 RX ADMIN — SODIUM CHLORIDE, POTASSIUM CHLORIDE, SODIUM LACTATE AND CALCIUM CHLORIDE: 600; 310; 30; 20 INJECTION, SOLUTION INTRAVENOUS at 01:57

## 2020-05-04 RX ADMIN — LABETALOL 20 MG/4 ML (5 MG/ML) INTRAVENOUS SYRINGE 20 MG: at 03:49

## 2020-05-04 RX ADMIN — DEXAMETHASONE SODIUM PHOSPHATE 4 MG: 4 INJECTION, SOLUTION INTRA-ARTICULAR; INTRALESIONAL; INTRAMUSCULAR; INTRAVENOUS; SOFT TISSUE at 02:04

## 2020-05-04 RX ADMIN — FERROUS SULFATE TAB 325 MG (65 MG ELEMENTAL FE) 325 MG: 325 (65 FE) TAB at 20:25

## 2020-05-04 RX ADMIN — ACETAMINOPHEN 975 MG: 325 TABLET, FILM COATED ORAL at 14:38

## 2020-05-04 NOTE — BRIEF OP NOTE
[facility]   Brief Op Note    Patient Name:Susan Rubalcava  MRN: [id number]  YOB: 1985  Surgery Date: 2020    Surgeon: Tom Molina MD    Pre-operative Diagnosis: 1) Intrauterine pregnancy at 41w2d 2) Arrest of decent 3) Pre-eclampsia with severe features    Post-operative Diagnosis: 1) Viable male infant, delivered   Procedure: Primary low transverse  section via Pfannenstiel skin incision with two layer uterine closure    Anesthesia: Epidural     EBL: 711 mL   IV fluids: 1400 mL crystalloid  Urine Output: 550 mL of clear orange tinged urine at the end of the procedure    Complications: None  Specimen: Placenta   Drains: Ibarra catheter    Findings: Viable male infant delivered in cephalic presentation at 0221 on 2020. LOP position and significant caput. No nuchal cord. Thick meconium fluid noted at hysterotomy. Weight 8 lbs 4 oz. Apgar 8 and 9 at 1 and 5 minutes, respectively. Normal appearing uterus, bilateral fallopian tubes and ovaries. Uterine atony noted and resolved with standard IV Pitocin 30 units in 500 mL saline bag infused in addition to Hemabate  mcg, 10 units of intrauterine pitocin administered at the uterine fundus, misoprostol 800 mcg per rectum and tranexamic acid for prophylaxis against hemorrhage from uterine atony. No intraperitoneal adhesions.     Technique: To follow with full operative note    Tom Molina MD  [facility]

## 2020-05-04 NOTE — LACTATION NOTE
This note was copied from a baby's chart.  LC visit.  Infant had not successfully latched until LC worked with couplet.  Good latch obtained on the left after a feeding attempt on the left, mouth exercises, and stimulation.  Shortly after infant latched and moved into a nutritive pattern, Susan began experiencing rigors.  RN updated, warm blanket applied to her shoulders, and FOB encouraged to monitor for if infant should need to be taken off the latch.

## 2020-05-04 NOTE — PROGRESS NOTES
I was notified the patient's complaint of shaking chills.  She had prolonged rupture membranes with an arrest of labor and a  section at 41-2/7 weeks and a pregnancy complicated with preeclampsia and severe features.  She remains afebrile but did notice that shaking chills.  My concern is an endometritis rule out sepsis.  I checked a CBC with differential With the following results      WBC  4.0 - 11.0 10e9/L  31.4High         RBC Count  3.8 - 5.2 10e12/L  3.66Low         Hemoglobin  11.7 - 15.7 g/dL  11.0Low         Hematocrit  35.0 - 47.0 %  34.1Low         MCV  78 - 100 fl  93       MCH  26.5 - 33.0 pg  30.1       MCHC  31.5 - 36.5 g/dL  32.3       RDW  10.0 - 15.0 %  15.4High         Platelet Count  150 - 450 10e9/L  75Low         Diff Method   Automated Method       % Neutrophils  %  87.9       % Lymphocytes  %  5.4       % Monocytes  %  4.5       % Eosinophils  %  0.0       % Basophils  %  0.2       % Immature Granulocytes  %  2.0       Nucleated RBCs  0 /100  0       Absolute Neutrophil  1.6 - 8.3 10e9/L  27.6High         Absolute Lymphocytes  0.8 - 5.3 10e9/L  1.7       Absolute Monocytes  0.0 - 1.3 10e9/L  1.4High         Absolute Eosinophils  0.0 - 0.7 10e9/L  0.0       Absolute Basophils  0.0 - 0.2 10e9/L  0.1       Abs Immature Granulocytes  0 - 0.4 10e9/L  0.6High         Absolute Nucleated RBC   0.0      And earlier CBC done at 12:51 AM this morning showed the following     WBC  4.0 - 11.0 10e9/L  23.3High          RBC Count  3.8 - 5.2 10e12/L  4.17        Hemoglobin  11.7 - 15.7 g/dL  12.2   11.6Low         Hematocrit  35.0 - 47.0 %  38.4        MCV  78 - 100 fl  92        MCH  26.5 - 33.0 pg  29.3        MCHC  31.5 - 36.5 g/dL  31.8        RDW  10.0 - 15.0 %  15.1High          Platelet Count  150 - 450 10e9/L  158           A: Shaking chills in a patient is otherwise afebrile with leukocytosis thrombocytopenia with a left shift in her differential with  a penicillin allergy    P: will begin  clindamycin and gentamicin IV.  We will get blood cultures and a lactic acid and monitor her very closely with appropriate therapy to follow

## 2020-05-04 NOTE — ANESTHESIA PREPROCEDURE EVALUATION
Anesthesia Pre-Procedure Evaluation    Patient: Susan Rubalcava   MRN: [id number] : 1985          Preoperative Diagnosis: * No pre-op diagnosis entered *    Procedure(s):   SECTION    Past Medical History:   Diagnosis Date     ASCUS with positive high risk HPV     + HPV 16 colp - CANDIE I     Cervical high risk HPV (human papillomavirus) test positive , ,,,16,-     Contact dermatitis and other eczema, due to unspecified cause      LSIL (low grade squamous intraepithelial lesion) on Pap smear , 10/13, ,     +high risk HPV, CANDIE 1 on pathology x 3     Mild dysplasia of cervix 2019    repeat pap with HPV in one year     Rheumatism, unspecified and fibrositis     no repeat episodes since 2004     Rheumatoid arthritis of multiple sites with negative rheumatoid factor (H) 2004    Senior year of high school and first year of college and no symptoms since then.     Past Surgical History:   Procedure Laterality Date     COLPOSCOPY  2019    mild dysplasia with L Mcmanus - repeat pap with HPV in one year     Anesthesia Evaluation     .             ROS/MED HX    ENT/Pulmonary:  - neg pulmonary ROS    (-) asthma   Neurologic:  - neg neurologic ROS    (-) Other neuro hx and Neuropathy   Cardiovascular:     (+) hypertension-range: pre eclampsia, ---. : . . . :. .       METS/Exercise Tolerance:     Hematologic:  - neg hematologic  ROS      (-) anemia   Musculoskeletal:   (+)  other musculoskeletal (RA)-       GI/Hepatic:  - neg GI/hepatic ROS       Renal/Genitourinary:  - ROS Renal section negative       Endo:  - neg endo ROS       Psychiatric:         Infectious Disease:  - neg infectious disease ROS       Malignancy:         Other:                       (+) pre-eclampsia          Physical Exam  Normal systems: cardiovascular, pulmonary and dental    Airway   Mallampati: II  TM distance: >3 FB  Neck ROM: full    Dental     Cardiovascular       Pulmonary  "            Lab Results   Component Value Date    WBC 23.3 (H) 05/04/2020    HGB 12.2 05/04/2020    HCT 38.4 05/04/2020     05/04/2020    CRP 1.5 06/22/2007     10/28/2019    POTASSIUM 3.4 10/28/2019    CHLORIDE 103 10/28/2019    CO2 24 10/28/2019    BUN 4 (L) 10/28/2019    CR 1.01 05/04/2020     (H) 10/28/2019    AGNIESZKA 9.2 10/28/2019    ALBUMIN 4.4 06/11/2010    PROTTOTAL 7.7 06/11/2010     (H) 05/04/2020     (H) 05/04/2020    ALKPHOS 39 (L) 07/26/2010    BILITOTAL 0.5 07/26/2010    TSH 1.54 09/23/2005    HCG Negative 06/04/2019    HCGS Positive (A) 08/17/2019       Preop Vitals  BP Readings from Last 3 Encounters:   05/04/20 (!) 138/90   04/27/20 121/89   04/20/20 120/84    Pulse Readings from Last 3 Encounters:   05/03/20 63   04/27/20 85   04/20/20 72      Resp Readings from Last 3 Encounters:   05/04/20 16   04/27/20 16   04/20/20 14    SpO2 Readings from Last 3 Encounters:   04/27/20 98%   04/13/20 97%   04/06/20 98%      Temp Readings from Last 1 Encounters:   05/04/20 98.4  F (36.9  C) (Oral)    Ht Readings from Last 1 Encounters:   05/02/20 1.676 m (5' 6\")      Wt Readings from Last 1 Encounters:   05/02/20 82.6 kg (182 lb)    Estimated body mass index is 29.38 kg/m  as calculated from the following:    Height as of this encounter: 1.676 m (5' 6\").    Weight as of this encounter: 82.6 kg (182 lb).       Anesthesia Plan      History & Physical Review  History and physical reviewed and following examination; no interval change.    ASA Status:  2 emergent.    NPO Status:  Waived due to emergency    Plan for Epidural (GA backup PRN)   PONV prophylaxis:  Ondansetron (or other 5HT-3) and Dexamethasone or Solumedrol  Working epidural in place, will use epidural for primary anesthetic.  GA backup PRN.      Patient is on magnesium for pre eclampsia         Postoperative Care  Postoperative pain management:  Neuraxial analgesia and IV analgesics.      Consents  Anesthetic plan, risks, " benefits and alternatives discussed with:  Patient..                 Mike Joseph MD                    .

## 2020-05-04 NOTE — PROGRESS NOTES
"SUBJECTIVE:  Susan Rubalcava is a 34 year old  at 41+1 weeks in active labor.   She has been pushing over an hour and making progress.   She has had 2 blood pressure 15 min apart in severe range.   She is not having abdominal pain or headache.       Objective:   Blood pressure (!) 189/84, pulse 63, temperature 97.9  F (36.6  C), temperature source Oral, resp. rate 16, height 1.676 m (5' 6\"), weight 82.6 kg (182 lb), last menstrual period 2019, not currently breastfeeding.  Last blood pressure 168/60's  [place]: every 3-5 minutes  EFM: 140's with moderate variability and accels with contractions  Lie: cephalic - suspect MALVIN  Ext: 1+ ankle edema    Assessment:  1. 34 year old prime postdates with moderate mec staining now pushing with severe hypertension which is new    Plan:  1. HTN orders  2. HTN labs  3. Will start magnesium  4. Nifedipine for severe hypertension   5. Patient will continue pushing      "

## 2020-05-04 NOTE — PROVIDER NOTIFICATION
05/03/20 2116   Provider Notification   Provider Name/Title Dr. Madison Hamlin   Method of Notification Electronic Page   Request Evaluate - Remote   Notification Reason Status Update       Md notified that primary RN started pushing with pt at 2100. Baby at +1/+2 station. Dr. Madison Hamlin will be coming to the hospital to attend delivery.    Ally Cifuentes RN on 5/3/2020 at 9:27 PM

## 2020-05-04 NOTE — PROVIDER NOTIFICATION
05/03/20 1932 05/03/20 1947   Provider Notification   Provider Name/Title Dr. Madison Turner   Method of Notification Electronic Page Phone   Request Evaluate - Remote Evaluate - Remote   Notification Reason Status Update Status Update     Update MD that SVE 9-10/80%/1 with a lip on one side, having pt labor down. Plane to re-check cervix at 2030/2100. Pt comfortable with epidural. Elevated Bps 150s/80-90s, pt was 140s/80-90s last night.     Will update MD after next SVE.

## 2020-05-04 NOTE — ANESTHESIA POSTPROCEDURE EVALUATION
Patient: Susan Rubalcava    Procedure(s):   SECTION    Diagnosis:* No pre-op diagnosis entered *  Diagnosis Additional Information: No value filed.    Anesthesia Type:  No value filed.    Note:  Anesthesia Post Evaluation    Patient location during evaluation: PACU  Patient participation: Able to participate in evaluation but full recovery from regional anesthesia has not yet ocurrred but is anticipated to occur within 48 hours  Level of consciousness: awake and alert  Pain management: adequate  Airway patency: patent  Cardiovascular status: acceptable  Respiratory status: acceptable  Hydration status: acceptable  PONV: controlled             Last vitals:  Vitals:    20 0111 20 0122 20 0325   BP:  (!) 138/90 (!) 163/100   Pulse:      Resp: 16     Temp: 98.4  F (36.9  C)     SpO2:   98%         Electronically Signed By: Mike Joseph MD  May 4, 2020  3:28 AM

## 2020-05-04 NOTE — PRE-PROCEDURE
Pre-op H and P:  SUBJECTIVE:   Susan Rubalcava is a 34 year old  at 41+2 weeks here for medical induction for postdates.   She had cytotec times 4 doses orally followed by AROM yesterday am around 0830.   She had light mec staining.   Her labor has progressed and she began pushing at 9 pm.   She has great effort with pushing.    During the last hour her blood pressure became elevated.   She had 168/62 and then 189/84 right before midnight.   Orders were placed for PIH labs and magnesium and nifedipine was used for blood pressure.   Her blood pressure is now adequate.   Her labs are pending.   Magnesium load has been given.   There has been no progress in the descent of the fetal head.    She is A+, GBS NEG, immune to rubella, had her tdap and her HGB was 11.6 on admit.     OB History    Para Term  AB Living   1 0 0 0 0 0   SAB TAB Ectopic Multiple Live Births   0 0 0 0 0      # Outcome Date GA Lbr Jian/2nd Weight Sex Delivery Anes PTL Lv   1 Current              Past Medical History:   Diagnosis Date     ASCUS with positive high risk HPV     + HPV 16 colp - CANDIE I     Cervical high risk HPV (human papillomavirus) test positive , ,',,'16,'17-'     Contact dermatitis and other eczema, due to unspecified cause      LSIL (low grade squamous intraepithelial lesion) on Pap smear , 10/13, ,     +high risk HPV, CANDIE 1 on pathology x 3     Mild dysplasia of cervix 2019    repeat pap with HPV in one year     Rheumatism, unspecified and fibrositis     no repeat episodes since 2004     Rheumatoid arthritis of multiple sites with negative rheumatoid factor (H) 2004    Senior year of high school and first year of college and no symptoms since then.       Past Surgical History:   Procedure Laterality Date     COLPOSCOPY  2019    mild dysplasia with L Mcmanus - repeat pap with HPV in one year       MEDICATIONS:  Current Facility-Administered Medications   Medication  "    acetaminophen (TYLENOL) tablet 650 mg     calcium carbonate (TUMS) chewable tablet 1,000 mg     calcium gluconate 10 % injection 1 g     carboprost (HEMABATE) injection 250 mcg     fentaNYL (PF) (SUBLIMAZE) injection  mcg     fentaNYL (SUBLIMAZE) 2 mcg/mL, bupivacaine (MARCAINE) 0.125% in NS premix for PCEA     ibuprofen (ADVIL/MOTRIN) tablet 800 mg     IF subcutaneous (SQ) Unfractionated heparin (UFH) ordered for thromboprophylaxis    Or     IF intravenous (IV) Unfractionated heparin (UFH) ordered    Or     IF LOW-dose Low molecular weight heparin (LMWH) thromboprophylaxis ordered    Or     IF HIGHER-dose Low molecular weight heparin (LMWH) thromboprophylaxis ordered     labetalol (NORMODYNE/TRANDATE) algorithm-medication instruction     labetalol (NORMODYNE/TRANDATE) syringe 20 mg     labetalol (NORMODYNE/TRANDATE) syringe 40 mg     labetalol (NORMODYNE/TRANDATE) syringe 80 mg     lactated ringers BOLUS 250 mL     lactated ringers infusion     lactated ringers infusion     lidocaine 1 % 0.1-20 mL     lidocaine-EPINEPHrine 1.5 %-[id number] injection 3 mL     LORazepam (ATIVAN) injection 2 mg     magnesium sulfate 2 g in water intermittent infusion     magnesium sulfate 4 g in 100 mL sterile water (premade)     magnesium sulfate infusion     magnesium sulfate injection 4 g     medication instruction     Medication Instructions - cervical ripening and induction medications     Medication Instructions - cervical ripening and induction medications     Medication Instructions: misoprostol (CYTOTEC)- Nurse to discuss ordering with provider, if needed. Ordered via \"OB misoprostol (CYTOTEC) Postpartum Hemorrhage PANEL\"     methylergonovine (METHERGINE) injection 200 mcg     misoprostol (cervical ripening) (CYTOTEC) quarter-tab 25 mcg     nalbuphine (NUBAIN) injection 2.5-5 mg     naloxone (NARCAN) injection 0.1-0.4 mg     NIFEdipine (PROCARDIA) capsule 10 mg     ondansetron (ZOFRAN-ODT) ODT tab 4 mg    Or     " "ondansetron (ZOFRAN) injection 4 mg     Opioid plan postpartum - medication instruction     oxyCODONE-acetaminophen (PERCOCET) 5-325 MG per tablet 1 tablet     oxytocin (PITOCIN) 30 units in 500 mL 0.9% NaCl infusion     oxytocin (PITOCIN) 30 units in 500 mL 0.9% NaCl infusion     oxytocin (PITOCIN) injection 10 Units     phenylephrine (ANTONIO-SYNEPHRINE) injection 100 mcg     sodium chloride (PF) 0.9% PF flush 3 mL     tranexamic acid 1 g in 100 mL 0.7% NaCl IV bag (premix)       SOCIAL HISTORY:  Social History     Tobacco Use     Smoking status: Never Smoker     Smokeless tobacco: Never Used   Substance Use Topics     Alcohol use: Not Currently       Family History   Problem Relation Age of Onset     Thyroid Disease Mother      Prostate Cancer Father      Prostate Problems Father 61        Prostate Cancer     Breast Cancer Maternal Grandmother      Diabetes Maternal Grandfather         in70's     Heart Disease Paternal Grandmother      Rheumatoid Arthritis Paternal Grandmother      Cerebrovascular Disease Paternal Grandfather      Alzheimer Disease Paternal Grandfather      Mental Illness Cousin      C.A.D. No family hx of      Cancer - colorectal No family hx of        Objective:  Blood pressure (!) 142/85, pulse 63, temperature 98.4  F (36.9  C), temperature source Oral, resp. rate 16, height 1.676 m (5' 6\"), weight 82.6 kg (182 lb), last menstrual period 07/20/2019, not currently breastfeeding.  Neck:  There is no lymphadenopathy or thyroid tenderness or enlargement  CV: Regular rate and rhythm without murmurs, rubs or gallops.  Chest: Clear to auscultation bilaterally.  No wheezes, rales or retractions.  Ext: no edema    Fetal head at 0-1 station with caput - suspect OA presenting    Labs:  Component      Latest Ref Rng & Units 5/4/2020   WBC      4.0 - 11.0 10e9/L 23.3 (H)   RBC Count      3.8 - 5.2 10e12/L 4.17   Hemoglobin      11.7 - 15.7 g/dL 12.2   Hematocrit      35.0 - 47.0 % 38.4   MCV      78 - 100 fl " 92   MCH      26.5 - 33.0 pg 29.3   MCHC      31.5 - 36.5 g/dL 31.8   RDW      10.0 - 15.0 % 15.1 (H)   Platelet Count      150 - 450 10e9/L 158   Creatinine      0.52 - 1.04 mg/dL 1.01   GFR Estimate      >60 mL/min/1.73:m2 72   GFR Estimate If Black      >60 mL/min/1.73:m2 84   AST      0 - 45 U/L 104 (H)   ALT      0 - 50 U/L 112 (H)       Assessment:  1. 34 year old prime postdates now with severe hypertension vs preeclampsia which came on in active phase of labor  2. Fit for surgery   3. She has been ruptured for 17.5 hours    Plan:  1. Labs per above  2. Magnesium  3. Nifedipine   4. Called OB back up and they will come in to assess for C section due to arrested descent of fetal head

## 2020-05-04 NOTE — L&D DELIVERY NOTE
"Delivery Summary    34 year old  presents at 41w0d for postdates induction of labor.   GBS negative status  Family Medicine patient of Dr. Madison Turner   Progressed to complete dilation at 2230 on 5/3 and could not descent passed +1 station for greater than 3 hours.   She was also diagnosed with pre-eclampsia with severe features based on persistent elevated severe range blood pressure readings and elevation in AST and ALT  OB MD service consulted for arrest of descent.   Patient counseled on primary  section; refer to consultation note for further details.     Underwent an uncomplicated PLTCS  Viable male infant delivered at 0221 at 2020 in cephalic presentation, LOP position of the vertex and significant caput noted.   Weight 8 lbs 4 oz  Apgar 8 and 9 at 1 and 5 minutes, respectively.   Placenta delivered spontaneously, intact  Uterine atony noted and resolved with standard IV Pitocin 30 units in 500 mL saline bag infused in addition to Hemabate  mcg, 10 units of intrauterine pitocin administered at the uterine fundus, misoprostol 800 mcg per rectum and tranexamic acid for prophylaxis against hemorrhage from uterine atony.   Normal appearing uterus, bilateral fallopian tubes and ovaries.   No intraperitoneal adhesions noted.    mL   \"Billy\"    Tom Molina MD  [facility]        "

## 2020-05-04 NOTE — CONSULTS
Patient seen in consultation at the request of Madison Turner MD for arrest of descent     I was consulted on Ms. Susan Rubalcava 34 year old  at 41w2d for arrest of descent. She was being induced for postdates gestation and progressed to complete dilation at 2230 on 5/3/2020. During her second stage of labor, she also was demonstrating persistent severe range elevated blood pressure readings but otherwise clinically asymptomatic in terms of pre-eclampsia symptoms. She was given an oral dose of Nifedipine to help with blood pressure elevation which helped decrease blood pressure readings to mild range. HELLP labs were drawn and returned remarkable for elevation in  ALT (112), AST (104) and Creatinine (1.01). Magnesium sulfate was started prior to HELLP labs returning at the standard loading dose of 4g followed by 2g/hr maintenance. Since return of labs particularly with mild elevation in creatinine level, her magnesium sulfate rate has been changed to 1 g/hr.   Patient was seen and evaluated. Cervix was noted to be complete for greater than 3 hours with no descent passed +1 station. Fetal heart tracing remained category 1.   I counseled patient that given above findings that the best course of action would be to proceed with a  section. Risks of  section were reviewed with patient including but not limited to bleeding, need for blood transfusion, infection, injury to surrounding organs (ie bowel/intestines, bladder, ureters, major blood vessels and nerves). If any of these organs are injured, then we identify it and try to fix it. If we cant fix it ourselves, then we consult surgeons that specialize in those areas that are damaged and they fix it for us. Unintended injuries can go unnoticed at the time of surgery as well, and present with complications days to weeks later. Patient was also counseled on risk of fetal injury as well as  hysterectomy for life threatening blood loss.    Patient conveyed understanding of the risks discussed. She agrees with proceeding to a  section. She signed the consent form.     Total time spent was 20 minutes; greater than 50% of time was spent in counseling and/or coordination of care for the above listed diagnoses, not including time spent on the procedure.    Tom Molina MD  [facility]

## 2020-05-04 NOTE — PLAN OF CARE
Data: Susan Rubalcava transferred to 442 via cart at 0700. Baby transferred via crib.  Action: Receiving unit notified of transfer: Yes. Patient and family notified of room change. Report given to Kathy HOBBS RN at 0710. Belongings sent to receiving unit. Accompanied by Registered Nurse. Oriented patient to surroundings. Call light within reach. ID bands double-checked with receiving RN.  Response: Patient tolerated transfer and is stable.    Patients mobililty level scored using the bedside mobility assistance tool (BMAT). Patient is at a mobility level test number: 1. Mobility equipment used: hovermat. Required assist of 2 staff members. Further use of BMAT scoring required.

## 2020-05-04 NOTE — PROGRESS NOTES
I saw the pt this afternoon and she states that she is feeling better and denies any chills or ill feeling.  I reviewed her mildly elevated Lactate level and discussed possible etiologies.  I also reviewed her repeat CBC with diff which is as follows    WBC  4.0 - 11.0 10e9/L  26.0High     31.4High          RBC Count  3.8 - 5.2 10e12/L  3.51Low     3.66Low         Hemoglobin  11.7 - 15.7 g/dL  10.3Low     11.0Low         Hematocrit  35.0 - 47.0 %  32.3Low     34.1Low         MCV  78 - 100 fl  92   93       MCH  26.5 - 33.0 pg  29.3   30.1       MCHC  31.5 - 36.5 g/dL  31.9   32.3       RDW  10.0 - 15.0 %  15.3High     15.4High         Platelet Count  150 - 450 10e9/L  64Low     75Low         Diff Method   Automated Method   Automated Method       % Neutrophils  %  86.8   87.9       % Lymphocytes  %  8.0   5.4       % Monocytes  %  3.3   4.5       % Eosinophils  %  0.0   0.0       % Basophils  %  0.2   0.2       % Immature Granulocytes  %  1.7   2.0       Nucleated RBCs  0 /100  0   0       Absolute Neutrophil  1.6 - 8.3 10e9/L  22.6High     27.6High         Absolute Lymphocytes  0.8 - 5.3 10e9/L  2.1   1.7       Absolute Monocytes  0.0 - 1.3 10e9/L  0.9   1.4High         Absolute Eosinophils  0.0 - 0.7 10e9/L  0.0   0.0       Absolute Basophils  0.0 - 0.2 10e9/L  0.1   0.1       Abs Immature Granulocytes  0 - 0.4 10e9/L  0.4   0.6High         Absolute Nucleated RBC   0.0       1). I do not believe this pt is septic at this time.  I will ask the hospitalist to review this case and make any suggestions.  2).  thrombocytopenia-  I believe this is a result of the severe preeclampsia.  The pt has no bleeding or sx of concern at this time.  She is stable on magnesium sulfate and BP are stable.  She denies H/A or RUQ discomfort

## 2020-05-04 NOTE — PROVIDER NOTIFICATION
05/03/20 2322   Provider Notification   Provider Name/Title Dr. Madison Turner   Method of Notification At Bedside   Notification Reason Maternal Vital Sign Change  (severe range BP)   MD ordered to re-check BP in 15 minutes, if another severe to start mag load.

## 2020-05-04 NOTE — PROVIDER NOTIFICATION
"   05/04/20 1334   Provider Notification   Provider Name/Title Dr. Aaron   Method of Notification Electronic Page   Request Evaluate-Remote   Notification Reason Lab Results   Text paged at this time: \"CBC results in. Afebrile still. Any new orders?\"  "

## 2020-05-04 NOTE — OP NOTE
[facility]   Full Operative Note    Patient Name:Susan Rubalcava  MRN: [id number]  YOB: 1985  Surgery Date: 2020    Surgeon: Tom Molina MD    Pre-operative Diagnosis: 1) Intrauterine pregnancy at 41w2d 2) Arrest of decent 3) Pre-eclampsia with severe features    Post-operative Diagnosis: 1) Viable male infant, delivered   Procedure: Primary low transverse  section via Pfannenstiel skin incision with two layer uterine closure    Anesthesia: Epidural     EBL: 711 mL   IV fluids: 1400 mL crystalloid  Urine Output: 550 mL of clear orange tinged urine at the end of the procedure    Complications: None  Specimen: Placenta   Drains: Ibarra catheter    Findings: Viable male infant delivered in cephalic presentation at 0221 on 2020. LOP position and significant caput. No nuchal cord. Thick meconium fluid noted at hysterotomy. Weight 8 lbs 4 oz. Apgar 8 and 9 at 1 and 5 minutes, respectively. Normal appearing uterus, bilateral fallopian tubes and ovaries. Uterine atony noted and resolved with standard IV Pitocin 30 units in 500 mL saline bag infused in addition to Hemabate  mcg, 10 units of intrauterine pitocin administered at the uterine fundus, misoprostol 800 mcg per rectum and tranexamic acid for prophylaxis against hemorrhage from uterine atony. No intraperitoneal adhesions.     Indication: I was consulted on Ms. Susan Rubalcava 34 year old  at 41w2d for arrest of descent. She was being induced for postdates gestation and progressed to complete dilation at 2230 on 5/3/2020. During her second stage of labor, she also was demonstrating persistent severe range elevated blood pressure readings but otherwise clinically asymptomatic in terms of pre-eclampsia symptoms. She was given an oral dose of Nifedipine to help with blood pressure elevation which helped decrease blood pressure readings to mild range. HELLP labs were drawn and returned remarkable for  elevation in  ALT (112), AST (104) and Creatinine (1.01). Magnesium sulfate was started prior to HELLP labs returning at the standard loading dose of 4g followed by 2g/hr maintenance. Since return of labs particularly with mild elevation in creatinine level, her magnesium sulfate rate has been changed to 1 g/hr.   Patient was seen and evaluated. Cervix was noted to be complete for greater than 3 hours with no descent passed +1 station. Fetal heart tracing remained category 1.   I counseled patient that given above findings that the best course of action would be to proceed with a  section. Risks of  section were reviewed with patient including but not limited to bleeding, need for blood transfusion, infection, injury to surrounding organs (ie bowel/intestines, bladder, ureters, major blood vessels and nerves). If any of these organs are injured, then we identify it and try to fix it. If we cant fix it ourselves, then we consult surgeons that specialize in those areas that are damaged and they fix it for us. Unintended injuries can go unnoticed at the time of surgery as well, and present with complications days to weeks later. Patient was also counseled on risk of fetal injury as well as  hysterectomy for life threatening blood loss.   Patient conveyed understanding of the risks discussed. She agrees with proceeding to a  section. She signed the consent form.      Technique: The patient was taken to the operating room where she was transferred to the OR table from her hospital bed and placed in supine position. Patient was prepped and draped in the usual sterile fashion. Epidural anesthesia was was re-bolused and found to be  Initially adequate. However, during testing of site of incision for pain revealed that patient was able to perceive. Following administration of local anesthesia via 10 mL of 1% lidocaine without epinephrine.  The OR table was tilted to the left. A formal TIME-OUT  was conducted with correct identification of the patient and procedure being performed.     A Pfannenstiel skin incision was made with the scalpel and carried down to the underlying fascia with the scalpel. The fascia was incised at the midline and extended laterally using Schwab scissors.  The superior and inferior aspects of the fascia were grasped with Kocher clamps, lifted and dissected off the underlying rectus muscles in a sharp fashion.  The rectus muscles were  in the midline in a digital blunt fashion. The peritoneum was entered in a digital blunt fashion and extended superiorly and inferiorly with careful attention to avoid injury to the bowel and bladder. Once adequate extension of the peritoneum was achieved to allow for eventual delivery of the baby, the Charbel 0 retractor was inserted into the peritoneum.       A lower uterine segment incision was made with the scalpel and extended laterally in a digital blunt fashion. The head was grasped, elevated and delivered along with the rest of the infant through the hysterotomy without difficulty. The infant was handed off to the waiting nursery team. The cord was clamped, cut and handed off to the waiting nursery team. Cord gases were collected and sent.       The placenta was delivered spontaneously using gentle traction of the cord. The uterus was exteriorized and cleared of all clots and debris. Gentle massage was employed to achieve firmness to the uterus along with the instillation of uterotonics in the way of IV Pitocin in normal saline. Uterine atony was still noted and therefore Hemabate  mcg, 10 units of intrauterine pitocin administered at the uterine fundus, misoprostol 800 mcg per rectum and tranexamic acid for prophylaxis against hemorrhage from uterine atony.  The hysterotomy incision was repaired with 0 Vicryl in a running locked fashion. An imbricating layer along the repaired hysterotomy was made using 0 Monocryl in a running  fashion. The repaired hysterotomy incision was inspected for hemostasis and deemed adequate. An additional figure of eight stitch using 0 Vicryl was made at the right aspect of the repaired hysterotomy incision for ongoing bleeding which was made hemostatic.      The posterior cul-de-sac was irrigated and suctioned, and the uterus returned to the abdomen. The repaired hysterotomy inspected for adequate hemostasis. The Charbel 0 retraction was removed from the peritoneum. Sepra film was applied over the repaired hysterotomy incision and the uterine fundus.       The peritoneum was approximated using 2-0 Vicryl in a running fashion. The rectus muscles were inspected for adequate hemostasis and not re-approximated. The fascia layer was reapproximated using 0 Vicryl in a running fashion. The subcutaneous layer was irrigated, inspected for adequate hemostasis and re-approximated using 3-0 plain gut in interrupted  fashion in one layer. The skin was closed with subcuticular fashion using Insorb absorbably staples     The patient tolerated the procedure well and was taken to the postpartum area in stable condition. IV clindamycin, gentamycin and azithromycin  were given during the procedure. Sponge, laps and needle counts were correct x 2.     Tom Molina MD  [facility]

## 2020-05-04 NOTE — PROVIDER NOTIFICATION
Patient having uncontrolable shaking. Blood sugar 124, temperature 97.7, and blood pressure 147/94. Dr. Aaron notified, orders to recheck temperature in a half hour and order CBC w/diff.

## 2020-05-04 NOTE — PHARMACY-AMINOGLYCOSIDE DOSING SERVICE
Gentamicin 1.5mg/kg q8h started for intra abd infection per pharmacy.  Adjusted body wt -68.6kg.  Patient is also on Clindamycin.    Creatinine for last 3 days  5/4/2020: 12:51 AM Creatinine 1.01 mg/dL    Nephrotoxins and other renal medications (From now, onward)    Start     Dose/Rate Route Frequency Ordered Stop    05/05/20 0330  ibuprofen (ADVIL/MOTRIN) tablet 800 mg      800 mg Oral EVERY 6 HOURS 05/04/20 0720      05/04/20 1500  gentamicin (GARAMYCIN) 100 mg in sodium chloride 0.9 % 50 mL intermittent infusion      1.5 mg/kg × 68.6 kg (Adjusted)  over 60 Minutes Intravenous EVERY 8 HOURS 05/04/20 1441      05/04/20 0930  ketorolac (TORADOL) injection 15 mg      15 mg Intravenous EVERY 6 HOURS 05/04/20 0720 05/05/20 0329          Contrast Orders - past 72 hours (72h ago, onward)    None        Oksana Pena, matyD

## 2020-05-04 NOTE — PROVIDER NOTIFICATION
05/04/20 0501 05/04/20 0502   Provider Notification   Provider Name/Title Dr. Tom Santos    Method of Notification Electronic Page Phone   Request  --  Evaluate-Remote   Notification Reason Vital Signs Change Vital Signs Change     Notified MD of severe range Bps x2 1 hour after giving 20mg of IV labetalol. MD ordered 40mg of IV labetalol to be given.

## 2020-05-04 NOTE — PROGRESS NOTES
[facility] Obstetrics Post-Op / Progress Note         Assessment and Plan:    Assessment:   Post-operative day #0  Low transverse primary  section  L&D complications: 1) Intrauterine pregnancy at 41w2d 2) Arrest of decent 3) Pre-eclampsia with severe features      Doing well.  No immediate surgical complications identified.  No excessive bleeding  Pain well-controlled.      Plan:   Breast feeding strategies discussed  Monitor wound for signs of infection  Pain control measures as needed  Reportable signs and symptoms dicussed with the patient  Monitor BP closely  Pt on magnesium sulfate  Pt has received IV Labetalol post op           Interval History:   Doing well.  Pain is controlled.  No fevers.  No history of wound drainage, warmth or significant erythema.  Good appetite.  Denies chest pain, shortness of breath, nausea or vomiting.             Significant Problems:      Past Medical History:   Diagnosis Date     ASCUS with positive high risk HPV     + HPV 16 colp - CANDIE I     Cervical high risk HPV (human papillomavirus) test positive , ,,,','17-     Contact dermatitis and other eczema, due to unspecified cause      LSIL (low grade squamous intraepithelial lesion) on Pap smear , 10/13, ,     +high risk HPV, CANDIE 1 on pathology x 3     Mild dysplasia of cervix 2019    repeat pap with HPV in one year     Rheumatism, unspecified and fibrositis     no repeat episodes since 2004     Rheumatoid arthritis of multiple sites with negative rheumatoid factor (H) 2004    Senior year of high school and first year of college and no symptoms since then.             Review of Systems:    The patient denies any chest pain, shortness of breath, excessive pain, fever, chills, purulent drainage from the wound, nausea or vomiting.          Medications:     All medications related to the patient's surgery have been reviewed  Current Facility-Administered Medications    Medication     acetaminophen (TYLENOL) tablet 975 mg     [START ON 5/6/2020] bisacodyl (DULCOLAX) Suppository 10 mg     calcium gluconate 10 % injection 1 g     dextrose 5% in lactated ringers infusion     fentaNYL (SUBLIMAZE) 2 mcg/mL, bupivacaine (MARCAINE) 0.125% in NS premix for PCEA     ferrous sulfate (FEROSUL) tablet 325 mg     hydrocortisone 2.5 % cream     [START ON 5/5/2020] ibuprofen (ADVIL/MOTRIN) tablet 800 mg     IF subcutaneous (SQ) Unfractionated heparin (UFH) ordered for thromboprophylaxis    Or     IF intravenous (IV) Unfractionated heparin (UFH) ordered    Or     IF LOW-dose Low molecular weight heparin (LMWH) thromboprophylaxis ordered    Or     IF HIGHER-dose Low molecular weight heparin (LMWH) thromboprophylaxis ordered     ketorolac (TORADOL) injection 15 mg     labetalol (NORMODYNE/TRANDATE) algorithm-medication instruction     labetalol (NORMODYNE/TRANDATE) syringe 20 mg     labetalol (NORMODYNE/TRANDATE) syringe 40 mg     labetalol (NORMODYNE/TRANDATE) syringe 80 mg     lactated ringers BOLUS 1,000 mL     lactated ringers BOLUS 250 mL     lactated ringers infusion     lanolin cream     lidocaine (LMX4) kit     lidocaine (PF) (XYLOCAINE) 1 % injection     lidocaine 1 % 0.1-1 mL     lidocaine-EPINEPHrine 1.5 %-1:[id number] injection 3 mL     LORazepam (ATIVAN) injection 2 mg     magnesium sulfate 2 g in water intermittent infusion     magnesium sulfate 4 g in 100 mL sterile water (premade)     magnesium sulfate infusion     magnesium sulfate injection 4 g     medication instruction     nalbuphine (NUBAIN) injection 2.5-5 mg     naloxone (NARCAN) injection 0.1-0.4 mg     naloxone (NARCAN) injection 0.1-0.4 mg     NIFEdipine (PROCARDIA) capsule 10 mg     No MMR Needed -  Assessment: Patient does not need MMR vaccine     NO Rho (D) immune globulin (RhoGam) needed - mother Rh POSITIVE     No Tdap Needed - Assessment: Patient does not need Tdap vaccine     ondansetron (ZOFRAN) injection 4 mg      ondansetron (ZOFRAN-ODT) ODT tab 4 mg    Or     ondansetron (ZOFRAN) injection 4 mg     Opioid plan postpartum - medication instruction     oxyCODONE (ROXICODONE) tablet 5 mg     oxytocin (PITOCIN) 30 units in 500 mL 0.9% NaCl infusion     oxytocin (PITOCIN) 30 units in 500 mL 0.9% NaCl infusion     oxytocin (PITOCIN) injection 10 Units     oxytocin (PITOCIN) injection     phenylephrine (ANTONIO-SYNEPHRINE) injection 100 mcg     prenatal multivitamin w/iron per tablet 1 tablet     senna-docusate (SENOKOT-S/PERICOLACE) 8.6-50 MG per tablet 1 tablet    Or     senna-docusate (SENOKOT-S/PERICOLACE) 8.6-50 MG per tablet 2 tablet     simethicone (MYLICON) chewable tablet 80 mg     sodium chloride (PF) 0.9% PF flush 3 mL     sodium chloride (PF) 0.9% PF flush 3 mL     sodium chloride 0.9% (bottle) irrigation     [START ON 5/6/2020] sodium phosphate (FLEET ENEMA) 1 enema     tranexamic acid 1 g in 100 mL 0.7% NaCl IV bag (premix)             Physical Exam:   Vitals were reviewed  All vitals stable  Temp: 97.8  F (36.6  C) Temp src: Oral BP: (!) 148/94 Pulse: 95   Resp: 16 SpO2: 98 % O2 Device: None (Room air)    Wound clean and dry with minimal or no drainage.  Surrounding skin with minimal erythema.          Data:     All laboratory data related to this surgery reviewed  Hemoglobin   Date Value Ref Range Status   05/04/2020 12.2 11.7 - 15.7 g/dL Final   05/02/2020 11.6 (L) 11.7 - 15.7 g/dL Final   03/30/2020 11.6 (L) 11.7 - 15.7 g/dL Final     Comment:     Results confirmed by repeat test   01/13/2020 10.6 (L) 11.7 - 15.7 g/dL Final     Comment:     Results confirmed by repeat test  Verified by smear review     11/11/2019 11.3 (L) 11.7 - 15.7 g/dL Final     Comment:     Results confirmed by repeat test     No imaging studies have been ordered    Booker Aaron MD

## 2020-05-04 NOTE — ANESTHESIA CARE TRANSFER NOTE
Patient: Susan Rubalcava    Procedure(s):   SECTION    Diagnosis: * No pre-op diagnosis entered *  Diagnosis Additional Information: No value filed.    Anesthesia Type:   No value filed.     Note:  Airway :Room Air  Patient transferred to:Labor and Delivery  Handoff Report: Identifed the Patient, Identified the Reponsible Provider, Reviewed the pertinent medical history, Discussed the surgical course, Reviewed Intra-OP anesthesia mangement and issues during anesthesia, Set expectations for post-procedure period and Allowed opportunity for questions and acknowledgement of understanding      Vitals: (Last set prior to Anesthesia Care Transfer)    CRNA VITALS  2020 0251 - 2020 0321      2020             Pulse:  103    SpO2:  98 %                Electronically Signed By: CARITO John CRNA  May 4, 2020  3:21 AM

## 2020-05-04 NOTE — PROVIDER NOTIFICATION
05/04/20 0947   Provider Notification   Provider Name/Title Dr. Aaron   Method of Notification Phone   Request Evaluate-Remote   Notification Reason Other   Dr. Aaron returned page regarding patient being on the ERAS protocol and not able to meet the order to remove Ibarra by 2 hours post op. MD would like Ibarra left in until patient stable and diuresing adequately post op from emergent c/s with pre-e with severe features. Primary RN updated.

## 2020-05-05 LAB
ALBUMIN SERPL-MCNC: 2.1 G/DL (ref 3.4–5)
ALBUMIN SERPL-MCNC: 2.1 G/DL (ref 3.4–5)
ALP SERPL-CCNC: 170 U/L (ref 40–150)
ALP SERPL-CCNC: 183 U/L (ref 40–150)
ALT SERPL W P-5'-P-CCNC: 277 U/L (ref 0–50)
ALT SERPL W P-5'-P-CCNC: 353 U/L (ref 0–50)
ANION GAP SERPL CALCULATED.3IONS-SCNC: 4 MMOL/L (ref 3–14)
ANION GAP SERPL CALCULATED.3IONS-SCNC: 5 MMOL/L (ref 3–14)
AST SERPL W P-5'-P-CCNC: 139 U/L (ref 0–45)
AST SERPL W P-5'-P-CCNC: 243 U/L (ref 0–45)
BASOPHILS # BLD AUTO: 0.1 10E9/L (ref 0–0.2)
BASOPHILS NFR BLD AUTO: 0.2 %
BILIRUB SERPL-MCNC: 0.3 MG/DL (ref 0.2–1.3)
BILIRUB SERPL-MCNC: 0.4 MG/DL (ref 0.2–1.3)
BUN SERPL-MCNC: 12 MG/DL (ref 7–30)
BUN SERPL-MCNC: 13 MG/DL (ref 7–30)
CALCIUM SERPL-MCNC: 7.5 MG/DL (ref 8.5–10.1)
CALCIUM SERPL-MCNC: 7.6 MG/DL (ref 8.5–10.1)
CHLORIDE SERPL-SCNC: 106 MMOL/L (ref 94–109)
CHLORIDE SERPL-SCNC: 108 MMOL/L (ref 94–109)
CO2 SERPL-SCNC: 24 MMOL/L (ref 20–32)
CO2 SERPL-SCNC: 25 MMOL/L (ref 20–32)
COPATH REPORT: NORMAL
COPATH REPORT: NORMAL
CREAT SERPL-MCNC: 0.78 MG/DL (ref 0.52–1.04)
CREAT SERPL-MCNC: 0.79 MG/DL (ref 0.52–1.04)
DIFFERENTIAL METHOD BLD: ABNORMAL
EOSINOPHIL # BLD AUTO: 0.1 10E9/L (ref 0–0.7)
EOSINOPHIL NFR BLD AUTO: 0.4 %
ERYTHROCYTE [DISTWIDTH] IN BLOOD BY AUTOMATED COUNT: 15.8 % (ref 10–15)
ERYTHROCYTE [DISTWIDTH] IN BLOOD BY AUTOMATED COUNT: 16.3 % (ref 10–15)
GFR SERPL CREATININE-BSD FRML MDRD: >90 ML/MIN/{1.73_M2}
GFR SERPL CREATININE-BSD FRML MDRD: >90 ML/MIN/{1.73_M2}
GLUCOSE SERPL-MCNC: 112 MG/DL (ref 70–99)
GLUCOSE SERPL-MCNC: 89 MG/DL (ref 70–99)
HCT VFR BLD AUTO: 32 % (ref 35–47)
HCT VFR BLD AUTO: 32.7 % (ref 35–47)
HGB BLD-MCNC: 10.5 G/DL (ref 11.7–15.7)
HGB BLD-MCNC: 10.5 G/DL (ref 11.7–15.7)
IMM GRANULOCYTES # BLD: 1 10E9/L (ref 0–0.4)
IMM GRANULOCYTES NFR BLD: 3.9 %
LYMPHOCYTES # BLD AUTO: 2.2 10E9/L (ref 0.8–5.3)
LYMPHOCYTES NFR BLD AUTO: 8.9 %
MAGNESIUM SERPL-MCNC: 5.1 MG/DL (ref 1.6–2.3)
MCH RBC QN AUTO: 29.8 PG (ref 26.5–33)
MCH RBC QN AUTO: 30.1 PG (ref 26.5–33)
MCHC RBC AUTO-ENTMCNC: 32.1 G/DL (ref 31.5–36.5)
MCHC RBC AUTO-ENTMCNC: 32.8 G/DL (ref 31.5–36.5)
MCV RBC AUTO: 92 FL (ref 78–100)
MCV RBC AUTO: 93 FL (ref 78–100)
MONOCYTES # BLD AUTO: 1.3 10E9/L (ref 0–1.3)
MONOCYTES NFR BLD AUTO: 5.1 %
NEUTROPHILS # BLD AUTO: 20.5 10E9/L (ref 1.6–8.3)
NEUTROPHILS NFR BLD AUTO: 81.5 %
NRBC # BLD AUTO: 0 10*3/UL
NRBC BLD AUTO-RTO: 0 /100
PLATELET # BLD AUTO: 68 10E9/L (ref 150–450)
PLATELET # BLD AUTO: 85 10E9/L (ref 150–450)
POTASSIUM SERPL-SCNC: 4.8 MMOL/L (ref 3.4–5.3)
POTASSIUM SERPL-SCNC: 4.9 MMOL/L (ref 3.4–5.3)
PROT SERPL-MCNC: 5.8 G/DL (ref 6.8–8.8)
PROT SERPL-MCNC: 6.2 G/DL (ref 6.8–8.8)
RBC # BLD AUTO: 3.49 10E12/L (ref 3.8–5.2)
RBC # BLD AUTO: 3.52 10E12/L (ref 3.8–5.2)
SODIUM SERPL-SCNC: 135 MMOL/L (ref 133–144)
SODIUM SERPL-SCNC: 137 MMOL/L (ref 133–144)
WBC # BLD AUTO: 25.1 10E9/L (ref 4–11)
WBC # BLD AUTO: 25.5 10E9/L (ref 4–11)

## 2020-05-05 PROCEDURE — 36415 COLL VENOUS BLD VENIPUNCTURE: CPT | Performed by: OBSTETRICS & GYNECOLOGY

## 2020-05-05 PROCEDURE — 25800030 ZZH RX IP 258 OP 636: Performed by: OBSTETRICS & GYNECOLOGY

## 2020-05-05 PROCEDURE — 12000000 ZZH R&B MED SURG/OB

## 2020-05-05 PROCEDURE — 25000132 ZZH RX MED GY IP 250 OP 250 PS 637: Performed by: OBSTETRICS & GYNECOLOGY

## 2020-05-05 PROCEDURE — 25000125 ZZHC RX 250: Performed by: OBSTETRICS & GYNECOLOGY

## 2020-05-05 PROCEDURE — 80053 COMPREHEN METABOLIC PANEL: CPT | Performed by: OBSTETRICS & GYNECOLOGY

## 2020-05-05 PROCEDURE — 85027 COMPLETE CBC AUTOMATED: CPT | Performed by: OBSTETRICS & GYNECOLOGY

## 2020-05-05 PROCEDURE — 85025 COMPLETE CBC W/AUTO DIFF WBC: CPT | Performed by: OBSTETRICS & GYNECOLOGY

## 2020-05-05 PROCEDURE — 25000128 H RX IP 250 OP 636: Performed by: OBSTETRICS & GYNECOLOGY

## 2020-05-05 PROCEDURE — 99231 SBSQ HOSP IP/OBS SF/LOW 25: CPT | Performed by: HOSPITALIST

## 2020-05-05 PROCEDURE — 83735 ASSAY OF MAGNESIUM: CPT | Performed by: OBSTETRICS & GYNECOLOGY

## 2020-05-05 PROCEDURE — 85018 HEMOGLOBIN: CPT | Performed by: OBSTETRICS & GYNECOLOGY

## 2020-05-05 RX ORDER — NIFEDIPINE 30 MG/1
30 TABLET, EXTENDED RELEASE ORAL DAILY
Status: DISCONTINUED | OUTPATIENT
Start: 2020-05-05 | End: 2020-05-07 | Stop reason: HOSPADM

## 2020-05-05 RX ORDER — BISACODYL 10 MG
10 SUPPOSITORY, RECTAL RECTAL DAILY PRN
Status: DISCONTINUED | OUTPATIENT
Start: 2020-05-05 | End: 2020-05-06

## 2020-05-05 RX ORDER — OXYCODONE HYDROCHLORIDE 5 MG/1
5 TABLET ORAL
Status: DISCONTINUED | OUTPATIENT
Start: 2020-05-05 | End: 2020-05-07 | Stop reason: HOSPADM

## 2020-05-05 RX ADMIN — ACETAMINOPHEN 975 MG: 325 TABLET, FILM COATED ORAL at 21:42

## 2020-05-05 RX ADMIN — FERROUS SULFATE TAB 325 MG (65 MG ELEMENTAL FE) 325 MG: 325 (65 FE) TAB at 08:19

## 2020-05-05 RX ADMIN — CLINDAMYCIN PHOSPHATE 900 MG: 900 INJECTION, SOLUTION INTRAVENOUS at 08:20

## 2020-05-05 RX ADMIN — GENTAMICIN SULFATE 100 MG: 40 INJECTION, SOLUTION INTRAMUSCULAR; INTRAVENOUS at 07:08

## 2020-05-05 RX ADMIN — BISACODYL 10 MG: 10 SUPPOSITORY RECTAL at 22:41

## 2020-05-05 RX ADMIN — OXYCODONE HYDROCHLORIDE 5 MG: 5 TABLET ORAL at 19:30

## 2020-05-05 RX ADMIN — OXYCODONE HYDROCHLORIDE 5 MG: 5 TABLET ORAL at 16:14

## 2020-05-05 RX ADMIN — ACETAMINOPHEN 975 MG: 325 TABLET, FILM COATED ORAL at 08:19

## 2020-05-05 RX ADMIN — OXYCODONE HYDROCHLORIDE 5 MG: 5 TABLET ORAL at 12:31

## 2020-05-05 RX ADMIN — SENNOSIDES AND DOCUSATE SODIUM 2 TABLET: 8.6; 5 TABLET ORAL at 22:41

## 2020-05-05 RX ADMIN — ACETAMINOPHEN 975 MG: 325 TABLET, FILM COATED ORAL at 03:12

## 2020-05-05 RX ADMIN — OXYCODONE HYDROCHLORIDE 5 MG: 5 TABLET ORAL at 22:41

## 2020-05-05 RX ADMIN — CLINDAMYCIN PHOSPHATE 900 MG: 900 INJECTION, SOLUTION INTRAVENOUS at 00:04

## 2020-05-05 RX ADMIN — SENNOSIDES AND DOCUSATE SODIUM 2 TABLET: 8.6; 5 TABLET ORAL at 08:19

## 2020-05-05 RX ADMIN — ACETAMINOPHEN 975 MG: 325 TABLET, FILM COATED ORAL at 14:25

## 2020-05-05 RX ADMIN — SIMETHICONE CHEW TAB 80 MG 80 MG: 80 TABLET ORAL at 16:14

## 2020-05-05 RX ADMIN — NIFEDIPINE 30 MG: 30 TABLET, FILM COATED, EXTENDED RELEASE ORAL at 18:07

## 2020-05-05 RX ADMIN — PRENATAL VITAMINS-IRON FUMARATE 27 MG IRON-FOLIC ACID 0.8 MG TABLET 1 TABLET: at 08:19

## 2020-05-05 RX ADMIN — SIMETHICONE CHEW TAB 80 MG 80 MG: 80 TABLET ORAL at 18:07

## 2020-05-05 NOTE — PROVIDER NOTIFICATION
05/05/20 1000   Provider Notification   Provider Name/Title Dr. Hooks   Method of Notification In Department   Request Evaluate-Remote   Notification Reason Other   Dr. Hooks in unit. Clarification on ibuprofen order, whether to discontinue due to patient's condition. MD would like medication held until tomorrow's lab results.

## 2020-05-05 NOTE — PLAN OF CARE
Patient doing well today. Magnesium sulfate, IV antibiotics, and Ibarra discontinued at 0930 this morning. Blood pressures remain elevated, but no other s/s of hypertension noted. She has voided x1 post Ibarra removal. She showered this morning and has been ambulating in the room. Continues to have abdominal distention and denies passing gas at this time. Patient will ambulate in hallway this afternoon once  is done breast feeding. Breast feedings poor to fair and  experiencing some jaundice. Support and education provided.

## 2020-05-05 NOTE — PLAN OF CARE
Pt up at edge of bed with 1 assist. Ibarra in place good urine output. Strict I and O. IV fluids running , tolerating diet. Denies symptoms of pre-eclampsia.  Incision covered CDI. Reports adequate pain control with current pain plan. Family present and supportive. Continues on IV antibiotics. Magnesium Sulfate running + reflexes and no clonus. Hospitalist and OB following. Mother attentive to infants needs, breast feeding. Patients mobililty level scored using the bedside mobility assistance tool (BMAT). Patient is at a mobility level test number: 3. Mobility equipment used: none required. Required assist of 1 staff members. Further use of BMAT scoring required.

## 2020-05-05 NOTE — PLAN OF CARE
Data: Vital signs within normal limits. Postpartum checks within normal limits, see flow record. Patient eating and drinking normally. Patient is on bedrest and strict I & O's, currently has a garcia catheter in place. Cath cares performed. Patient is being treated for HELLP and possible uterine infection and is receiving antibiotics and magnesium. Labs were elevated yesterday and redrawn at 0720.  Incision healing well, dressing clean, dry, and intact. Patient needs assistance with self cares and to care for infant.  Action: Patient medicated during the shift for pain and cramping. See MAR. Patient reassessed within 1 hour after each medication and pain was improved. Patient education done about safe infant sleep, breastfeeding cues and satiety, magnesium care and management. See flow record.  Response: Positive attachment behaviors observed with infant. Support person Randolph present and very attentive to patient and infant.    Plan: Will continue to monitor and prepare for discharge.

## 2020-05-05 NOTE — CONSULTS
[facility]  Hospitalist Consult Note  Name: Susan Rubalcava    MRN: [id number]  YOB: 1985    Age: 34 year old  Date of admission: 5/2/2020  Primary care provider: Madison Turner     Requesting Physician:  Dr Booker Aaron  Reason for consult:           Assessment and Plan:   Susan Rubalcava is a 34 year old female without significant active medical hx who was admitted for Labor.  She was found to be preeclamptic, her labor did not progress, and so was taken for c section around 2 am this morning.     Since delivery she has been moderately hypertensive, and remains on magnesium gtt.  She has leukocytosis to 23 and LA 3.2 suggestive of sepsis and Dr Aaron feels she has endometritis. She is currently receiving appropriate abx for that.     Further laboratory evaluation is notable for elevated LFTs and thrombocytopenia.     We are asked to provide assistance with multiple lab issues.     She is, however, feeling well.     1. Suspected sepsis with leukocytosis/elevated lactic acid from endometritis-agree with current abx regimen initiated by Dr Aaron (clindamycin and gent).  Blood cultures have been obtained.     2. Elevated LFTs and low platelets could be due to Pre-eclampsia/HELLP although I do not see e/o hemolysis (bili is normal) although retic count is up and haptoglobin is pending.  Peripheral smear.  Does not look consistent with acute DIC given high normal fibrinogen.  I discussed this with OB who agrees this may be the case, and given that she is diuresing that is a good sign. Will defer to OB re mag gtt management. Discussed with RN and patient need to notify MD with significant ruq pain/hypotension as at risk for hepatic rupture/subcapsular hematoma.  Currently she has none of this. Recheck LFTs CBC in am     3.  Hypertension/preeclamptic state:  Appears to be improving with diuresis/mag gtt.  Have ordered prn labetalol     Interesting case: thanks for the consult!  We'll  follow along           History of Present Illness:   Susan Rubalcava is a 34 year old female without significant active medical hx who was admitted for Labor.  She was found to be preeclamptic, her labor did not progress, and so was taken for c section around 2 am this morning.     Since delivery she has been moderately hypertensive, and remains on magnesium gtt.  She has leukocytosis to 23 and LA 3.2 suggestive of sepsis and Dr Aaron feels she has endometritis. She is currently receiving appropriate abx for that.     Further laboratory evaluation is notable for elevated LFTs and thrombocytopenia.     We are asked to provide assistance with multiple lab issues.     She is, however, feeling well.       History is obtained in discussion with the patient and her  as well as discussion with RN and Dr. Aaron          Past Medical History:     Past Medical History:   Diagnosis Date     ASCUS with positive high risk HPV 9/06    + HPV 16 colp - CANDIE I     Cervical high risk HPV (human papillomavirus) test positive 2006, '08,'13,'14,'16,'17-'19     Contact dermatitis and other eczema, due to unspecified cause      LSIL (low grade squamous intraepithelial lesion) on Pap smear 8/07, 10/13, 7/14, 8/14    +high risk HPV, CANDIE 1 on pathology x 3     Mild dysplasia of cervix 06/04/2019    repeat pap with HPV in one year     Rheumatism, unspecified and fibrositis 2004    no repeat episodes since 5/2004     Rheumatoid arthritis of multiple sites with negative rheumatoid factor (H) 7/29/2004    Senior year of high school and first year of college and no symptoms since then.             Past Surgical History:     Past Surgical History:   Procedure Laterality Date     COLPOSCOPY  06/04/2019    mild dysplasia with L Mcmanus - repeat pap with HPV in one year               Social History:     Social History     Tobacco Use     Smoking status: Never Smoker     Smokeless tobacco: Never Used   Substance Use Topics     Alcohol use: Not  "Currently             Family History:   Family History   Family history reviewed with patient and is noncontributory.          Allergies:     Allergies   Allergen Reactions     Amoxicillin              Medications:     Prior to Admission medications    Medication Sig Last Dose Taking? Auth Provider   ferrous sulfate (FEROSUL) 325 (65 Fe) MG tablet Take 1 tablet (325 mg) by mouth 2 times daily 2020 at Unknown time Yes Madison Turner MD   Prenatal Vit-Fe Fumarate-FA (PRENATAL MULTIVITAMIN W/IRON) 27-0.8 MG tablet Take 1 tablet by mouth daily 2020 at Unknown time Yes Madison Turner MD   order for DME Equipment being ordered: one double electric breast pump   Madison Turner MD       Current hospital administered medication list (MAR) also reviewed.          Review of Systems:   A comprehensive greater than 10 system review of systems was carried out.  Pertinent positives and negatives are noted above.  Otherwise negative for contributory info.            Physical Exam:   Blood pressure (!) 141/92, pulse 80, temperature 98.1  F (36.7  C), temperature source Oral, resp. rate 16, height 1.676 m (5' 6\"), weight 82.6 kg (182 lb), last menstrual period 2019, SpO2 100 %, unknown if currently breastfeeding.  Exam:  GENERAL: No apparent distress. Awake, alert, and fully oriented.  HEENT: Normocephalic, atraumatic. Extraocular movements intact.  Normal respiratory effort.    Alert and oriented affect appropriate fritz     Not further examined in light of having  in lap and current COVID pandemic and multiple PUI r/o tonight.     Maintained shield/mask during entire interview         Data:   Imaging:  Personally reviewed.    EKG/Telemetry:  Personally reviewed.    Labs: Personally reviewed.   Recent Labs   Lab 20  1821 20  0051     --    POTASSIUM 4.7  --    CHLORIDE 105  --    CO2 24  --    ANIONGAP 6  --    *  --    BUN 13  --    CR 0.91 1.01   GFRESTIMATED 82 72   GFRESTBLACK >90 " 84   AGNIESZKA 7.7*  --      Recent Labs   Lab 05/04/20  1821 05/04/20  0051     --    POTASSIUM 4.7  --    CHLORIDE 105  --    CO2 24  --    ANIONGAP 6  --    *  --    BUN 13  --    CR 0.91 1.01   GFRESTIMATED 82 72   GFRESTBLACK >90 84   AGNIESZKA 7.7*  --    PROTTOTAL 5.5*  --    ALBUMIN 2.0*  --    BILITOTAL 0.6  --    ALKPHOS 181*  --    * 104*   * 112*     Recent Labs   Lab 05/04/20 1821   DD 3.2*     Recent Labs   Lab 05/04/20 1821 05/04/20  1418   LACT 1.5 3.2*     retic count 3.5, absolute retic 117.5  Fibrinogen 410 (200-420)    Jennifer Collins MD  [facility]

## 2020-05-05 NOTE — LACTATION NOTE
This note was copied from a baby's chart.  LC attempted visit.  Susan was in the shower. LC offered lactation support and encouraged them to call when ready to nurse infant.

## 2020-05-05 NOTE — PROVIDER NOTIFICATION
05/05/20 0000   Provider Notification   Provider Name/Title Dr Aaron    Method of Notification Electronic Page   Request Evaluate-Remote   Notification Reason Status Update   Follow-up with MD on pt's status. Plan to continue with Magnesium Sulfate overnight will follow-up with labs in the am.

## 2020-05-05 NOTE — PROVIDER NOTIFICATION
05/05/20 173   Provider Notification   Provider Name/Title Dr. Hooks   Method of Notification Phone   Request Evaluate-Remote   Notification Reason Status Update;Medication Request     RN updated MD that patient's abdomen is distended and tender to the touch; RN unable to palpate uterus d/t pain rated 8/10. Patient also has had elevated repeat BPs bordering severe range since 1200 today. MD will enter orders for repeat labs this PM, change oxycodone from Q2 to Q4 and start patient on scheduled procardia. POC to encourage ambulation and use abdominal binder this evening.

## 2020-05-05 NOTE — PROGRESS NOTES
[facility]    Hospitalist Consult Progress Note      Assessment & Plan   Susan Rubalcava is a 34 year old female without significant active medical hx who was admitted for Labor.  She was found to be preeclamptic, her labor did not progress, and so was taken for c section around 2 am on 05/04.  Since delivery she has been moderately hypertensive, and remains on magnesium gtt.  She had leukocytosis to 23, elevated lactic acid and started on abx for likely endometritis.  Further laboratory evaluation is notable for elevated LFTs and thrombocytopenia suggestive of pre-eclampsia/HELLP.      Today, patient feels generally well.  Endorses surgical pain rated 4/10.  Worse with movement.  Denies CP, fevers/chills, HA, changes in vision, n/v.  No RUQ abdominal pain.  No significant bleeding.  She has been HTN overnight, but otherwise afebrile and HDS.      #Possible sepsis with leukocytosis/elevated lactic acid from endometritis: No burning/changes in urination.  Patient was started on clindamycin and gentamicin.  Lactic acid has normalized.  Follow blood cultures.  Treat endometritis per OB/GYN.     #Elevated LFTs and low platelets is likely due to HELLP/pre-ecclampsia.  Her fibrinogen is normal which speaks against DIC and she does not clinically have DIC without significant bleeding.  She seems to be clinically improving.     -Management of pre-ecclampsia per OB/GYN.  Pt currently on magnesium with prn antihypertensives.     Did discuss the case with Dr. Hooks today.  She feels comfortable with the management moving forward.  Do not hesitate to contact hospital medicine for further questions or concerns.  We will sign off at this time.    Erasto Garcia MD  Text Page    Interval History   Seen this AM.  Discussed with Dr. Hooks.  Pt endorses 4/10 lower abdominal pain at surgical site.  Denies n/v.  No fevers.  Tolerating diet.  No CP, SOB, cough.  No changes in urination.      -Data reviewed today: I reviewed  all new labs and imaging results over the last 24 hours.     Physical Exam   Temp: 98.1  F (36.7  C) Temp src: Oral BP: (!) 148/98 Pulse: 99 Heart Rate: 100 Resp: 16 SpO2: 100 %      Vitals:    05/02/20 2352   Weight: 82.6 kg (182 lb)     Vital Signs with Ranges  Temp:  [97.6  F (36.4  C)-98.1  F (36.7  C)] 98.1  F (36.7  C)  Pulse:  [76-99] 99  Heart Rate:  [] 100  Resp:  [16-18] 16  BP: (114-148)/(64-98) 148/98  SpO2:  [99 %-100 %] 100 %  I/O last 3 completed shifts:  In: 5368.67 [P.O.:1100; I.V.:4268.67]  Out: 7800 [Urine:7800]    Generally, patient is in no acute distress.  She is holding her baby and appears comfortable.  She answers questions appropriately.  She has normal work of breathing.  Extremities are warm and well-perfused.  She answers questions appropriately.    Medications     dextrose 5% lactated ringers Stopped (05/04/20 0741)     labetalol (NORMODYNE/TRANDATE) algorithm-medication instruction       lactated ringers 100 mL/hr (05/05/20 0004)     - MEDICATION INSTRUCTIONS -       - MEDICATION INSTRUCTIONS -       NO Rho (D) immune globulin (RhoGam) needed - mother Rh POSITIVE       - MEDICATION INSTRUCTIONS -       - MEDICATION INSTRUCTIONS -       oxytocin in 0.9% NaCl Stopped (05/04/20 0920)     oxytocin in 0.9% NaCl         acetaminophen  975 mg Oral Q6H     fentaNYL (SUBLIMAZE) 2 mcg/mL, bupivacaine (MARCAINE) 0.125% in NaCl 0.9% for PCEA   EPIDURAL PCEA     ferrous sulfate  325 mg Oral BID     ibuprofen  800 mg Oral Q6H     - MEDICATION INSTRUCTIONS -   Does not apply See Admin Instructions    Or     - MEDICATION INSTRUCTIONS -   Does not apply See Admin Instructions    Or     - MEDICATION INSTRUCTIONS -   Does not apply See Admin Instructions    Or     - MEDICATION INSTRUCTIONS -   Does not apply See Admin Instructions     prenatal multivitamin w/iron  1 tablet Oral Daily     senna-docusate  1 tablet Oral BID    Or     senna-docusate  2 tablet Oral BID     sodium chloride (PF)  3 mL  Intracatheter Q8H       Data   Recent Labs   Lab 05/05/20  0719 05/04/20  1821 05/04/20  1620 05/04/20  1230 05/04/20  0051   WBC 25.1*  --  26.0* 31.4* 23.3*   HGB 10.5*  --  10.3* 11.0* 12.2   MCV 92  --  92 93 92   PLT 68*  --  64* 75* 158    135  --   --   --    POTASSIUM 4.8 4.7  --   --   --    CHLORIDE 108 105  --   --   --    CO2 24 24  --   --   --    BUN 13 13  --   --   --    CR 0.79 0.91  --   --  1.01   ANIONGAP 5 6  --   --   --    AGNIESZKA 7.5* 7.7*  --   --   --    GLC 89 135*  --   --   --    ALBUMIN 2.1* 2.0*  --   --   --    PROTTOTAL 5.8* 5.5*  --   --   --    BILITOTAL 0.4 0.6  --   --   --    ALKPHOS 170* 181*  --   --   --    * 447*  --   --  112*   * 444*  --   --  104*       No results found for this or any previous visit (from the past 24 hour(s)).

## 2020-05-05 NOTE — PROVIDER NOTIFICATION
05/04/20 2024   Provider Notification   Provider Name/Title Dr Collins   Method of Notification Phone   Request Evaluate-Remote   Notification Reason Status Update;Lab Results   Spoke to MD about plan for pt,lab results and  discontinuing Toradol due to Low Platelets.

## 2020-05-05 NOTE — PROGRESS NOTES
[facility]    Obstetrics Post-Op / Progress Note    Assessment & Plan   Assessment:  -1 Day Post-Op  Procedure(s):   SECTION     HELLP syndrome  Persistent postpartum hypertension     Improving, but having pain control issues (no ibuprofen with her hypertension and platelets low)  Shaking chills yesterday, resolved now. 2 doses IV antibiotics given.  Diuresing well.    Plan:    HELLP: labs this evening again given increasing blood pressures throughout the day. Mag off this am. Platelets trending up and LFTs trending down, recheck every 12 hours for now. Persistent blood pressures above 150/100, so will start Procardia ER this evening at 30 mg daily.    Possible endomyometritis: asymptomatic as of this morning, and no documented fevers. Antibiotics stopped and no fevers or new symptoms since. Continue to monitor vitals closely.    Routine post- cares:  Ambulation encouraged  Breast feeding strategies discussed  Monitor wound for signs of infection  Pain control measures as needed  Reportable signs and symptoms dicussed with the patient  Increased oxycodone to 5 mg every 2 hours to encourage ambulation and help with pain relief.    Joy Hooks MD    Interval History   Doing well.  Pain is well-controlled.  No fevers.  No history of wound drainage, warmth or significant erythema.  Good appetite.  Denies chest pain, shortness of breath, nausea or vomiting.  Ambulatory.  Breastfeeding well.    Medications     dextrose 5% lactated ringers Stopped (20 0741)     labetalol (NORMODYNE/TRANDATE) algorithm-medication instruction       lactated ringers 100 mL/hr (20 0004)     - MEDICATION INSTRUCTIONS -       - MEDICATION INSTRUCTIONS -       NO Rho (D) immune globulin (RhoGam) needed - mother Rh POSITIVE       - MEDICATION INSTRUCTIONS -       - MEDICATION INSTRUCTIONS -       oxytocin in 0.9% NaCl Stopped (20 0920)     oxytocin in 0.9% NaCl         acetaminophen  975 mg  Oral Q6H     fentaNYL (SUBLIMAZE) 2 mcg/mL, bupivacaine (MARCAINE) 0.125% in NaCl 0.9% for PCEA   EPIDURAL PCEA     ferrous sulfate  325 mg Oral BID     ibuprofen  800 mg Oral Q6H     - MEDICATION INSTRUCTIONS -   Does not apply See Admin Instructions    Or     - MEDICATION INSTRUCTIONS -   Does not apply See Admin Instructions    Or     - MEDICATION INSTRUCTIONS -   Does not apply See Admin Instructions    Or     - MEDICATION INSTRUCTIONS -   Does not apply See Admin Instructions     NIFEdipine ER OSMOTIC  30 mg Oral Daily     prenatal multivitamin w/iron  1 tablet Oral Daily     senna-docusate  1 tablet Oral BID    Or     senna-docusate  2 tablet Oral BID     sodium chloride (PF)  3 mL Intracatheter Q8H       Physical Exam   Temp: 97.6  F (36.4  C) Temp src: Oral BP: (!) 155/88 Pulse: 104 Heart Rate: 100 Resp: 16 SpO2: 100 %      Vitals:    05/02/20 2352   Weight: 82.6 kg (182 lb)     Vital Signs with Ranges  Temp:  [97.6  F (36.4  C)-98.1  F (36.7  C)] 97.6  F (36.4  C)  Pulse:  [] 104  Heart Rate:  [] 100  Resp:  [16] 16  BP: (133-166)/() 155/88  SpO2:  [100 %] 100 %  I/O last 3 completed shifts:  In: 4005.27 [P.O.:1850; I.V.:2155.27]  Out: 6250 [Urine:6250]    Uterine fundus is firm, non-tender and at the level of the umbilicus  Incision C/D/I  Extremities Non-tender    Data   Recent Labs   Lab Test 05/02/20 2120 08/28/19  1445   ABO A A   RH Pos Pos   AS  --  Neg     Recent Labs   Lab Test 05/05/20  0719 05/04/20  1620   HGB 10.5* 10.3*     Recent Labs   Lab Test 08/28/19  1445   RUQIGG 248

## 2020-05-05 NOTE — LACTATION NOTE
"This note was copied from a baby's chart.  LC follow up. Infant prefers to nurse on the right and Susan reports \"better\" feeds on the right. She had been unsuccessful on the left so LC worked on positioning on the left.  Colostrum was thick but easily expressed to entice the latch.  Infant nursed fair on the left.  He requires constant stimulation to keep a nutritive suck pattern. LC awakened him several times by taking him off the breast, burping, and stimulating him.  He was then placed on the right, but also fell asleep quickly.  LC suggested that she hand express post feeds and offer all EBM back to infant.  She may also consider using the electric pump for added stimulation. Infant has a HIR jaundice level which seems to be affecting nursing.   "

## 2020-05-05 NOTE — PROGRESS NOTES
CMP and PIH lab panel reviewed.  I/O's reviewed  Labs consistent with HELLP syndrome.  Pt is diuresing nicely and denies H/A visual changes or RUQ discomfort.  BP readings reviewed and are improved    P:  Will continue with magnesium sulfate and close monitoring  Recheck labs in am  Do not see clinical evidence of DIC at this time

## 2020-05-06 LAB
ALBUMIN SERPL-MCNC: 2.2 G/DL (ref 3.4–5)
ALP SERPL-CCNC: 220 U/L (ref 40–150)
ALT SERPL W P-5'-P-CCNC: 224 U/L (ref 0–50)
ANION GAP SERPL CALCULATED.3IONS-SCNC: 4 MMOL/L (ref 3–14)
AST SERPL W P-5'-P-CCNC: 90 U/L (ref 0–45)
BILIRUB SERPL-MCNC: 0.5 MG/DL (ref 0.2–1.3)
BUN SERPL-MCNC: 14 MG/DL (ref 7–30)
CALCIUM SERPL-MCNC: 7.9 MG/DL (ref 8.5–10.1)
CHLORIDE SERPL-SCNC: 106 MMOL/L (ref 94–109)
CO2 SERPL-SCNC: 25 MMOL/L (ref 20–32)
CREAT SERPL-MCNC: 0.78 MG/DL (ref 0.52–1.04)
ERYTHROCYTE [DISTWIDTH] IN BLOOD BY AUTOMATED COUNT: 16.7 % (ref 10–15)
GFR SERPL CREATININE-BSD FRML MDRD: >90 ML/MIN/{1.73_M2}
GLUCOSE SERPL-MCNC: 101 MG/DL (ref 70–99)
HAPTOGLOB SERPL-MCNC: 28 MG/DL (ref 32–197)
HCT VFR BLD AUTO: 36.7 % (ref 35–47)
HGB BLD-MCNC: 11.6 G/DL (ref 11.7–15.7)
MCH RBC QN AUTO: 29.7 PG (ref 26.5–33)
MCHC RBC AUTO-ENTMCNC: 31.6 G/DL (ref 31.5–36.5)
MCV RBC AUTO: 94 FL (ref 78–100)
PLATELET # BLD AUTO: 130 10E9/L (ref 150–450)
POTASSIUM SERPL-SCNC: 4.6 MMOL/L (ref 3.4–5.3)
PROT SERPL-MCNC: 6.1 G/DL (ref 6.8–8.8)
RBC # BLD AUTO: 3.9 10E12/L (ref 3.8–5.2)
SODIUM SERPL-SCNC: 135 MMOL/L (ref 133–144)
WBC # BLD AUTO: 23.5 10E9/L (ref 4–11)

## 2020-05-06 PROCEDURE — 25000132 ZZH RX MED GY IP 250 OP 250 PS 637: Performed by: OBSTETRICS & GYNECOLOGY

## 2020-05-06 PROCEDURE — 80053 COMPREHEN METABOLIC PANEL: CPT | Performed by: OBSTETRICS & GYNECOLOGY

## 2020-05-06 PROCEDURE — 85027 COMPLETE CBC AUTOMATED: CPT | Performed by: OBSTETRICS & GYNECOLOGY

## 2020-05-06 PROCEDURE — 12000000 ZZH R&B MED SURG/OB

## 2020-05-06 PROCEDURE — 36415 COLL VENOUS BLD VENIPUNCTURE: CPT | Performed by: OBSTETRICS & GYNECOLOGY

## 2020-05-06 RX ADMIN — ACETAMINOPHEN 975 MG: 325 TABLET, FILM COATED ORAL at 22:04

## 2020-05-06 RX ADMIN — ACETAMINOPHEN 975 MG: 325 TABLET, FILM COATED ORAL at 03:31

## 2020-05-06 RX ADMIN — ACETAMINOPHEN 975 MG: 325 TABLET, FILM COATED ORAL at 15:44

## 2020-05-06 RX ADMIN — SENNOSIDES AND DOCUSATE SODIUM 2 TABLET: 8.6; 5 TABLET ORAL at 22:04

## 2020-05-06 RX ADMIN — SIMETHICONE CHEW TAB 80 MG 80 MG: 80 TABLET ORAL at 11:28

## 2020-05-06 RX ADMIN — IBUPROFEN 800 MG: 800 TABLET ORAL at 15:44

## 2020-05-06 RX ADMIN — OXYCODONE HYDROCHLORIDE 5 MG: 5 TABLET ORAL at 00:35

## 2020-05-06 RX ADMIN — FERROUS SULFATE TAB 325 MG (65 MG ELEMENTAL FE) 325 MG: 325 (65 FE) TAB at 09:35

## 2020-05-06 RX ADMIN — NIFEDIPINE 30 MG: 30 TABLET, FILM COATED, EXTENDED RELEASE ORAL at 09:35

## 2020-05-06 RX ADMIN — PRENATAL VITAMINS-IRON FUMARATE 27 MG IRON-FOLIC ACID 0.8 MG TABLET 1 TABLET: at 09:35

## 2020-05-06 RX ADMIN — SIMETHICONE CHEW TAB 80 MG 80 MG: 80 TABLET ORAL at 22:14

## 2020-05-06 RX ADMIN — OXYCODONE HYDROCHLORIDE 5 MG: 5 TABLET ORAL at 22:04

## 2020-05-06 RX ADMIN — SIMETHICONE CHEW TAB 80 MG 80 MG: 80 TABLET ORAL at 04:02

## 2020-05-06 RX ADMIN — SIMETHICONE CHEW TAB 80 MG 80 MG: 80 TABLET ORAL at 18:23

## 2020-05-06 RX ADMIN — OXYCODONE HYDROCHLORIDE 5 MG: 5 TABLET ORAL at 11:28

## 2020-05-06 RX ADMIN — OXYCODONE HYDROCHLORIDE 5 MG: 5 TABLET ORAL at 08:09

## 2020-05-06 RX ADMIN — SENNOSIDES AND DOCUSATE SODIUM 2 TABLET: 8.6; 5 TABLET ORAL at 09:35

## 2020-05-06 RX ADMIN — ACETAMINOPHEN 975 MG: 325 TABLET, FILM COATED ORAL at 09:35

## 2020-05-06 ASSESSMENT — MIFFLIN-ST. JEOR: SCORE: 1515.99

## 2020-05-06 NOTE — PROVIDER NOTIFICATION
05/06/20 1245   Provider Notification   Provider Name/Title Dr. Vang   Method of Notification At Bedside   Request Evaluate-Remote   Notification Reason Medication Request    Notified of itchy rash on abdomen where betadine was used. VORB to use Hydrocortisone.

## 2020-05-06 NOTE — PROVIDER NOTIFICATION
05/05/20 2131   Provider Notification   Provider Name/Title Dr. Hooks   Method of Notification Phone   Request Evaluate-Remote   Notification Reason Status Update;Medication Request     RN updated MD that after simethicone 160 mg and ambulation in hallway, patient's abdomen is still significantly distended and tender. Bowel sounds are diminished but present in all quadrants. Patient has not passed gas or had BM since delivery. RN is concerned that patient may have illeus or bowel obstruction. MD feels that signs are not significant enough to warrant CT scan or bedside consult at this time. VO to administer ducolax suppository and continue to monitor.

## 2020-05-06 NOTE — PROGRESS NOTES
[facility] Obstetrics Post-Op / Progress Note         Assessment and Plan:    Assessment:   Post-operative day #2  Low transverse primary  section  L&D complications: Arrest of descent  HELLP syndrome      Clean wound without signs of infection.  No immediate surgical complications identified.  No excessive bleeding  Pain well-controlled.  HELLP labs improving, diuresing, BPs improved on Nifedipine      Plan:   Ambulation encouraged  Monitor wound for signs of infection  Pain control measures as needed  Reportable signs and symptoms dicussed with the patient  Continue Q12 hr HELLP labs           Interval History:   Doing well.  Pain is well-controlled.  No fevers.  No history of wound drainage, warmth or significant erythema.  Good appetite.  Denies chest pain, shortness of breath, nausea or vomiting.  Ambulatory.           Significant Problems:      Past Medical History:   Diagnosis Date     ASCUS with positive high risk HPV     + HPV 16 colp - CANDIE I     Cervical high risk HPV (human papillomavirus) test positive , ,,,,17-     Contact dermatitis and other eczema, due to unspecified cause      LSIL (low grade squamous intraepithelial lesion) on Pap smear , 10/13, ,     +high risk HPV, CANDIE 1 on pathology x 3     Mild dysplasia of cervix 2019    repeat pap with HPV in one year     Rheumatism, unspecified and fibrositis     no repeat episodes since 2004     Rheumatoid arthritis of multiple sites with negative rheumatoid factor (H) 2004    Senior year of high school and first year of college and no symptoms since then.             Review of Systems:    The patient denies any chest pain, shortness of breath, excessive pain, fever, chills, purulent drainage from the wound, nausea or vomiting.          Medications:   All medications related to the patient's surgery have been reviewed          Physical Exam:     All vitals stable  Patient Vitals for the  past 12 hrs:   BP Temp Temp src Pulse Heart Rate Resp Weight   05/06/20 0737 134/87 98  F (36.7  C) Oral -- 113 16 --   05/06/20 0540 -- -- -- -- -- -- 79.9 kg (176 lb 3.2 oz)   05/06/20 0400 (!) 142/94 98  F (36.7  C) Oral -- 110 16 --   05/06/20 0020 (!) 149/95 97.7  F (36.5  C) Oral -- 107 16 --   05/05/20 2100 (!) 144/90 98.6  F (37  C) Oral 108 -- 16 --     Wound clean and dry with minimal or no drainage.  Surrounding skin with minimal erythema.  Ext NT          Data:     Lab Results   Component Value Date    WBC 23.5 (H) 05/06/2020    HGB 11.6 (L) 05/06/2020    HCT 36.7 05/06/2020     (L) 05/06/2020     05/06/2020    POTASSIUM 4.6 05/06/2020    CHLORIDE 106 05/06/2020    CO2 25 05/06/2020    BUN 14 05/06/2020    CR 0.78 05/06/2020     (H) 05/06/2020    DD 3.2 (H) 05/04/2020    AST 90 (H) 05/06/2020     (H) 05/06/2020    ALKPHOS 220 (H) 05/06/2020    BILITOTAL 0.5 05/06/2020     -    Montrell Vang MD  5/6/2020 8:06 AM

## 2020-05-06 NOTE — PLAN OF CARE
Patient stable and meeting expected outcomes.  Blood pressures remain elevated, but stable at this time. No severe range pressures this shift.  No complaints of PIH symptoms.  Reflexes hypoactive; no clonus.  Repeat labs at 0600.  I/O followed as able.  Adequately urine output noted.  Breastfeeding fair/well with some assistance from staff and  with positioning and achieving latch.  Up independently and voiding adequately.  Pain improving from previous shift with tylenol and oxycodone.  Also using abdominal binder when up and moving.  Simethicone given for gas pain/distention.  Patient states she feels better than earlier; still not passing much gas.  Patient was able to have BM this morning.  Encouraged patient to keep drinking water and ambulating as able.  Bowel sounds audible and active in all quadrants.  Incision WDL.  Able to perform all cares for self and infant.  Bonding well with baby.   present and very supportive at bedside.  Will continue to monitor.

## 2020-05-06 NOTE — PROGRESS NOTES
I was called by the patient s primary nurse with concerns of continued diminished bowel sounds and lack of flatus.    She received simethicone earlier for abdominal distention and discomfort. This resulted in a slight improvement in symptoms but not much. Her oxycodone was also increased earlier to 5 mg every two hours rather than every four hours. This resulted in improvement in her pain control. She does not feel ill other than th e abdominal distention. Her nurse notes that the abdomen is quite tender to the touch when she attempts fundal check. The incision still appears Intact and dry. She had repeat labs done this evening which showed a continuation in her elevated white blood cell count, but an improvement in her platelets, stable hemoglobin, and improvement in LFTs. She is not nauseated, and has been eating a normal diet. She has been up and ambulating and  is able to take care of the baby. She has been afebrile. Antibiotics were stopped this morning.     Her nurse was wondering about a course of action if I was concerned for possible obstruction versus ileus. We discussed the clinical course of both entities. Discussed that first step and diagnosis would be CT scan. I think ileus is more likely than obstruction, and this usually resolves on its own. Since the patient is not ill appear ing, is tolerating an oral diet, and is not feeling nausea, at this point, I would not send her for a CT scan. Her abdominal distention is likely the result of gas, since her hemoglobin is stable and I do not think she is experiencing active bleeding internally giving her clinical stability. I would like to try a Dulcolax suppository to see if we can get some gas moving through. Certainly, if her clinical picture evolves to include othe r symptoms such as fever, nausea, or vomiting, we will have to reconsider other options including the possibility of CT scan, repeating labs early, or restarting antibiotics if endomyometritis  seems more likely.    Her nurse was comfortable with the plan as documented above, and will keep me updated if there is a change in the patient s clinical status.    Joy Hooks MD

## 2020-05-06 NOTE — PLAN OF CARE
Data: Vital signs within normal limits. Postpartum checks within normal limits - see flow record. Patient eating and drinking normally. Patient able to empty bladder independently and is up ambulating, pt ambulated in hallway this shift. No apparent signs of infection. Patient performing self cares, is able to care for infant and is breast feeding every 2-3 hours.  Incision appears within normal. Is using tylenol and oxycodone for incision discomfort.  Rested in bed this shift.  Action: Patient medicated during the shift for pain. See MAR. Patient reassessed within 1 hour after each medication and pain was improved - patient stated she was comfortable. Patient education done, see flow record.  Response: Positive attachment behaviors observed with infant. Patient's support present this shift.   Plan: Continue current plan of care.  Anticipate discharge on 5/7/20.

## 2020-05-06 NOTE — LACTATION NOTE
This note was copied from a baby's chart.  LC visit. Infant has been nursing better today.  He required some donor milk last night after cluster feeding many hours in a row with low output.  LC observed an excellent latch on both sides this morning.  Susan was independent with positioning him on both sides.  Swallows noted and pointed out to parents. Placenta was positive for chorio but afebrile in labor.  Infant looks well and active with nursing, but  recommended that if any concerns with infant output they may consider some donor milk today.  If donor milk continues,  also recommends initiation of pumping for help with lactogenesis.  All questions were answered.  No overall concerns.  Jaundice level also has improved. Breastfeeding improvements evident.

## 2020-05-07 ENCOUNTER — LACTATION ENCOUNTER (OUTPATIENT)
Age: 35
End: 2020-05-07

## 2020-05-07 VITALS
HEART RATE: 104 BPM | BODY MASS INDEX: 28.32 KG/M2 | SYSTOLIC BLOOD PRESSURE: 128 MMHG | HEIGHT: 66 IN | OXYGEN SATURATION: 100 % | WEIGHT: 176.2 LBS | DIASTOLIC BLOOD PRESSURE: 90 MMHG | RESPIRATION RATE: 16 BRPM | TEMPERATURE: 98.1 F

## 2020-05-07 LAB
ALT SERPL W P-5'-P-CCNC: 155 U/L (ref 0–50)
ANION GAP SERPL CALCULATED.3IONS-SCNC: 4 MMOL/L (ref 3–14)
AST SERPL W P-5'-P-CCNC: 60 U/L (ref 0–45)
BUN SERPL-MCNC: 11 MG/DL (ref 7–30)
CALCIUM SERPL-MCNC: 7.9 MG/DL (ref 8.5–10.1)
CHLORIDE SERPL-SCNC: 108 MMOL/L (ref 94–109)
CO2 SERPL-SCNC: 26 MMOL/L (ref 20–32)
CREAT SERPL-MCNC: 0.67 MG/DL (ref 0.52–1.04)
ERYTHROCYTE [DISTWIDTH] IN BLOOD BY AUTOMATED COUNT: 16.2 % (ref 10–15)
GFR SERPL CREATININE-BSD FRML MDRD: >90 ML/MIN/{1.73_M2}
GLUCOSE SERPL-MCNC: 95 MG/DL (ref 70–99)
HCT VFR BLD AUTO: 32.7 % (ref 35–47)
HGB BLD-MCNC: 10.2 G/DL (ref 11.7–15.7)
MCH RBC QN AUTO: 29.6 PG (ref 26.5–33)
MCHC RBC AUTO-ENTMCNC: 31.2 G/DL (ref 31.5–36.5)
MCV RBC AUTO: 95 FL (ref 78–100)
PLATELET # BLD AUTO: 177 10E9/L (ref 150–450)
POTASSIUM SERPL-SCNC: 4.6 MMOL/L (ref 3.4–5.3)
RBC # BLD AUTO: 3.45 10E12/L (ref 3.8–5.2)
SODIUM SERPL-SCNC: 138 MMOL/L (ref 133–144)
WBC # BLD AUTO: 17.6 10E9/L (ref 4–11)

## 2020-05-07 PROCEDURE — 36415 COLL VENOUS BLD VENIPUNCTURE: CPT | Performed by: OBSTETRICS & GYNECOLOGY

## 2020-05-07 PROCEDURE — 84450 TRANSFERASE (AST) (SGOT): CPT | Performed by: OBSTETRICS & GYNECOLOGY

## 2020-05-07 PROCEDURE — 25000132 ZZH RX MED GY IP 250 OP 250 PS 637: Performed by: OBSTETRICS & GYNECOLOGY

## 2020-05-07 PROCEDURE — 84460 ALANINE AMINO (ALT) (SGPT): CPT | Performed by: OBSTETRICS & GYNECOLOGY

## 2020-05-07 PROCEDURE — 85027 COMPLETE CBC AUTOMATED: CPT | Performed by: OBSTETRICS & GYNECOLOGY

## 2020-05-07 PROCEDURE — 80048 BASIC METABOLIC PNL TOTAL CA: CPT | Performed by: OBSTETRICS & GYNECOLOGY

## 2020-05-07 RX ORDER — NIFEDIPINE 30 MG/1
30 TABLET, EXTENDED RELEASE ORAL DAILY
Qty: 90 TABLET | Refills: 0 | Status: SHIPPED | OUTPATIENT
Start: 2020-05-07 | End: 2020-06-25

## 2020-05-07 RX ORDER — NIFEDIPINE 30 MG/1
30 TABLET, EXTENDED RELEASE ORAL DAILY
Qty: 90 TABLET | Refills: 0 | Status: SHIPPED | OUTPATIENT
Start: 2020-05-07 | End: 2020-05-18

## 2020-05-07 RX ORDER — OXYCODONE HYDROCHLORIDE 5 MG/1
5 TABLET ORAL EVERY 6 HOURS PRN
Qty: 15 TABLET | Refills: 0 | Status: SHIPPED | OUTPATIENT
Start: 2020-05-07 | End: 2020-05-18

## 2020-05-07 RX ORDER — IBUPROFEN 800 MG/1
800 TABLET, FILM COATED ORAL EVERY 8 HOURS PRN
Qty: 60 TABLET | Refills: 1 | Status: SHIPPED | OUTPATIENT
Start: 2020-05-07 | End: 2020-06-12

## 2020-05-07 RX ORDER — DOCUSATE SODIUM 100 MG/1
100 CAPSULE, LIQUID FILLED ORAL 2 TIMES DAILY
Qty: 60 CAPSULE | Refills: 0 | Status: SHIPPED | OUTPATIENT
Start: 2020-05-07 | End: 2020-06-12

## 2020-05-07 RX ADMIN — PRENATAL VITAMINS-IRON FUMARATE 27 MG IRON-FOLIC ACID 0.8 MG TABLET 1 TABLET: at 09:33

## 2020-05-07 RX ADMIN — OXYCODONE HYDROCHLORIDE 5 MG: 5 TABLET ORAL at 08:22

## 2020-05-07 RX ADMIN — OXYCODONE HYDROCHLORIDE 5 MG: 5 TABLET ORAL at 04:18

## 2020-05-07 RX ADMIN — SENNOSIDES AND DOCUSATE SODIUM 2 TABLET: 8.6; 5 TABLET ORAL at 09:33

## 2020-05-07 RX ADMIN — SIMETHICONE CHEW TAB 80 MG 80 MG: 80 TABLET ORAL at 09:38

## 2020-05-07 RX ADMIN — OXYCODONE HYDROCHLORIDE 5 MG: 5 TABLET ORAL at 11:38

## 2020-05-07 RX ADMIN — ACETAMINOPHEN 975 MG: 325 TABLET, FILM COATED ORAL at 09:33

## 2020-05-07 RX ADMIN — FERROUS SULFATE TAB 325 MG (65 MG ELEMENTAL FE) 325 MG: 325 (65 FE) TAB at 09:33

## 2020-05-07 RX ADMIN — ACETAMINOPHEN 975 MG: 325 TABLET, FILM COATED ORAL at 04:18

## 2020-05-07 RX ADMIN — FERROUS SULFATE TAB 325 MG (65 MG ELEMENTAL FE) 325 MG: 325 (65 FE) TAB at 01:04

## 2020-05-07 RX ADMIN — NIFEDIPINE 30 MG: 30 TABLET, FILM COATED, EXTENDED RELEASE ORAL at 09:34

## 2020-05-07 NOTE — DISCHARGE SUMMARY
35 y/o  2 s/p 1CS POD # 3    Hemoglobin   Date Value Ref Range Status   2020 10.2 (L) 11.7 - 15.7 g/dL Final   ]    d/c home   On  colace, breast pump and procardia oxycodone   Follow-up in 1-2 weeks for post op check   Follow-up in 6 weeks for post partum examination    [facility]   Obstetrics Post-Op / Progress Note         Assessment and Plan:    Assessment:   Post-operative day #3  Low transverse primary  section  L&D complications: HELLP      Doing well.  Pain well-controlled.  Procardia  For bp control  Placenta with acute chorioamnionitis       Plan:   Ambulation encouraged  Discharge later today  Follow up 1 week for CS and bp check            Interval History:     Doing well.  Pain is well-controlled.  No fevers.  No history of wound drainage, warmth or significant erythema.  Good appetite.  Denies chest pain, shortness of breath, nausea or vomiting.  Ambulatory.  Breastfeeding well.          Significant Problems:    None          Review of Systems:    CONSTITUTIONAL: NEGATIVE for fever, chills, change in weight  INTEGUMENTARY/SKIN: NEGATIVE for worrisome rashes, moles or lesions  EYES: NEGATIVE for vision changes or irritation  ENT/MOUTH: NEGATIVE for ear, mouth and throat problems  RESP: NEGATIVE for significant cough or SOB  BREAST: NEGATIVE for masses, tenderness or discharge  CV: NEGATIVE for chest pain, palpitations or peripheral edema  GI: NEGATIVE for nausea, abdominal pain, heartburn, or change in bowel habits  : NEGATIVE for frequency, dysuria, or hematuria  MUSCULOSKELETAL: NEGATIVE for significant arthralgias or myalgia  NEURO: NEGATIVE for weakness, dizziness or paresthesias  ENDOCRINE: NEGATIVE for temperature intolerance, skin/hair changes  HEME: NEGATIVE for bleeding problems  PSYCHIATRIC: NEGATIVE for changes in mood or affect          Medications:   All medications related to the patient's surgery have been reviewed  -          Physical Exam:     All  vitals stable  Patient Vitals for the past 8 hrs:   BP Temp Temp src Heart Rate Resp   05/07/20 0800 (!) 128/90 98.1  F (36.7  C) Oral 104 16   05/07/20 0500 (!) 141/86 -- -- 100 16     Wound clean and dry with minimal or no drainage.  Surrounding skin with minimal erythema.          Data:     All laboratory data related to this surgery reviewed  Hemoglobin   Date Value Ref Range Status   05/07/2020 10.2 (L) 11.7 - 15.7 g/dL Final   05/06/2020 11.6 (L) 11.7 - 15.7 g/dL Final   05/05/2020 10.5 (L) 11.7 - 15.7 g/dL Final   05/05/2020 10.5 (L) 11.7 - 15.7 g/dL Final   05/04/2020 10.3 (L) 11.7 - 15.7 g/dL Final     -    Becca Zepeda, DO      Dr. Becca Zepeda, DO    OB/GYN   [facility] and [facility]

## 2020-05-07 NOTE — PROGRESS NOTES
[facility]   Obstetrics Post-Op / Progress Note         Assessment and Plan:    Assessment:   Post-operative day #3  Low transverse primary  section  L&D complications:  HELLP      Doing well.  Pain well-controlled.      Plan:   Ambulation encouraged  Discharge later today  bp better on procardia discharge home on procardia             Interval History:     Doing well.  Pain is well-controlled.  No fevers.  No history of wound drainage, warmth or significant erythema.  Good appetite.  Denies chest pain, shortness of breath, nausea or vomiting.  Ambulatory.  Breastfeeding well.          Significant Problems:    None          Review of Systems:    CONSTITUTIONAL: NEGATIVE for fever, chills, change in weight  INTEGUMENTARY/SKIN: NEGATIVE for worrisome rashes, moles or lesions  EYES: NEGATIVE for vision changes or irritation  ENT/MOUTH: NEGATIVE for ear, mouth and throat problems  RESP: NEGATIVE for significant cough or SOB  BREAST: NEGATIVE for masses, tenderness or discharge  CV: NEGATIVE for chest pain, palpitations or peripheral edema  GI: NEGATIVE for nausea, abdominal pain, heartburn, or change in bowel habits  : NEGATIVE for frequency, dysuria, or hematuria  MUSCULOSKELETAL: NEGATIVE for significant arthralgias or myalgia  NEURO: NEGATIVE for weakness, dizziness or paresthesias  ENDOCRINE: NEGATIVE for temperature intolerance, skin/hair changes  HEME: NEGATIVE for bleeding problems  PSYCHIATRIC: NEGATIVE for changes in mood or affect          Medications:   All medications related to the patient's surgery have been reviewed  -          Physical Exam:     All vitals stable  Patient Vitals for the past 8 hrs:   BP Temp Temp src Heart Rate Resp   20 0800 (!) 128/90 98.1  F (36.7  C) Oral 104 16   20 0500 (!) 141/86 -- -- 100 16     Wound clean and dry with minimal or no drainage.  Surrounding skin with minimal erythema.          Data:     All laboratory data related to this  surgery reviewed  Hemoglobin   Date Value Ref Range Status   05/07/2020 10.2 (L) 11.7 - 15.7 g/dL Final   05/06/2020 11.6 (L) 11.7 - 15.7 g/dL Final   05/05/2020 10.5 (L) 11.7 - 15.7 g/dL Final   05/05/2020 10.5 (L) 11.7 - 15.7 g/dL Final   05/04/2020 10.3 (L) 11.7 - 15.7 g/dL Final     -    DO Dr. Becca Mckinney, DO    OB/GYN   [facility] and [facility]

## 2020-05-07 NOTE — DISCHARGE INSTRUCTIONS
Follow up in 1 week for cs and bp check  Call [phone] for concerns    Call and ask to be seen or talk to a doctor for the following (You can go to the ob triage button on answering service line [phone]):        Increased bleeding, fever, general unwell feeling or increased pain.   Please call for any reason or concern!     Dr. Becca Zepeda, DO    OB/GYN   [facility] and [facility]      Postop  Birth Instructions  Lactation: [phone]    Activity       Do not lift more than 10 pounds for 6 weeks after surgery.  Ask family and friends for help when you need it.    No driving until you have stopped taking your pain medications (usually two weeks after surgery).    No heavy exercise or activity for 6 weeks.  Don't do anything that will put a strain on your surgery site.    Don't strain when using the toilet.  Your care team may prescribe a stool softener if you have problems with your bowel movements.     To care for your incision:       Keep the incision clean and dry.    Do not soak your incision in water. No swimming or hot tubs until it has fully healed. You may soak in the bathtub if the water level is below your incision.    Do not use peroxide, gel, cream, lotion, or ointment on your incision.    Adjust your clothes to avoid pressure on your surgery site (check the elastic in your underwear for example).     You may see a small amount of clear or pink drainage and this is normal.  Check with your health care provider:       If the drainage increases or has an odor.    If the incision reddens, you have swelling, or develop a rash.    If you have increased pain and the medicine we prescribed doesn't help.    If you have a fever above 100.4 F (38 C) with or without chills when placing thermometer under your tongue.   The area around your incision (surgery wound), will feel numb.  This is normal. The numbness should go away in less than a year.     Keep your hands  clean:  Always wash your hands before touching your incision (surgery wound). This helps reduce your risk of infection. If your hands aren't dirty, you may use an alcohol hand-rub to clean your hands. Keep your nails clean and short.    Call your healthcare provider if you have any of these symptoms:       You soak a sanitary pad with blood within 1 hour, or you see blood clots larger than a golf ball.    Bleeding that lasts more than 6 weeks.    Vaginal discharge that smells bad.    Severe pain, cramping or tenderness in your lower belly area.    A need to urinate more frequently (use the toilet more often), more urgently (use the toilet very quickly), or it burns when you urinate.    Nausea and vomiting.    Redness, swelling or pain around a vein in your leg.    Problems breastfeeding or a red or painful area on your breast.    Chest pain and cough or are gasping for air.    Problems with coping with sadness, anxiety or depression. If you have concerns about hurting yourself or the baby, call your provider immediately.      You have questions or concerns after you return home.     Preeclampsia   Call your doctor right away if you have any of the following:  - Edema (swelling) in your face or hands  - Rapid weight gain-about 1 pound or more in a day  - Headache  - Abdominal pain on your right side  - Vision problems (flashes or spots)  - You have questions or concerns once you return home.

## 2020-05-07 NOTE — PLAN OF CARE
Vital signs stable on room air. Bonding well with infant.  at bedside and supportive. Ambulating independently. Voiding spontaneously. Incision well approximated with liquid bandage. Breastfeeding well with some assistance from staff. Pt initiated pumping to help stimulate breast milk production due to supplementing. Tolerating a regular diet. Pain adequately controled with tylenol and oxycodone.

## 2020-05-07 NOTE — PLAN OF CARE
Dr and patient agree on the plan to discharge to home. Follow up appointments on the discharge instructions.

## 2020-05-07 NOTE — PLAN OF CARE
Data: Vital signs within normal limits. Denies HA, visual disturbances, epi gastric pain, Nausea or vomiting. Reflexes are hypo reactive and there is no clonus.  Postpartum checks within normal limits - see flow record. Patient eating and drinking normally. Patient able to empty bladder independently and is up ambulating. No apparent signs of infection. Patient performing self cares, is able to care for infant and is breast feeding every 2-3 hours. Supplementing with formula and pumping.  Incision appears within normal. Is using Oxycodone, Ibuprofen, and Tylenol for pain in the incisional  discomfort.  Rested in bed this shift.  Action: Patient medicated during the shift for pain. See MAR. Patient reassessed within 1 hour after each medication and pain was improved - patient stated she was comfortable. Patient education done, see flow record.  Response: Appropriate attachment behaviors observed with infant. Patient's  present this shift.   Plan: Continue current plan of care.  Anticipate discharge today provided Pediatrics is ok with baby going.

## 2020-05-07 NOTE — LACTATION NOTE
This note was copied from a baby's chart.  LC follow up prior to discharge. Infant has been requiring formula supplementation.  Vicky has pumped occasionally but her pump settings were incorrect. LC reviewed proper pump settings and setup, the importance of nursing and pumping to stimulate full milk production, and encouraged supplementation up to 1 oz every 2-3 hours for the 24 hours. They may adjust volumes based on pumping volumes available and increase volume with infant need over the next week.  All questions were answered.

## 2020-05-07 NOTE — PLAN OF CARE
Discharge instructions given. Questions answered. Patient instructed not to take Ibuprofen and to take Procardia per a phone call from Dr Zepeda. Patient and  were comfortable with the plan. Follow up appointments reviewed. Bands verified. Patient and baby discharge to home.

## 2020-05-07 NOTE — PLAN OF CARE
Assumed care at [id number]. Bonding well with baby. Ambulating independently, voiding without difficulty. Denies headache, dizziness, SOB, chest pain, and changes in vision. Tylenol and oxycodone for pain management. Bps slightly elevated 140s/80s. Q4 Bps for pre-eclampsia. Pt pumping for milk supply stimulus.

## 2020-05-08 ENCOUNTER — TELEPHONE (OUTPATIENT)
Dept: OBGYN | Facility: CLINIC | Age: 35
End: 2020-05-08

## 2020-05-08 NOTE — TELEPHONE ENCOUNTER
Pt calling with rash that has spread since discharge that is becoming itchy.  OK to take benadryl, call if this worsens.    Also wondering about her Procardia from discharge meds.  Her pharmacy states they do not have this on file when she called.      I called pharmacy, it was sent to 2 different pharmacies.  Krista resent the request through insurance and is now able to fill the med.    Pt notified and advised to  today and take ASAP as she has not had any antihypertensives since discharge.    Aline Silva RN

## 2020-05-08 NOTE — RESULT ENCOUNTER NOTE
We will further review this result with the patient when we see her for a postop visit  Booker Aaron MD FACOG

## 2020-05-10 LAB
BACTERIA SPEC CULT: NO GROWTH
Lab: NORMAL
SPECIMEN SOURCE: NORMAL

## 2020-05-18 ENCOUNTER — PRENATAL OFFICE VISIT (OUTPATIENT)
Dept: OBGYN | Facility: CLINIC | Age: 35
End: 2020-05-18
Payer: COMMERCIAL

## 2020-05-18 VITALS
DIASTOLIC BLOOD PRESSURE: 78 MMHG | WEIGHT: 154 LBS | BODY MASS INDEX: 24.75 KG/M2 | HEIGHT: 66 IN | SYSTOLIC BLOOD PRESSURE: 110 MMHG

## 2020-05-18 DIAGNOSIS — Z48.89 POSTOPERATIVE VISIT: Primary | ICD-10-CM

## 2020-05-18 DIAGNOSIS — Z30.41 ENCOUNTER FOR SURVEILLANCE OF CONTRACEPTIVE PILLS: ICD-10-CM

## 2020-05-18 DIAGNOSIS — Z87.59 HISTORY OF HEMOLYSIS, ELEVATED LIVER ENZYMES, AND LOW PLATELET (HELLP) SYNDROME: ICD-10-CM

## 2020-05-18 PROCEDURE — 99024 POSTOP FOLLOW-UP VISIT: CPT | Performed by: OBSTETRICS & GYNECOLOGY

## 2020-05-18 PROCEDURE — 84460 ALANINE AMINO (ALT) (SGPT): CPT | Performed by: OBSTETRICS & GYNECOLOGY

## 2020-05-18 PROCEDURE — 36415 COLL VENOUS BLD VENIPUNCTURE: CPT | Performed by: OBSTETRICS & GYNECOLOGY

## 2020-05-18 PROCEDURE — 84450 TRANSFERASE (AST) (SGOT): CPT | Performed by: OBSTETRICS & GYNECOLOGY

## 2020-05-18 ASSESSMENT — MIFFLIN-ST. JEOR: SCORE: 1415.29

## 2020-05-18 NOTE — PROGRESS NOTES
Incision check    Ms.Melissa Rubalcava 34 year old presents for incision check s/p 2 weeks from her primary  section.   She is s/p PLTCS on 2020 for arrest of descent. Uncomplicated PLTCS. Her intrapartum and postpartum course was also complicated by pre-eclampsia with severe features and HELLP.   She reports she has been recovering well. Denies any incisional drainage, bleeding. Reports adequate pain control on Tylenol PO. Has been told to avoid NSAIDs due to HELLP syndrome. Stopped taking the oxycodone prescribed to her due to ensuing itching and rash that developed.   Otherwise, reports normal bladder and bowel habits.   She is breast feeding but her breast milk output is low and has had to supplement feeds with formula. She think that she will be weaning pretty soon off of breast feeding.   Interested in birth control prescription.     Past Medical History:   Diagnosis Date     ASCUS with positive high risk HPV     + HPV 16 colp - CANDIE I     Cervical high risk HPV (human papillomavirus) test positive , ,,,16,17-     Contact dermatitis and other eczema, due to unspecified cause      HELLP (hemolytic anemia/elev liver enzymes/low platelets in pregnancy) 2020    and chorio on placenta path - thick mec at C section     LSIL (low grade squamous intraepithelial lesion) on Pap smear , 10/13, ,     +high risk HPV, CANDIE 1 on pathology x 3     Mild dysplasia of cervix 2019    repeat pap with HPV in one year     Rheumatoid arthritis of multiple sites with negative rheumatoid factor (H) 2004    Senior year of high school and first year of college and no symptoms since then.     Past Surgical History:   Procedure Laterality Date      SECTION N/A 2020      Tom Molina MD;  thick mec and chorio on placental path      COLPOSCOPY  2019    mild dysplasia with L Mcmanus - repeat pap with HPV in one year     Current Outpatient Medications  "  Medication     ferrous sulfate (FEROSUL) 325 (65 Fe) MG tablet     NIFEdipine ER OSMOTIC (PROCARDIA XL) 30 MG 24 hr tablet     norethindrone-ethinyl estradiol (ORTHO-NOVUM 7/7/7) 0.5/0.75/1-35 MG-MCG tablet     order for DME     Prenatal Vit-Fe Fumarate-FA (PRENATAL MULTIVITAMIN W/IRON) 27-0.8 MG tablet     docusate sodium (COLACE) 100 MG capsule     ibuprofen (ADVIL/MOTRIN) 800 MG tablet     No current facility-administered medications for this visit.         Allergies   Allergen Reactions     Oxycodone Rash     Amoxicillin      C: NEGATIVE for fever, chills, change in weight  HEENT: NEGATIVE for visual changes, runny nose, epistaxis, ear pain, tinnitus, tooth ache, sore throat, difficulty with swallowing, sinus pain  R: NEGATIVE for significant cough or SOB  CV: NEGATIVE for chest pain, palpitations or peripheral edema  BREAST: NEGATIVE For soreness, pain, lumps or nipple discharge  GI: NEGATIVE for nausea, abdominal pain, heartburn, or change in bowel habits  : NEGATIVE for frequency, dysuria, hematuria, vaginal discharge, or irregular bleeding  Neuro: NEGATIVE for numbness, tingling, focal weakness, or headache  INT: NEGATIVE for rashes, lesions or pruritis   PSYCH: NEGATIVE for anxiety, depression, or halluciinations    Exam:   My medical assistant, Deborah Huff CMA, was present as a chaperone for entire clinic visit including the physical exam.  /78 (BP Location: Left arm, Patient Position: Chair, Cuff Size: Adult Regular)   Ht 1.676 m (5' 6\")   Wt 69.9 kg (154 lb)   LMP 07/20/2019   Breastfeeding Yes   BMI 24.86 kg/m    General Appearance: Well nourished, well developed female, NAD, AOx3  Neurological: Mental Status Normal and Station and Gait Normal   Skin: Normal skin turgor  HEET: Atraumatic, normocephalic, EOMI  Neck: Supple,no adenopathy,thyroid normal  Lungs: Good respiratory effort  Abdomen: Soft, NT, ND, no masses. Incision is well healed.   Pelvic: deferred   Extremities: No " clubbing, no cyanosis and no edema     A/P: Incision check  -- reassurance provided to patient that incision is adequately healed  -- lifting restrictions still active until 6 weeks postpartum    Contraception  -- combined OCPs prescribed  -- in light of her admission to weaning of breast feeding, this is suitable birth control method to initiate; patient is aware of effects on breast mild output and is agreement with prescription    Hx of HELLP  -- last LFTs checked were still elevated but down trending  -- will repeat LFTs (AST and ALT) to ensure that they have normalized    Tom Molina MD  [facility]

## 2020-05-18 NOTE — NURSING NOTE
"Chief Complaint   Patient presents with     Postpartum Care      del by C/S Hellp syndrome       Initial /78 (BP Location: Left arm, Patient Position: Chair, Cuff Size: Adult Regular)   Ht 1.676 m (5' 6\")   Wt 69.9 kg (154 lb)   LMP 2019   Breastfeeding Yes   BMI 24.86 kg/m   Estimated body mass index is 24.86 kg/m  as calculated from the following:    Height as of this encounter: 1.676 m (5' 6\").    Weight as of this encounter: 69.9 kg (154 lb).  BP completed using cuff size: regular    Questioned patient about current smoking habits.  Pt. has never smoked.          The following HM Due: NONE    Whit Harper CMA    "

## 2020-05-19 LAB
ALT SERPL W P-5'-P-CCNC: 35 U/L (ref 0–50)
AST SERPL W P-5'-P-CCNC: 20 U/L (ref 0–45)

## 2020-05-31 ENCOUNTER — MYC MEDICAL ADVICE (OUTPATIENT)
Dept: OBGYN | Facility: CLINIC | Age: 35
End: 2020-05-31

## 2020-05-31 ENCOUNTER — NURSE TRIAGE (OUTPATIENT)
Dept: NURSING | Facility: CLINIC | Age: 35
End: 2020-05-31

## 2020-05-31 NOTE — TELEPHONE ENCOUNTER
Susan is 4 weeks post partum.  Now is having heavy bleeding and large clots.  Bleeding started two days ago.  One pad per hour had just started.  Susan will keep watch and monitor and will phone back if bleeding continues.  Susan denies dizziness and denies abdominal pain.      Additional Information    Negative: Shock suspected (e.g., cold/pale/clammy skin, too weak to stand, low BP, rapid pulse)    Negative: Difficult to awaken or acting confused (e.g., disoriented, slurred speech)    Negative: Passed out (i.e., lost consciousness, collapsed and was not responding)    Negative: Sounds like a life-threatening emergency to the triager    Negative: SEVERE abdominal pain    Negative: SEVERE dizziness (e.g., unable to stand, requires support to walk, feels like passing out now)    Negative: SEVERE vaginal bleeding (i.e., soaking 2 pads or tampons per hour and present 2 or more hours; 1 menstrual cup every 2 hours)    Negative: Patient sounds very sick or weak to the triager    Negative: MODERATE vaginal bleeding (i.e., soaking 1 pad or tampon per hour and present > 6 hours; 1 menstrual cup every 6 hours)    Negative: [1] Constant abdominal pain AND [2] present > 2 hours    Negative: Pale skin (pallor) of new onset or worsening    Negative: Passed tissue (e.g., gray-white)    Negative: Taking Coumadin (warfarin) or other strong blood thinner, or known bleeding disorder (e.g., thrombocytopenia)    Negative: [1] Skin bruises or nosebleed AND [2] not caused by an injury    Negative: [1] Periods with > 6 soaked pads or tampons per day AND [2] last > 7 days    Negative: [1] Bleeding or spotting after procedure (e.g., biopsy) or pelvic examination (e.g., pap smear) AND [2] lasts > 7 days    Negative: Periods with > 6 soaked pads or tampons per day    Negative: Periods last > 7 days    Negative: [1] Uses menstrual cups AND [2] more than 80 ml blood per menstrual period.    Negative: [1] Missed period AND [2] has occurred  2 or more times in the last year    Negative: [1] Menstrual cycle < 21 days OR > 35 days AND [2] occurs more than two cycles (2 months) this past year    Negative: [1] Bleeding or spotting between regular periods AND [2] occurs more than three cycles (3 months) this past year    Negative: [1] Bleeding or spotting between regular periods AND [2] occurs more than three cycles (3 months) AND [3] using birth control medicine (pills, patch, Depo-Provera, Implanon, vaginal ring, Mirena IUD)    Negative: Bleeding or spotting occurs after hysterectomy    Negative: Bleeding or spotting occurs after sex (Exception: first intercourse)    Normal menstrual flow    Protocols used: VAGINAL BLEEDING - [id number]

## 2020-06-01 NOTE — TELEPHONE ENCOUNTER
Please address the my chart message. See also the TE from the nurse advisors.     HORACIO Ward RN

## 2020-06-08 ENCOUNTER — MYC MEDICAL ADVICE (OUTPATIENT)
Dept: OBGYN | Facility: CLINIC | Age: 35
End: 2020-06-08

## 2020-06-08 DIAGNOSIS — N93.9 ABNORMAL UTERINE BLEEDING (AUB): Primary | ICD-10-CM

## 2020-06-08 DIAGNOSIS — N93.9 ABNORMAL UTERINE BLEEDING (AUB): ICD-10-CM

## 2020-06-08 LAB
BASOPHILS # BLD AUTO: 0 10E9/L (ref 0–0.2)
BASOPHILS NFR BLD AUTO: 0.3 %
DIFFERENTIAL METHOD BLD: ABNORMAL
EOSINOPHIL # BLD AUTO: 0.1 10E9/L (ref 0–0.7)
EOSINOPHIL NFR BLD AUTO: 1.6 %
ERYTHROCYTE [DISTWIDTH] IN BLOOD BY AUTOMATED COUNT: 13.9 % (ref 10–15)
HCT VFR BLD AUTO: 34.4 % (ref 35–47)
HGB BLD-MCNC: 10.7 G/DL (ref 11.7–15.7)
LYMPHOCYTES # BLD AUTO: 2.4 10E9/L (ref 0.8–5.3)
LYMPHOCYTES NFR BLD AUTO: 30.2 %
MCH RBC QN AUTO: 29.6 PG (ref 26.5–33)
MCHC RBC AUTO-ENTMCNC: 31.1 G/DL (ref 31.5–36.5)
MCV RBC AUTO: 95 FL (ref 78–100)
MONOCYTES # BLD AUTO: 0.6 10E9/L (ref 0–1.3)
MONOCYTES NFR BLD AUTO: 7.1 %
NEUTROPHILS # BLD AUTO: 4.8 10E9/L (ref 1.6–8.3)
NEUTROPHILS NFR BLD AUTO: 60.8 %
PLATELET # BLD AUTO: 371 10E9/L (ref 150–450)
RBC # BLD AUTO: 3.62 10E12/L (ref 3.8–5.2)
WBC # BLD AUTO: 7.9 10E9/L (ref 4–11)

## 2020-06-08 PROCEDURE — 36415 COLL VENOUS BLD VENIPUNCTURE: CPT | Performed by: OBSTETRICS & GYNECOLOGY

## 2020-06-08 PROCEDURE — 85025 COMPLETE CBC W/AUTO DIFF WBC: CPT | Performed by: OBSTETRICS & GYNECOLOGY

## 2020-06-08 NOTE — TELEPHONE ENCOUNTER
Patient spoke with  today and he told her to make an appointment with . tomorrow. I do not see that he is scheduled. Please call patient today.

## 2020-06-08 NOTE — TELEPHONE ENCOUNTER
I spoke with the patient today in regards to heavy bleeding.  The patient had been taking 2 oral contraceptive pills twice daily to control her heavy flow and actually stopped bleeding.  She decreased to 1 tablet daily and her bleeding returned.  At present she denies any lightheadedness dizziness or other related vasovagal symptoms.  She did resume to birth control pills this morning and is asking what to do.  She denies fevers chills nausea vomiting or other symptoms of endocrinopathy.  She denies significant cramping or discomfort.  I have asked the patient to come into the lab to have a CBC with differential drawn.  She is going to resume taking 2  birth control pills twice daily and be seen in the office in the morning.  If she has flow greater than a pad an hour vasovagal symptoms fevers chills or symptoms of concern she will call and be evaluated immediately.  The patient is comfortable with this plan.  I think she has a good understanding   Please contact the patient and assist her in making an appointment

## 2020-06-09 ENCOUNTER — TELEPHONE (OUTPATIENT)
Dept: OBGYN | Facility: CLINIC | Age: 35
End: 2020-06-09

## 2020-06-09 ENCOUNTER — OFFICE VISIT (OUTPATIENT)
Dept: OBGYN | Facility: CLINIC | Age: 35
End: 2020-06-09
Payer: COMMERCIAL

## 2020-06-09 VITALS
HEIGHT: 66 IN | SYSTOLIC BLOOD PRESSURE: 104 MMHG | BODY MASS INDEX: 24.48 KG/M2 | DIASTOLIC BLOOD PRESSURE: 62 MMHG | WEIGHT: 152.3 LBS

## 2020-06-09 DIAGNOSIS — D50.9 IRON DEFICIENCY ANEMIA, UNSPECIFIED IRON DEFICIENCY ANEMIA TYPE: Primary | ICD-10-CM

## 2020-06-09 PROCEDURE — 99214 OFFICE O/P EST MOD 30 MIN: CPT | Performed by: FAMILY MEDICINE

## 2020-06-09 ASSESSMENT — MIFFLIN-ST. JEOR: SCORE: 1407.58

## 2020-06-09 NOTE — TELEPHONE ENCOUNTER
Patient scheduled for pelvic ultrasound at [facility] on 6/11/2020 at 8:30am. Pt advised to ultrasound date and time. Pt advised to have full bladder.    Gena Biswas CMA

## 2020-06-09 NOTE — PATIENT INSTRUCTIONS
Continue Oral Contraceptive taper @ 2 tablets BID for a week, then 2 x 1 weeks and then decrease to one daily x 3 weeks, if no retained placenta is found.  Schedule ultrasound

## 2020-06-09 NOTE — PROGRESS NOTES
SUBJECTIVE:  uSsan Rubalcava is an 34 year old   woman who presents for   Obstetrics/gynecology consult for post partum bleeding heavy, had heavy bleeding 2 weeks post partum was started on Oral Contraceptive, 3 days later had re-bleed. Called nurse line and was advised to observe it.    Was then placed on Oral Contraceptive taper until stopped bleeding and was down to 1 pill daily and 3 days into that   Started bleeding heavier, now is back on Oral Contraceptive taper, which has helped.        Past Medical History:   Diagnosis Date     ASCUS with positive high risk HPV     + HPV 16 colp - CANDIE I     Cervical high risk HPV (human papillomavirus) test positive , ,,,,-     Contact dermatitis and other eczema, due to unspecified cause      HELLP (hemolytic anemia/elev liver enzymes/low platelets in pregnancy) 2020    and chorio on placenta path - thick mec at C section     LSIL (low grade squamous intraepithelial lesion) on Pap smear , 10/13, ,     +high risk HPV, CANDIE 1 on pathology x 3     Mild dysplasia of cervix 2019    repeat pap with HPV in one year     Rheumatoid arthritis of multiple sites with negative rheumatoid factor (H) 2004    Senior year of high school and first year of college and no symptoms since then.          Family History   Problem Relation Age of Onset     Thyroid Disease Mother      Prostate Cancer Father      Prostate Problems Father 61        Prostate Cancer     Breast Cancer Maternal Grandmother      Diabetes Maternal Grandfather         in70's     Heart Disease Paternal Grandmother      Rheumatoid Arthritis Paternal Grandmother      Cerebrovascular Disease Paternal Grandfather      Alzheimer Disease Paternal Grandfather      Mental Illness Cousin      C.A.D. No family hx of      Cancer - colorectal No family hx of        Past Surgical History:   Procedure Laterality Date      SECTION N/A 2020      Tom Molina  "MD Mansi;  thick mec and chorio on placental path      COLPOSCOPY  06/04/2019    mild dysplasia with L Mcmanus - repeat pap with HPV in one year       Current Outpatient Medications   Medication     NIFEdipine ER OSMOTIC (PROCARDIA XL) 30 MG 24 hr tablet     norethindrone-ethinyl estradiol (ORTHO-NOVUM 7/7/7) 0.5/0.75/1-35 MG-MCG tablet     docusate sodium (COLACE) 100 MG capsule     ferrous sulfate (FEROSUL) 325 (65 Fe) MG tablet     ibuprofen (ADVIL/MOTRIN) 800 MG tablet     order for DME     Prenatal Vit-Fe Fumarate-FA (PRENATAL MULTIVITAMIN W/IRON) 27-0.8 MG tablet     No current facility-administered medications for this visit.      Allergies   Allergen Reactions     Oxycodone Rash     Amoxicillin        Social History     Tobacco Use     Smoking status: Never Smoker     Smokeless tobacco: Never Used   Substance Use Topics     Alcohol use: Not Currently       Review Of Systems  Ears/Nose/Throat: negative  Respiratory: No shortness of breath, dyspnea on exertion, cough, or hemoptysis  Cardiovascular: negative  Gastrointestinal: negative  Genitourinary: negative  Constitutional, HEENT, cardiovascular, pulmonary, GI, , musculoskeletal, neuro, skin, endocrine and psych systems are negative, except as otherwise noted.    OBJECTIVE:  /62   Ht 1.676 m (5' 6\")   Wt 69.1 kg (152 lb 4.8 oz)   LMP 07/20/2019   BMI 24.58 kg/m    General appearance: healthy, alert and no distress  EYES EOMI, intact visual fields   HENT: Normocephalic.   NECK: no adenopathy, no asymmetry, masses, or scars   Lungs: negative, Percussion normal. Good diaphragmatic excursion. Lungs clear  Heart: negative, PMI normal. No lifts, heaves, or thrills. RRR. No murmurs, clicks gallops or rub  Abdomen: Abdomen soft, non-tender. BS normal. No masses, organomegaly  SKIN: no ulcers, lesions or rash  NEURO: alert/oriented to person, location and time, CN 2-12 intact, brisk, strength 5/5 throughout and symmetric, sensation to light touch grossly " intact throughout  PSYCH: NEGATIVE  Pelvic: Pelvic examination with no pap/no Gonorrhea and Chlamydia  External genitalia normal   and vagina normal rugatted not atrophic -  Examination of urethra showed normal no masses, tenderness, scarring  bladder, no masses or tenderness  Cervix no lesions or discharge, scant amount of bleeding  Bimanual exam deferred     ASSESSMENT:  Susan Rubalcava is an 34 year old   woman who presents for   Obstetrics/gynecology consult for post partum bleeding heavy, had heavy bleeding 2 weeks post partum was started on Oral Contraceptive, 3 days later had re-bleed. Called nurse line and was advised to observe it.    Was then placed on Oral Contraceptive taper until stopped bleeding and was down to 1 pill daily and 3 days into that   Started bleeding heavier, now is back on Oral Contraceptive taper, which has helped.      PLAN:  Dx:  1)  Delayed post partum hemorrhage:  Pelvic ultrasound ordered, no dizziness, hemoglobin 10.3 yesterday   Rule out retained placenta, otherwise may be subinvolution of placenta site implantation which may resolve   With longer Oral Contraceptive taper.   When she had heavy bleeding, she changed a pad every 2-2.5 hours, (thicker pad).   And having clots quarter size-half dollar, about 4 at a time.    Continue Oral Contraceptive taper @ 2 tablets BID for a week, and then taper to one po bid x one week, and then one tablet daily if no retained placenta is found.  Discussed treatment with dilation and curettage in the event that hormones do not cure this, or if retained placenta is found.   2)  Anemia:  Mild, 10.3, Slo-fe iron prescribed        Dr. Becca Zepeda, DO    Obstetrics and Gynecology  [facility] - [place] [facility] [place]

## 2020-06-09 NOTE — NURSING NOTE
"Chief Complaint   Patient presents with     RECHECK      2020--abnormal bleeding--taking 4 birth control pill a day--bleeding is better       Initial /62   Ht 1.676 m (5' 6\")   Wt 69.1 kg (152 lb 4.8 oz)   LMP 2019   BMI 24.58 kg/m   Estimated body mass index is 24.58 kg/m  as calculated from the following:    Height as of this encounter: 1.676 m (5' 6\").    Weight as of this encounter: 69.1 kg (152 lb 4.8 oz).  BP completed using cuff size: regular    Questioned patient about current smoking habits.  Pt. has never smoked.          The following HM Due: NONE           "

## 2020-06-11 ENCOUNTER — TELEPHONE (OUTPATIENT)
Dept: OBGYN | Facility: CLINIC | Age: 35
End: 2020-06-11

## 2020-06-11 ENCOUNTER — PREP FOR PROCEDURE (OUTPATIENT)
Dept: OBGYN | Facility: CLINIC | Age: 35
End: 2020-06-11

## 2020-06-11 ENCOUNTER — MYC MEDICAL ADVICE (OUTPATIENT)
Dept: OBGYN | Facility: CLINIC | Age: 35
End: 2020-06-11

## 2020-06-11 ENCOUNTER — HOSPITAL ENCOUNTER (OUTPATIENT)
Dept: ULTRASOUND IMAGING | Facility: CLINIC | Age: 35
Discharge: HOME | End: 2020-06-11
Attending: FAMILY MEDICINE | Admitting: FAMILY MEDICINE
Payer: COMMERCIAL

## 2020-06-11 DIAGNOSIS — Z11.59 ENCOUNTER FOR SCREENING FOR OTHER VIRAL DISEASES: Primary | ICD-10-CM

## 2020-06-11 PROCEDURE — 76856 US EXAM PELVIC COMPLETE: CPT

## 2020-06-11 NOTE — TELEPHONE ENCOUNTER
Retained product of conception   Discussed with patient cytotec vs d & C recommend surgery patient  Agrees, will be scheduled.       Dr. Becca Zepeda, DO    Obstetrics and Gynecology  [facility] - [place] and [place]

## 2020-06-11 NOTE — TELEPHONE ENCOUNTER
Type of surgery: ultrasound guided suction dilation and curettage  Location of surgery: [facility]  Date and time of surgery: 6/15/20 @ 7:30 am  Surgeon: Dr. Zepeda  Pre-Op Appt Date: 6/10/20  Post-Op Appt Date: 6/25/20   Packet sent out: Yes  Pre-cert/Authorization completed:  No  Date: 6/11/20

## 2020-06-12 DIAGNOSIS — Z11.59 ENCOUNTER FOR SCREENING FOR OTHER VIRAL DISEASES: ICD-10-CM

## 2020-06-12 LAB
SARS-COV-2 RNA SPEC QL NAA+PROBE: NOT DETECTED
SPECIMEN SOURCE: NORMAL

## 2020-06-12 PROCEDURE — U0003 INFECTIOUS AGENT DETECTION BY NUCLEIC ACID (DNA OR RNA); SEVERE ACUTE RESPIRATORY SYNDROME CORONAVIRUS 2 (SARS-COV-2) (CORONAVIRUS DISEASE [COVID-19]), AMPLIFIED PROBE TECHNIQUE, MAKING USE OF HIGH THROUGHPUT TECHNOLOGIES AS DESCRIBED BY CMS-2020-01-R: HCPCS | Mod: 90 | Performed by: FAMILY MEDICINE

## 2020-06-12 PROCEDURE — 99000 SPECIMEN HANDLING OFFICE-LAB: CPT | Performed by: FAMILY MEDICINE

## 2020-06-12 PROCEDURE — 99207 ZZC NO BILLABLE SERVICE THIS VISIT: CPT

## 2020-06-12 NOTE — LETTER
June 14, 2020        Susan Rubalcava  [address] [place] [place] [address]    This letter provides a written record that you were tested for COVID-19 on 6/12/20.   Your result was negative.    This means that we didn t find the virus that causes COVID-19 in your sample. A test may show negative when you do actually have the virus. This can happen when the virus is in the early stages of infection, before you feel illness symptoms.    Even if you don t have symptoms, they may still appear. For safety, it s very important to follow these rules.    Keep yourself away from others (self-isolation):      Stay home. Don t go to work, school or anywhere else.     Stay in your own room (and use your own bathroom), if you can.    Stay away from others in your home. No hugging, kissing or shaking hands. No visitors.    Clean  high touch  surfaces often (doorknobs, counters, handles, etc.). Use a household cleaning spray or wipes.    Cover your mouth and nose with a mask, tissue or washcloth to avoid spreading germs.    Wash your hands and face often with soap and water.    Stay in self-isolation until you meet ALL of the guidelines below:    1. You have had no fever for at least 72 hours (that is 3 full days of no fever without the use of medicine that reduces fevers), AND  2. other symptoms (such as cough, shortness of breath) have gotten better, AND  3. at least 10 days have passed since your symptoms first appeared.    Going back to work  Check with your employer for any guidelines to follow for going back to work.    Employers: This document serves as formal notice that your employee tested negative for COVID-19, as of the testing date shown above.    For questions regarding this letter or your Negative COVID-19 result, call [phone] between 8A to 6:30P (M-F) and 10A to 6:30P (weekends).

## 2020-06-15 ENCOUNTER — HOSPITAL ENCOUNTER (OUTPATIENT)
Facility: CLINIC | Age: 35
Discharge: HOME | End: 2020-06-15
Attending: FAMILY MEDICINE | Admitting: FAMILY MEDICINE
Payer: COMMERCIAL

## 2020-06-15 ENCOUNTER — ANESTHESIA EVENT (OUTPATIENT)
Dept: SURGERY | Facility: CLINIC | Age: 35
End: 2020-06-15
Payer: COMMERCIAL

## 2020-06-15 ENCOUNTER — ANESTHESIA (OUTPATIENT)
Dept: SURGERY | Facility: CLINIC | Age: 35
End: 2020-06-15
Payer: COMMERCIAL

## 2020-06-15 ENCOUNTER — HOSPITAL ENCOUNTER (OUTPATIENT)
Dept: ULTRASOUND IMAGING | Facility: CLINIC | Age: 35
End: 2020-06-15
Attending: FAMILY MEDICINE | Admitting: FAMILY MEDICINE
Payer: COMMERCIAL

## 2020-06-15 VITALS
HEART RATE: 64 BPM | BODY MASS INDEX: 24.59 KG/M2 | WEIGHT: 153 LBS | DIASTOLIC BLOOD PRESSURE: 60 MMHG | TEMPERATURE: 97.7 F | HEIGHT: 66 IN | SYSTOLIC BLOOD PRESSURE: 103 MMHG | RESPIRATION RATE: 16 BRPM | OXYGEN SATURATION: 99 %

## 2020-06-15 DIAGNOSIS — Z98.890 S/P D&C (STATUS POST DILATION AND CURETTAGE): Primary | ICD-10-CM

## 2020-06-15 LAB
ABO + RH BLD: NORMAL
ABO + RH BLD: NORMAL
BLD GP AB SCN SERPL QL: NORMAL
BLOOD BANK CMNT PATIENT-IMP: NORMAL
CREAT SERPL-MCNC: 0.7 MG/DL (ref 0.52–1.04)
GFR SERPL CREATININE-BSD FRML MDRD: >90 ML/MIN/{1.73_M2}
HCG SERPL QL: NEGATIVE
HGB BLD-MCNC: 10.9 G/DL (ref 11.7–15.7)
POTASSIUM SERPL-SCNC: 3.6 MMOL/L (ref 3.4–5.3)
SPECIMEN EXP DATE BLD: NORMAL

## 2020-06-15 PROCEDURE — 59160 D & C AFTER DELIVERY: CPT | Performed by: FAMILY MEDICINE

## 2020-06-15 PROCEDURE — 84703 CHORIONIC GONADOTROPIN ASSAY: CPT | Performed by: ANESTHESIOLOGY

## 2020-06-15 PROCEDURE — 37000008 ZZH ANESTHESIA TECHNICAL FEE, 1ST 30 MIN: Performed by: FAMILY MEDICINE

## 2020-06-15 PROCEDURE — 37000009 ZZH ANESTHESIA TECHNICAL FEE, EACH ADDTL 15 MIN: Performed by: FAMILY MEDICINE

## 2020-06-15 PROCEDURE — 86901 BLOOD TYPING SEROLOGIC RH(D): CPT | Performed by: FAMILY MEDICINE

## 2020-06-15 PROCEDURE — 25000125 ZZHC RX 250: Performed by: FAMILY MEDICINE

## 2020-06-15 PROCEDURE — 40000305 ZZH STATISTIC PRE PROC ASSESS I: Performed by: FAMILY MEDICINE

## 2020-06-15 PROCEDURE — 82565 ASSAY OF CREATININE: CPT | Performed by: ANESTHESIOLOGY

## 2020-06-15 PROCEDURE — 25000132 ZZH RX MED GY IP 250 OP 250 PS 637: Performed by: FAMILY MEDICINE

## 2020-06-15 PROCEDURE — 71000027 ZZH RECOVERY PHASE 2 EACH 15 MINS: Performed by: FAMILY MEDICINE

## 2020-06-15 PROCEDURE — 76998 US GUIDE INTRAOP: CPT | Mod: 26 | Performed by: FAMILY MEDICINE

## 2020-06-15 PROCEDURE — 25000128 H RX IP 250 OP 636: Performed by: FAMILY MEDICINE

## 2020-06-15 PROCEDURE — 36415 COLL VENOUS BLD VENIPUNCTURE: CPT | Performed by: ANESTHESIOLOGY

## 2020-06-15 PROCEDURE — 25800030 ZZH RX IP 258 OP 636: Performed by: ANESTHESIOLOGY

## 2020-06-15 PROCEDURE — 71000012 ZZH RECOVERY PHASE 1 LEVEL 1 FIRST HR: Performed by: FAMILY MEDICINE

## 2020-06-15 PROCEDURE — 36000052 ZZH SURGERY LEVEL 2 EA 15 ADDTL MIN: Performed by: FAMILY MEDICINE

## 2020-06-15 PROCEDURE — 25000125 ZZHC RX 250: Performed by: NURSE ANESTHETIST, CERTIFIED REGISTERED

## 2020-06-15 PROCEDURE — 36415 COLL VENOUS BLD VENIPUNCTURE: CPT | Performed by: FAMILY MEDICINE

## 2020-06-15 PROCEDURE — 84132 ASSAY OF SERUM POTASSIUM: CPT | Performed by: ANESTHESIOLOGY

## 2020-06-15 PROCEDURE — 86850 RBC ANTIBODY SCREEN: CPT | Performed by: FAMILY MEDICINE

## 2020-06-15 PROCEDURE — 88305 TISSUE EXAM BY PATHOLOGIST: CPT | Performed by: FAMILY MEDICINE

## 2020-06-15 PROCEDURE — 36000050 ZZH SURGERY LEVEL 2 1ST 30 MIN: Performed by: FAMILY MEDICINE

## 2020-06-15 PROCEDURE — 25000125 ZZHC RX 250: Performed by: ANESTHESIOLOGY

## 2020-06-15 PROCEDURE — 40000985 US INTRAOPERATIVE: Mod: TC

## 2020-06-15 PROCEDURE — 76998 US GUIDE INTRAOP: CPT

## 2020-06-15 PROCEDURE — 25000128 H RX IP 250 OP 636: Performed by: NURSE ANESTHETIST, CERTIFIED REGISTERED

## 2020-06-15 PROCEDURE — 85018 HEMOGLOBIN: CPT | Performed by: FAMILY MEDICINE

## 2020-06-15 PROCEDURE — 88305 TISSUE EXAM BY PATHOLOGIST: CPT | Mod: 26 | Performed by: FAMILY MEDICINE

## 2020-06-15 PROCEDURE — 27210794 ZZH OR GENERAL SUPPLY STERILE: Performed by: FAMILY MEDICINE

## 2020-06-15 PROCEDURE — 86900 BLOOD TYPING SEROLOGIC ABO: CPT | Performed by: FAMILY MEDICINE

## 2020-06-15 PROCEDURE — 00000159 ZZHCL STATISTIC H-SEND OUTS PREP: Performed by: FAMILY MEDICINE

## 2020-06-15 PROCEDURE — 76499 UNLISTED DX RADIOGRAPHIC PX: CPT

## 2020-06-15 RX ORDER — FENTANYL CITRATE 50 UG/ML
INJECTION, SOLUTION INTRAMUSCULAR; INTRAVENOUS PRN
Status: DISCONTINUED | OUTPATIENT
Start: 2020-06-15 | End: 2020-06-15

## 2020-06-15 RX ORDER — GLYCOPYRROLATE 0.2 MG/ML
INJECTION, SOLUTION INTRAMUSCULAR; INTRAVENOUS PRN
Status: DISCONTINUED | OUTPATIENT
Start: 2020-06-15 | End: 2020-06-15

## 2020-06-15 RX ORDER — ONDANSETRON 2 MG/ML
4 INJECTION INTRAMUSCULAR; INTRAVENOUS EVERY 30 MIN PRN
Status: DISCONTINUED | OUTPATIENT
Start: 2020-06-15 | End: 2020-06-15 | Stop reason: HOSPADM

## 2020-06-15 RX ORDER — LABETALOL 20 MG/4 ML (5 MG/ML) INTRAVENOUS SYRINGE
10
Status: DISCONTINUED | OUTPATIENT
Start: 2020-06-15 | End: 2020-06-15 | Stop reason: HOSPADM

## 2020-06-15 RX ORDER — DEXAMETHASONE SODIUM PHOSPHATE 4 MG/ML
INJECTION, SOLUTION INTRA-ARTICULAR; INTRALESIONAL; INTRAMUSCULAR; INTRAVENOUS; SOFT TISSUE PRN
Status: DISCONTINUED | OUTPATIENT
Start: 2020-06-15 | End: 2020-06-15

## 2020-06-15 RX ORDER — PROPOFOL 10 MG/ML
INJECTION, EMULSION INTRAVENOUS CONTINUOUS PRN
Status: DISCONTINUED | OUTPATIENT
Start: 2020-06-15 | End: 2020-06-15

## 2020-06-15 RX ORDER — DOXYCYCLINE HYCLATE 100 MG
100 TABLET ORAL 2 TIMES DAILY
Qty: 14 TABLET | Refills: 0 | Status: SHIPPED | OUTPATIENT
Start: 2020-06-15 | End: 2020-06-25

## 2020-06-15 RX ORDER — LIDOCAINE 40 MG/G
CREAM TOPICAL
Status: DISCONTINUED | OUTPATIENT
Start: 2020-06-15 | End: 2020-06-15 | Stop reason: HOSPADM

## 2020-06-15 RX ORDER — SODIUM CHLORIDE, SODIUM LACTATE, POTASSIUM CHLORIDE, CALCIUM CHLORIDE 600; 310; 30; 20 MG/100ML; MG/100ML; MG/100ML; MG/100ML
INJECTION, SOLUTION INTRAVENOUS CONTINUOUS
Status: DISCONTINUED | OUTPATIENT
Start: 2020-06-15 | End: 2020-06-15 | Stop reason: HOSPADM

## 2020-06-15 RX ORDER — ONDANSETRON 4 MG/1
4 TABLET, ORALLY DISINTEGRATING ORAL EVERY 30 MIN PRN
Status: DISCONTINUED | OUTPATIENT
Start: 2020-06-15 | End: 2020-06-15 | Stop reason: HOSPADM

## 2020-06-15 RX ORDER — ONDANSETRON 2 MG/ML
INJECTION INTRAMUSCULAR; INTRAVENOUS PRN
Status: DISCONTINUED | OUTPATIENT
Start: 2020-06-15 | End: 2020-06-15

## 2020-06-15 RX ORDER — FENTANYL CITRATE 50 UG/ML
25-50 INJECTION, SOLUTION INTRAMUSCULAR; INTRAVENOUS
Status: DISCONTINUED | OUTPATIENT
Start: 2020-06-15 | End: 2020-06-15 | Stop reason: HOSPADM

## 2020-06-15 RX ORDER — ACETAMINOPHEN 325 MG/1
975 TABLET ORAL ONCE
Status: COMPLETED | OUTPATIENT
Start: 2020-06-15 | End: 2020-06-15

## 2020-06-15 RX ORDER — DOXYCYCLINE 100 MG/10ML
100 INJECTION, POWDER, LYOPHILIZED, FOR SOLUTION INTRAVENOUS
Status: COMPLETED | OUTPATIENT
Start: 2020-06-15 | End: 2020-06-15

## 2020-06-15 RX ORDER — KETOROLAC TROMETHAMINE 30 MG/ML
30 INJECTION, SOLUTION INTRAMUSCULAR; INTRAVENOUS ONCE
Status: COMPLETED | OUTPATIENT
Start: 2020-06-15 | End: 2020-06-15

## 2020-06-15 RX ORDER — HYDROCODONE BITARTRATE AND ACETAMINOPHEN 5; 325 MG/1; MG/1
1 TABLET ORAL EVERY 6 HOURS PRN
Qty: 12 TABLET | Refills: 0 | Status: SHIPPED | OUTPATIENT
Start: 2020-06-15 | End: 2020-06-25

## 2020-06-15 RX ORDER — PROPOFOL 10 MG/ML
INJECTION, EMULSION INTRAVENOUS PRN
Status: DISCONTINUED | OUTPATIENT
Start: 2020-06-15 | End: 2020-06-15

## 2020-06-15 RX ORDER — MEPERIDINE HYDROCHLORIDE 25 MG/ML
12.5 INJECTION INTRAMUSCULAR; INTRAVENOUS; SUBCUTANEOUS
Status: DISCONTINUED | OUTPATIENT
Start: 2020-06-15 | End: 2020-06-15 | Stop reason: HOSPADM

## 2020-06-15 RX ORDER — NALOXONE HYDROCHLORIDE 0.4 MG/ML
.1-.4 INJECTION, SOLUTION INTRAMUSCULAR; INTRAVENOUS; SUBCUTANEOUS
Status: DISCONTINUED | OUTPATIENT
Start: 2020-06-15 | End: 2020-06-15 | Stop reason: HOSPADM

## 2020-06-15 RX ADMIN — GLYCOPYRROLATE 0.2 MG: 0.2 INJECTION, SOLUTION INTRAMUSCULAR; INTRAVENOUS at 07:44

## 2020-06-15 RX ADMIN — KETOROLAC TROMETHAMINE 30 MG: 30 INJECTION, SOLUTION INTRAMUSCULAR at 07:09

## 2020-06-15 RX ADMIN — ACETAMINOPHEN 975 MG: 325 TABLET, FILM COATED ORAL at 07:09

## 2020-06-15 RX ADMIN — PROPOFOL 150 MCG/KG/MIN: 10 INJECTION, EMULSION INTRAVENOUS at 07:48

## 2020-06-15 RX ADMIN — PROPOFOL 150 MG: 10 INJECTION, EMULSION INTRAVENOUS at 07:44

## 2020-06-15 RX ADMIN — DEXAMETHASONE SODIUM PHOSPHATE 4 MG: 4 INJECTION, SOLUTION INTRA-ARTICULAR; INTRALESIONAL; INTRAMUSCULAR; INTRAVENOUS; SOFT TISSUE at 07:44

## 2020-06-15 RX ADMIN — FENTANYL CITRATE 100 MCG: 50 INJECTION, SOLUTION INTRAMUSCULAR; INTRAVENOUS at 07:44

## 2020-06-15 RX ADMIN — SODIUM CHLORIDE, POTASSIUM CHLORIDE, SODIUM LACTATE AND CALCIUM CHLORIDE: 600; 310; 30; 20 INJECTION, SOLUTION INTRAVENOUS at 07:40

## 2020-06-15 RX ADMIN — DEXAMETHASONE SODIUM PHOSPHATE 4 MG: 4 INJECTION, SOLUTION INTRA-ARTICULAR; INTRALESIONAL; INTRAMUSCULAR; INTRAVENOUS; SOFT TISSUE at 07:52

## 2020-06-15 RX ADMIN — PROPOFOL 20 MG: 10 INJECTION, EMULSION INTRAVENOUS at 08:03

## 2020-06-15 RX ADMIN — MIDAZOLAM 2 MG: 1 INJECTION INTRAMUSCULAR; INTRAVENOUS at 07:40

## 2020-06-15 RX ADMIN — ONDANSETRON HYDROCHLORIDE 4 MG: 2 INJECTION, SOLUTION INTRAVENOUS at 07:52

## 2020-06-15 RX ADMIN — DOXYCYCLINE 100 MG: 100 INJECTION, POWDER, LYOPHILIZED, FOR SOLUTION INTRAVENOUS at 07:51

## 2020-06-15 RX ADMIN — LIDOCAINE HYDROCHLORIDE 30 MG: 10 INJECTION, SOLUTION EPIDURAL; INFILTRATION; INTRACAUDAL; PERINEURAL at 07:44

## 2020-06-15 ASSESSMENT — MIFFLIN-ST. JEOR: SCORE: 1410.75

## 2020-06-15 NOTE — DISCHARGE INSTRUCTIONS
DR. CLARA ZEPEDA M.D.   CLINIC PHONE NUMBER:  [phone].  [facility]  Follow up in 1-2 weeks   Call [phone] for concerns    You may also directly page me by texting [phone] if concerns arise. Text me your name and number, and if I am available I will call back, if you do not hear from me in 15 minutes page the above number listed for answering service @ [phone], as I may be unavailable.      Call and ask to be seen or talk to a doctor for the following (You can go to the ob triage button on answering service line [phone]):        Increased bleeding, fever, general unwell feeling or increased pain.   Please call for any reason or concern!     Dr. Clara Zepeda, DO    OB/GYN   [facility]          You received Toradol, an IV form of ibuprofen (Motrin) at 07.  Do not take any ibuprofen products until 1 pm.    Maximum acetaminophen (Tylenol) dose from all sources should not exceed 4 grams (4000 mg) per day.  You received Tylenol 975 mg at 7 am today    GENERAL ANESTHESIA OR SEDATION ADULT DISCHARGE INSTRUCTIONS   SPECIAL PRECAUTIONS FOR 24 HOURS AFTER SURGERY    IT IS NOT UNUSUAL TO FEEL LIGHT-HEADED OR FAINT, UP TO 24 HOURS AFTER SURGERY OR WHILE TAKING PAIN MEDICATION.  IF YOU HAVE THESE SYMPTOMS; SIT FOR A FEW MINUTES BEFORE STANDING AND HAVE SOMEONE ASSIST YOU WHEN YOU GET UP TO WALK OR USE THE BATHROOM.    YOU SHOULD REST AND RELAX FOR THE NEXT 24 HOURS AND YOU MUST MAKE ARRANGEMENTS TO HAVE SOMEONE STAY WITH YOU FOR AT LEAST 24 HOURS AFTER YOUR DISCHARGE.  AVOID HAZARDOUS AND STRENUOUS ACTIVITIES.  DO NOT MAKE IMPORTANT DECISIONS FOR 24 HOURS.    DO NOT DRIVE ANY VEHICLE OR OPERATE MECHANICAL EQUIPMENT FOR 24 HOURS FOLLOWING THE END OF YOUR SURGERY.  EVEN THOUGH YOU MAY FEEL NORMAL, YOUR REACTIONS MAY BE AFFECTED BY THE MEDICATION YOU HAVE RECEIVED.    DO NOT DRINK ALCOHOLIC BEVERAGES FOR 24 HOURS FOLLOWING YOUR SURGERY.    DRINK CLEAR LIQUIDS  (APPLE JUICE, GINGER ALE, 7-UP, BROTH, ETC.).  PROGRESS TO YOUR REGULAR DIET AS YOU FEEL ABLE.    YOU MAY HAVE A DRY MOUTH, A SORE THROAT, MUSCLES ACHES OR TROUBLE SLEEPING.  THESE SHOULD GO AWAY AFTER 24 HOURS.    CALL YOUR DOCTOR FOR ANY OF THE FOLLOWING:  SIGNS OF INFECTION (FEVER, GROWING TENDERNESS AT THE SURGERY SITE, A LARGE AMOUNT OF DRAINAGE OR BLEEDING, SEVERE PAIN, FOUL-SMELLING DRAINAGE, REDNESS OR SWELLING.    IT HAS BEEN OVER 8 TO 10 HOURS SINCE SURGERY AND YOU ARE STILL NOT ABLE TO URINATE (PASS WATER).    DILATION AND CURETTAGE AND DILATION AND EVACUATION DISCHARGE INSTRUCTIONS    PLEASE RETURN TO THE CLINIC IN:  ____1 WEEK  ____2 WEEKS  ____4 WEEKS  ____6 WEEKS  MAKE THIS APPOINTMENT AFTER YOU GET HOME IF IT HAS NOT ALREADY BEEN SCHEDULED.    DO NOT DRIVE A CAR, DRINK ALCOHOL OR USE MACHINERY FOR THE NEXT 24 HOURS.  YOU SHOULD WAIT UNTIL YOU HAVE RECOVERED BEFORE MAKING ANY IMPORTANT DECISIONS.    PAIN AND DISCOMFORT  YOU MAY HAVE CRAMPS OR A LOW BACKACHE FOR 24 TO 48 HOURS.  TYLENOL (ACETAMINOPHEN) OR MOTRIN (IBUPROFEN) MAY HELP, OR YOUR DOCTOR MAY GIVE YOU PAIN MEDICINE.  CALL YOUR DOCTOR IF PAIN CANNOT BE CONTROLLED.  YOU MAY FEEL DROWSY AND WEAK FOR A DAY OR TWO.    VAGINAL DISCHARGE  YOU MAY HAVE SOME BLEEDING OR DISCHARGE FOR UP TO TWO WEEKS.  DO NOT DOUCHE, USE TAMPONS OR HAVE SEX (INTERCOURSE) IN THE FIRST WEEK.  CALL YOUR DOCTOR IF YOU SOAK MORE THAN ONE MAXI PAD (SANITARY NAPKIN) PER HOUR, OR IF YOU PASS LARGE BLOOD CLOTS.    OTHER SYMPTOMS  YOU MAY HAVE A LOW FEVER FOR THE FIRST TWO DAYS.  CALL YOUR DOCTOR IF YOUR FEVER GOES OVER 101 DEGREES FAHRENHEIT.    IF YOU HAVE NAUSEA (FEEL SICK TO YOUR STOMACH), STAY IN BED.  TRY DRINKING A SMALL AMOUNT 7-UP, TEA OR SOUP.    DIET AND ACTIVITY  EAT LIGHT MEALS AND DRINK PLENTY OF FLUIDS FOR THE FIRST 24 HOURS (OR LONGER, IF YOU HAVE NAUSEA).    YOU MAY BATHE, SHOWER AND CLIMB STAIRS.  MOST WOMEN CAN RETURN TO WORK AFTER 24 HOURS.  YOU MAY GO BACK  TO YOUR OTHER ACTIVITIES AFTER YOUR PAIN GOES AWAY.

## 2020-06-15 NOTE — ANESTHESIA POSTPROCEDURE EVALUATION
Patient: Susan Rubalcava    Procedure(s):  ULTRASOUND GUIDED SUCTION DILATION AND CURETTAGE, REPAIR OF CERVICAL LASCERATION    Diagnosis:Retained products of conception after delivery with complications [O73.1]  Diagnosis Additional Information: No value filed.    Anesthesia Type:  General    Note:  Anesthesia Post Evaluation    Patient location during evaluation: PACU  Patient participation: Able to fully participate in evaluation  Level of consciousness: awake  Pain management: adequate  Airway patency: patent  Cardiovascular status: acceptable  Respiratory status: acceptable  Hydration status: euvolemic  PONV: controlled     Anesthetic complications: None          Last vitals:  Vitals:    06/15/20 0900 06/15/20 0938 06/15/20 1019   BP: 114/71 109/67 103/60   Pulse: 64     Resp: 20 16 16   Temp:  97.7  F (36.5  C)    SpO2: 100% 100% 99%         Electronically Signed By: Ta aCstellanos MD  Toya 15, 2020  1:34 PM

## 2020-06-15 NOTE — ANESTHESIA CARE TRANSFER NOTE
Patient: Susan Rubalcava    Procedure(s):  ULTRASOUND GUIDED SUCTION DILATION AND CURETTAGE, REPAIR OF CERVICAL LASCERATION    Diagnosis: Retained products of conception after delivery with complications [O73.1]  Diagnosis Additional Information: No value filed.    Anesthesia Type:   General     Note:  Airway :Blow-by  Patient transferred to:PACU  Handoff Report: Identifed the Patient, Identified the Reponsible Provider, Reviewed the pertinent medical history, Discussed the surgical course, Reviewed Intra-OP anesthesia mangement and issues during anesthesia, Set expectations for post-procedure period and Allowed opportunity for questions and acknowledgement of understanding      Vitals: (Last set prior to Anesthesia Care Transfer)    CRNA VITALS  6/15/2020 0755 - 6/15/2020 0832      6/15/2020             NIBP:  116/82    Pulse:  110    NIBP Mean:  89    SpO2:  100 %    Resp Rate (observed):  10                Electronically Signed By: CARITO Gastelum CRNA  Toya 15, 2020  8:32 AM

## 2020-06-15 NOTE — OP NOTE
PREOPERATIVE DIAGNOSES: LYNDON is a very pleasant 34-year-old  s/p primary  section with delayed post partum hemorrhage with us findings suggesting retained products of conception.   POSTOPERATIVE DIAGNOSES:MF is a very pleasant 34-year-old  s/p primary  section with delayed post partum hemorrhage with us findings suggesting retained products of conception.   PROCEDURE:   1. Ultrasound guided suction dilatation and curettage.   2. Repair of unavoidable cervical laceration  SURGEON: Becca Zepeda DO.   COMPLICATIONS: None.   ESTIMATED BLOOD LOSS: 20 mL.   INDICATIONS: LYNDON is a very pleasant 34-year-old  s/p primary  section with delayed post partum hemorrhage with us findings suggesting retained products of conception.   She has been counseled regarding risks, benefits and alternatives of the procedure which included expectant management versus Cytotec treatment versus suction D&C.  Therefore, she presents today for definitive surgical treatment of retained products of conception with delayed post partum hemorrhage. Risks, benefits, alternatives of the procedure were discussed.   FINDINGS: A small 9 week size anteverted uterus. Tissue noted in the left fundus.  Tissue removed upon endometrial biopsy was suspected to have pustular features.   COMPLICATIONS: None.   DESCRIPTION OF PROCEDURE: After informed consent was obtained, the patient was taken to the operating room where she was placed in dorsal supine position, placed under general anesthesia.  The patient was then prepped and draped in normal sterile fashion. A timeout was performed.   Preoperative antibiotics were given, Doxycycline 100 mg IV at beginning of procedure.  Exam under anesthesia reveals the findings above. The bivalve speculum was placed in the patient's vaginal vault. the device was tested properly up to 60 mmHg.  Anterior lip of the cervix was grasped with a single-tooth tenaculum. The cervix was dilated  up to 8 mm and an endometrial biopsy was done to sound the uterus, which measured 9 cm, and using a # 7 curved curette, Suction D&C was performed under ultrasound guidance, the cervix was stenotic and the tenaculum came off, and was replaced.  Also with the original curved curette I could not maneuver lateral, to reach the left fundus where the tissue was located.  I then switched to a flexible curette and the tissue was removed easily, with the resulting endometrial stripe at < 2 mm.  The products of conception were sent to pathology.  After this, the single-tooth tenaculum was removed from the anterior lip of the cervix.  The cervical laceration noted was repaired with 0-vicryl in several figure of 8 sutures.  Hemostasis was assured.  The speculum was removed from the patient's vagina. The patient was then taken out of the dorsal lithotomy position, placed in dorsal supine position, awakened from anesthesia and taken to the recovery room in stable condition.     The patient will go home the following medications:   1. Doxycycline, vicodin.

## 2020-06-15 NOTE — BRIEF OP NOTE
[facility]    Brief Operative Note    Pre-operative diagnosis: Retained products of conception after delivery with complications [O73.1]  Post-operative diagnosis 35 y/o female with retained products of conception     Procedure: Procedure(s):  ULTRASOUND GUIDED SUCTION DILATION AND CURETTAGE, REPAIR OF CERVICAL LASCERATION  Surgeon: Surgeon(s) and Role:     * Becca Zepeda DO - Primary  Anesthesia: General   Estimated blood loss: 20 ml  Drains: None  Specimens:   ID Type Source Tests Collected by Time Destination   A : Products of Conception Products of Conception Placenta/ Fragments post Live Birth SURGICAL PATHOLOGY EXAM Becca Zepeda DO 6/15/2020  8:11 AM      Findings:   retained products of conception seen at left fundus.  Complications: None.  Implants: * No implants in log *

## 2020-06-15 NOTE — ANESTHESIA PREPROCEDURE EVALUATION
Anesthesia Pre-Procedure Evaluation    Patient: Susan Rubalcava   MRN: [id number] : 1985          Preoperative Diagnosis: Retained products of conception after delivery with complications [O73.1]    Procedure(s):  ULTRASOUND GUIDED SUCTION DILATION AND CURETTAGE    Past Medical History:   Diagnosis Date     ASCUS with positive high risk HPV     + HPV 16 colp - CANDIE I     Cervical high risk HPV (human papillomavirus) test positive , ,,,,-     Contact dermatitis and other eczema, due to unspecified cause      HELLP (hemolytic anemia/elev liver enzymes/low platelets in pregnancy) 2020    and chorio on placenta path - thick mec at C section     LSIL (low grade squamous intraepithelial lesion) on Pap smear , 10/13, ,     +high risk HPV, CANDIE 1 on pathology x 3     Mild dysplasia of cervix 2019    repeat pap with HPV in one year     Rheumatoid arthritis of multiple sites with negative rheumatoid factor (H) 2004    Senior year of high school and first year of college and no symptoms since then.     Past Surgical History:   Procedure Laterality Date      SECTION N/A 2020      Tom Molina MD;  thick mec and chorio on placental path      COLPOSCOPY  2019    mild dysplasia with L Mcmanus - repeat pap with HPV in one year     Anesthesia Evaluation     .             ROS/MED HX    ENT/Pulmonary:  - neg pulmonary ROS     Neurologic:  - neg neurologic ROS     Cardiovascular:     (+) hypertension----. : . . . :. .       METS/Exercise Tolerance:     Hematologic:         Musculoskeletal:   (+) arthritis,  -       GI/Hepatic:        (-) GERD   Renal/Genitourinary:  - ROS Renal section negative       Endo:  - neg endo ROS       Psychiatric:  - neg psychiatric ROS       Infectious Disease:  - neg infectious disease ROS       Malignancy:      - no malignancy   Other:    - neg other ROS                      Physical Exam      Airway   Mallampati:  II  TM distance: >3 FB  Neck ROM: full    Dental     Cardiovascular   Rhythm and rate: regular and normal  (-) no murmur    Pulmonary    breath sounds clear to auscultation    Other findings: Lab Test        06/08/20 05/07/20 05/06/20                       1541          0707          0619          WBC          7.9          17.6*        23.5*         HGB          10.7*        10.2*        11.6*         MCV          95           95           94            PLT          371          177          130*           Lab Test        05/07/20 05/06/20 05/05/20                       0707          0619          1900          NA           138          135          135           POTASSIUM    4.6          4.6          4.9           CHLORIDE     108          106          106           CO2          26           25           25            BUN          11           14           12            CR           0.67         0.78         0.78          ANIONGAP     4            4            4             AGNIESZKA          7.9*         7.9*         7.6*          GLC          95           101*         112*                Lab Results   Component Value Date    WBC 7.9 06/08/2020    HGB 10.7 (L) 06/08/2020    HCT 34.4 (L) 06/08/2020     06/08/2020    CRP 1.5 06/22/2007     05/07/2020    POTASSIUM 4.6 05/07/2020    CHLORIDE 108 05/07/2020    CO2 26 05/07/2020    BUN 11 05/07/2020    CR 0.67 05/07/2020    GLC 95 05/07/2020    AGNIESZKA 7.9 (L) 05/07/2020    MAG 5.1 (H) 05/05/2020    ALBUMIN 2.2 (L) 05/06/2020    PROTTOTAL 6.1 (L) 05/06/2020    ALT 35 05/18/2020    AST 20 05/18/2020    ALKPHOS 220 (H) 05/06/2020    BILITOTAL 0.5 05/06/2020    FIBR 410 05/04/2020    TSH 1.54 09/23/2005    HCG Negative 06/04/2019    HCGS Positive (A) 08/17/2019       Preop Vitals  BP Readings from Last 3 Encounters:   06/15/20 117/85   06/09/20 104/62   05/18/20 110/78    Pulse Readings from Last 3 Encounters:   05/07/20 104   04/27/20 85   04/20/20 72  "     Resp Readings from Last 3 Encounters:   06/15/20 20   05/07/20 16   04/27/20 16    SpO2 Readings from Last 3 Encounters:   06/15/20 97%   05/07/20 100%   04/27/20 98%      Temp Readings from Last 1 Encounters:   06/15/20 97  F (36.1  C) (Temporal)    Ht Readings from Last 1 Encounters:   06/15/20 1.676 m (5' 6\")      Wt Readings from Last 1 Encounters:   06/15/20 69.4 kg (153 lb)    Estimated body mass index is 24.69 kg/m  as calculated from the following:    Height as of this encounter: 1.676 m (5' 6\").    Weight as of this encounter: 69.4 kg (153 lb).       Anesthesia Plan      History & Physical Review  History and physical reviewed and following examination; no interval change.    ASA Status:  2 .    NPO Status:  > 8 hours    Plan for General with Propofol induction.   PONV prophylaxis:  Ondansetron (or other 5HT-3)         Postoperative Care  Postoperative pain management:  IV analgesics and Oral pain medications.      Consents  Anesthetic plan, risks, benefits and alternatives discussed with:  Patient..                 Ta Castellanos MD                    .  "

## 2020-06-16 LAB — COPATH REPORT: NORMAL

## 2020-06-25 ENCOUNTER — PRENATAL OFFICE VISIT (OUTPATIENT)
Dept: OBGYN | Facility: CLINIC | Age: 35
End: 2020-06-25
Payer: COMMERCIAL

## 2020-06-25 VITALS
HEIGHT: 66 IN | SYSTOLIC BLOOD PRESSURE: 108 MMHG | DIASTOLIC BLOOD PRESSURE: 70 MMHG | WEIGHT: 153 LBS | BODY MASS INDEX: 24.59 KG/M2

## 2020-06-25 DIAGNOSIS — Z30.41 ENCOUNTER FOR SURVEILLANCE OF CONTRACEPTIVE PILLS: ICD-10-CM

## 2020-06-25 DIAGNOSIS — Z12.4 SCREENING FOR CERVICAL CANCER: Primary | ICD-10-CM

## 2020-06-25 PROCEDURE — G0145 SCR C/V CYTO,THINLAYER,RESCR: HCPCS | Performed by: FAMILY MEDICINE

## 2020-06-25 PROCEDURE — 87624 HPV HI-RISK TYP POOLED RSLT: CPT | Performed by: FAMILY MEDICINE

## 2020-06-25 PROCEDURE — 99207 ZZC POST PARTUM EXAM: CPT | Performed by: FAMILY MEDICINE

## 2020-06-25 ASSESSMENT — MIFFLIN-ST. JEOR: SCORE: 1410.75

## 2020-06-25 ASSESSMENT — PATIENT HEALTH QUESTIONNAIRE - PHQ9: SUM OF ALL RESPONSES TO PHQ QUESTIONS 1-9: 2

## 2020-06-25 NOTE — TELEPHONE ENCOUNTER
Pt calling:  States pharmacy would not fill OCP. (too soon to fill)  Was taking 2 pills BID, then 1 pill BID, then 1 pill daily x 2 to stop bleeding.  Will need pharmacy overide.  rx faxed with previous sig as noted in chart for 1 month.  (pt will only take daily, then can get 3 month supply )  Patient notified of instructions.  To call back if encounters difficulty filling RX in the future.  Bushra Quiles RN

## 2020-06-25 NOTE — NURSING NOTE
"Chief Complaint   Patient presents with     Postpartum Care     D&C 6/15, C/S on 20, Billy       Initial /70 (BP Location: Left arm, Patient Position: Chair, Cuff Size: Adult Large)   Ht 1.676 m (5' 6\")   Wt 69.4 kg (153 lb)   LMP 2019 (Exact Date)   Breastfeeding No   BMI 24.69 kg/m   Estimated body mass index is 24.69 kg/m  as calculated from the following:    Height as of this encounter: 1.676 m (5' 6\").    Weight as of this encounter: 69.4 kg (153 lb).  BP completed using cuff size: large    Questioned patient about current smoking habits.  Pt. has never smoked.          The following HM Due: pap due  Whit Harper, PAO         "

## 2020-06-25 NOTE — PATIENT INSTRUCTIONS
Return yearly   Dr. Becca Zepeda, DO    Obstetrics and Gynecology  [facility] [place] [facility] [place]

## 2020-06-29 LAB
COPATH REPORT: NORMAL
PAP: NORMAL

## 2020-06-30 LAB
FINAL DIAGNOSIS: NORMAL
HPV HR 12 DNA CVX QL NAA+PROBE: NEGATIVE
HPV16 DNA SPEC QL NAA+PROBE: NEGATIVE
HPV18 DNA SPEC QL NAA+PROBE: NEGATIVE
SPECIMEN DESCRIPTION: NORMAL
SPECIMEN SOURCE CVX/VAG CYTO: NORMAL

## 2020-09-27 ENCOUNTER — MYC MEDICAL ADVICE (OUTPATIENT)
Dept: OBGYN | Facility: CLINIC | Age: 35
End: 2020-09-27

## 2020-09-27 DIAGNOSIS — Z30.011 ENCOUNTER FOR INITIAL PRESCRIPTION OF CONTRACEPTIVE PILLS: Primary | ICD-10-CM

## 2020-09-28 RX ORDER — NORGESTIMATE AND ETHINYL ESTRADIOL 7DAYSX3 28
1 KIT ORAL DAILY
Qty: 84 TABLET | Refills: 0 | Status: SHIPPED | OUTPATIENT
Start: 2020-09-28 | End: 2020-11-05

## 2020-10-13 ENCOUNTER — ALLIED HEALTH/NURSE VISIT (OUTPATIENT)
Dept: FAMILY MEDICINE | Facility: CLINIC | Age: 35
End: 2020-10-13
Payer: COMMERCIAL

## 2020-10-13 DIAGNOSIS — Z23 NEED FOR PROPHYLACTIC VACCINATION AND INOCULATION AGAINST INFLUENZA: Primary | ICD-10-CM

## 2020-10-13 PROCEDURE — 90471 IMMUNIZATION ADMIN: CPT

## 2020-10-13 PROCEDURE — 90686 IIV4 VACC NO PRSV 0.5 ML IM: CPT

## 2020-10-13 PROCEDURE — 99207 PR NO CHARGE NURSE ONLY: CPT

## 2020-11-05 ENCOUNTER — MYC REFILL (OUTPATIENT)
Dept: OBGYN | Facility: CLINIC | Age: 35
End: 2020-11-05

## 2020-11-05 DIAGNOSIS — Z30.011 ENCOUNTER FOR INITIAL PRESCRIPTION OF CONTRACEPTIVE PILLS: ICD-10-CM

## 2020-11-06 RX ORDER — NORGESTIMATE AND ETHINYL ESTRADIOL 7DAYSX3 28
1 KIT ORAL DAILY
Qty: 84 TABLET | Refills: 2 | Status: SHIPPED | OUTPATIENT
Start: 2020-11-06 | End: 2021-09-07

## 2021-09-01 ENCOUNTER — VIRTUAL VISIT (OUTPATIENT)
Dept: FAMILY MEDICINE | Facility: CLINIC | Age: 36
End: 2021-09-01
Payer: COMMERCIAL

## 2021-09-01 DIAGNOSIS — M79.644 PAIN OF FINGER OF RIGHT HAND: ICD-10-CM

## 2021-09-01 DIAGNOSIS — L30.1 DYSHIDROTIC ECZEMA: Primary | ICD-10-CM

## 2021-09-01 PROCEDURE — 99213 OFFICE O/P EST LOW 20 MIN: CPT | Mod: GT | Performed by: PHYSICIAN ASSISTANT

## 2021-09-01 RX ORDER — TRIAMCINOLONE ACETONIDE 1 MG/G
CREAM TOPICAL 2 TIMES DAILY
Qty: 80 G | Refills: 0 | Status: SHIPPED | OUTPATIENT
Start: 2021-09-01 | End: 2021-10-11

## 2021-09-01 NOTE — PROGRESS NOTES
Susan is a 35 year old who is being evaluated via a billable video visit.      How would you like to obtain your AVS? MyChart  If the video visit is dropped, the invitation should be resent by: Text to cell phone: see demographics  Will anyone else be joining your video visit? No    Video Start Time: 12:55    Assessment & Plan     Dyshidrotic eczema  Present on history. Exam limited due virtual nature. Improving per patient however will give steroid to aid in further management and in case returns. Continue hydration. Follow up prn if worsening.   - triamcinolone (KENALOG) 0.1 % external cream; Apply topically 2 times daily  -Medication use and side effects discussed with the patient. Patient is in complete understanding and agreement with plan.       Pain of finger of right hand  Ongoing. I do not believe this is related to rash. Reported history of what sounds as inflammatory arthritis per patient history. Has not had symptoms since teenage years. Recommending otc nsaids and monitoring. If failure to improve in 2 weeks or worsening, would recommend following up with rheumatology.        }    Return in about 6 months (around 3/1/2022) for Physical Exam, with pcp.    Matt Stewart PA-C  [facility]   Susan is a 35 year old who presents for the following health issues    History of Present Illness       She eats 2-3 servings of fruits and vegetables daily.She consumes 2 sweetened beverage(s) daily.She exercises with enough effort to increase her heart rate 10 to 19 minutes per day.  She exercises with enough effort to increase her heart rate 3 or less days per week.   She is taking medications regularly.       Rash  Onset/Duration: 1 week  Description  Location: right hand  Character: flakey, red  Itching: moderate  Intensity:  moderate  Progression of Symptoms:  improving  Accompanying signs and symptoms:   Fever: no  Body aches or joint pain: YES- right ring and  middle finger-improving. States had similar symptoms at teenager and diagnosed with arthritis. Treated with prednisone and methotrexate and symptoms resolved and has not needed any treatment or follow up in 17 years.   Sore throat symptoms: no  Recent cold symptoms: no  History:           Previous episodes of similar rash: None  New exposures:  None  Recent travel: no  Exposure to similar rash: no  Precipitating or alleviating factors: none   Therapies tried and outcome: Aquaphor. Improvement slightly.       Review of Systems   Constitutional, HEENT, cardiovascular, pulmonary, GI, , musculoskeletal, neuro, skin, endocrine and psych systems are negative, except as otherwise noted.      Objective           Vitals:  No vitals were obtained today due to virtual visit.    Physical Exam   GENERAL: Healthy, alert and no distress  EYES: Eyes grossly normal to inspection.  No discharge or erythema, or obvious scleral/conjunctival abnormalities.  RESP: No audible wheeze, cough, or visible cyanosis.  No visible retractions or increased work of breathing.    SKIN: Visible skin clear. No significant rash, abnormal pigmentation or lesions.  NEURO: Cranial nerves grossly intact.  Mentation and speech appropriate for age.  PSYCH: Mentation appears normal, affect normal/bright, judgement and insight intact, normal speech and appearance well-groomed.          Video-Visit Details    Type of service:  Video Visit    Video End Time:1:06    Originating Location (pt. Location): Other school in [facility]    Distant Location (provider location):  [facility]     Platform used for Video Visit: [facility]

## 2021-09-05 ENCOUNTER — HEALTH MAINTENANCE LETTER (OUTPATIENT)
Age: 36
End: 2021-09-05

## 2021-09-06 DIAGNOSIS — Z30.011 ENCOUNTER FOR INITIAL PRESCRIPTION OF CONTRACEPTIVE PILLS: ICD-10-CM

## 2021-09-07 RX ORDER — NORGESTIMATE AND ETHINYL ESTRADIOL 7DAYSX3 28
KIT ORAL
Qty: 84 TABLET | Refills: 0 | Status: SHIPPED | OUTPATIENT
Start: 2021-09-07 | End: 2021-11-29

## 2021-09-07 NOTE — TELEPHONE ENCOUNTER
Medication is being filled for 1 time refill only due to:  Patient needs to be seen because it has been more than one year since last visit.     Sumi ROSAS RN

## 2021-09-24 ENCOUNTER — MYC MEDICAL ADVICE (OUTPATIENT)
Dept: FAMILY MEDICINE | Facility: CLINIC | Age: 36
End: 2021-09-24

## 2021-09-24 DIAGNOSIS — L30.1 DYSHIDROTIC ECZEMA: Primary | ICD-10-CM

## 2021-09-29 NOTE — TELEPHONE ENCOUNTER
Pt viewed [facility] Mychart message but has not sent response, will monitor and proceed accordingly  Selena Lazo RN, BSN  Message handled by CLINIC NURSE.

## 2021-10-11 RX ORDER — TRIAMCINOLONE ACETONIDE 5 MG/G
CREAM TOPICAL 2 TIMES DAILY
Qty: 80 G | Refills: 0 | Status: SHIPPED | OUTPATIENT
Start: 2021-10-11 | End: 2021-12-13

## 2021-10-12 ENCOUNTER — MYC MEDICAL ADVICE (OUTPATIENT)
Dept: FAMILY MEDICINE | Facility: CLINIC | Age: 36
End: 2021-10-12

## 2021-10-15 NOTE — TELEPHONE ENCOUNTER
Summary:    Patient is due/failing the following:   Flu vaccine    Reviewed:  [] CARE EVERYWHERE  [] LAST OV NOTE INCLUDING ENDO  [] FYI TAB  [] MYCHART ACTIVE?  [] LAST PANEL ENCOUNTER  [] FUTURE APPTS  [] IMMUNIZATIONS          Action needed:   Patient needs nurse only appointment.    Type of outreach:    Sent [id number] message.                                                                               Eryn Freitas/PAO  [place]

## 2021-10-24 ENCOUNTER — E-VISIT (OUTPATIENT)
Dept: FAMILY MEDICINE | Facility: CLINIC | Age: 36
End: 2021-10-24
Payer: COMMERCIAL

## 2021-10-24 DIAGNOSIS — N39.0 ACUTE UTI (URINARY TRACT INFECTION): Primary | ICD-10-CM

## 2021-10-24 PROCEDURE — 99421 OL DIG E/M SVC 5-10 MIN: CPT | Performed by: PHYSICIAN ASSISTANT

## 2021-10-24 RX ORDER — NITROFURANTOIN 25; 75 MG/1; MG/1
100 CAPSULE ORAL 2 TIMES DAILY
Qty: 10 CAPSULE | Refills: 0 | Status: SHIPPED | OUTPATIENT
Start: 2021-10-24 | End: 2021-10-29

## 2021-10-24 NOTE — PATIENT INSTRUCTIONS
Dear Susan Rubalcava    After reviewing your responses, I've been able to diagnose you with a urinary tract infection, which is a common infection of the bladder with bacteria.  This is not a sexually transmitted infection, though urinating immediately after intercourse can help prevent infections.  Drinking lots of fluids is also helpful to clear your current infection and prevent the next one.      I have sent a prescription for antibiotics to your pharmacy to treat this infection.    It is important that you take all of your prescribed medication even if your symptoms are improving after a few doses.  Taking all of your medicine helps prevent the symptoms from returning.     If your symptoms worsen, you develop pain in your back or stomach, develop fevers, or are not improving in 5 days, please contact your primary care provider for an appointment or visit any of our convenient Walk-in or Urgent Care Centers to be seen, which can be found on our website here.    Thanks again for choosing us as your health care partner,    Eri Landers PA-C

## 2021-12-09 SDOH — ECONOMIC STABILITY: INCOME INSECURITY: HOW HARD IS IT FOR YOU TO PAY FOR THE VERY BASICS LIKE FOOD, HOUSING, MEDICAL CARE, AND HEATING?: NOT HARD AT ALL

## 2021-12-09 SDOH — ECONOMIC STABILITY: TRANSPORTATION INSECURITY
IN THE PAST 12 MONTHS, HAS LACK OF TRANSPORTATION KEPT YOU FROM MEETINGS, WORK, OR FROM GETTING THINGS NEEDED FOR DAILY LIVING?: NO

## 2021-12-09 SDOH — ECONOMIC STABILITY: FOOD INSECURITY: WITHIN THE PAST 12 MONTHS, THE FOOD YOU BOUGHT JUST DIDN'T LAST AND YOU DIDN'T HAVE MONEY TO GET MORE.: NEVER TRUE

## 2021-12-09 SDOH — ECONOMIC STABILITY: TRANSPORTATION INSECURITY
IN THE PAST 12 MONTHS, HAS THE LACK OF TRANSPORTATION KEPT YOU FROM MEDICAL APPOINTMENTS OR FROM GETTING MEDICATIONS?: NO

## 2021-12-09 SDOH — ECONOMIC STABILITY: FOOD INSECURITY: WITHIN THE PAST 12 MONTHS, YOU WORRIED THAT YOUR FOOD WOULD RUN OUT BEFORE YOU GOT MONEY TO BUY MORE.: NEVER TRUE

## 2021-12-09 SDOH — HEALTH STABILITY: PHYSICAL HEALTH: ON AVERAGE, HOW MANY MINUTES DO YOU ENGAGE IN EXERCISE AT THIS LEVEL?: 10 MIN

## 2021-12-09 SDOH — ECONOMIC STABILITY: INCOME INSECURITY: IN THE LAST 12 MONTHS, WAS THERE A TIME WHEN YOU WERE NOT ABLE TO PAY THE MORTGAGE OR RENT ON TIME?: NO

## 2021-12-09 SDOH — HEALTH STABILITY: PHYSICAL HEALTH: ON AVERAGE, HOW MANY DAYS PER WEEK DO YOU ENGAGE IN MODERATE TO STRENUOUS EXERCISE (LIKE A BRISK WALK)?: 1 DAY

## 2021-12-09 ASSESSMENT — ENCOUNTER SYMPTOMS
HEARTBURN: 0
SHORTNESS OF BREATH: 0
HEMATOCHEZIA: 0
BREAST MASS: 0
PALPITATIONS: 0
HEMATURIA: 0
CONSTIPATION: 0
SORE THROAT: 0
MYALGIAS: 0
NERVOUS/ANXIOUS: 0
PARESTHESIAS: 0
DIARRHEA: 0
HEADACHES: 0
ARTHRALGIAS: 0
COUGH: 0
ABDOMINAL PAIN: 0
DYSURIA: 0
JOINT SWELLING: 0
DIZZINESS: 0
WEAKNESS: 0
FREQUENCY: 0
CHILLS: 0
FEVER: 0
EYE PAIN: 0
NAUSEA: 0

## 2021-12-09 ASSESSMENT — SOCIAL DETERMINANTS OF HEALTH (SDOH)
DO YOU BELONG TO ANY CLUBS OR ORGANIZATIONS SUCH AS CHURCH GROUPS UNIONS, FRATERNAL OR ATHLETIC GROUPS, OR SCHOOL GROUPS?: NO
IN A TYPICAL WEEK, HOW MANY TIMES DO YOU TALK ON THE PHONE WITH FAMILY, FRIENDS, OR NEIGHBORS?: MORE THAN THREE TIMES A WEEK
HOW OFTEN DO YOU ATTEND CHURCH OR RELIGIOUS SERVICES?: NEVER
HOW OFTEN DO YOU GET TOGETHER WITH FRIENDS OR RELATIVES?: ONCE A WEEK

## 2021-12-09 ASSESSMENT — LIFESTYLE VARIABLES
HOW MANY STANDARD DRINKS CONTAINING ALCOHOL DO YOU HAVE ON A TYPICAL DAY: 1 OR 2
HOW OFTEN DO YOU HAVE A DRINK CONTAINING ALCOHOL: 2-4 TIMES A MONTH
HOW OFTEN DO YOU HAVE SIX OR MORE DRINKS ON ONE OCCASION: NEVER

## 2021-12-13 ENCOUNTER — OFFICE VISIT (OUTPATIENT)
Dept: FAMILY MEDICINE | Facility: CLINIC | Age: 36
End: 2021-12-13
Payer: COMMERCIAL

## 2021-12-13 VITALS
HEART RATE: 76 BPM | WEIGHT: 154 LBS | SYSTOLIC BLOOD PRESSURE: 132 MMHG | DIASTOLIC BLOOD PRESSURE: 86 MMHG | BODY MASS INDEX: 24.17 KG/M2 | HEIGHT: 67 IN | TEMPERATURE: 97.7 F | OXYGEN SATURATION: 98 %

## 2021-12-13 DIAGNOSIS — Z11.59 NEED FOR HEPATITIS C SCREENING TEST: ICD-10-CM

## 2021-12-13 DIAGNOSIS — L30.1 DYSHIDROTIC ECZEMA: ICD-10-CM

## 2021-12-13 DIAGNOSIS — Z00.00 ROUTINE GENERAL MEDICAL EXAMINATION AT A HEALTH CARE FACILITY: ICD-10-CM

## 2021-12-13 DIAGNOSIS — Z30.011 ENCOUNTER FOR INITIAL PRESCRIPTION OF CONTRACEPTIVE PILLS: ICD-10-CM

## 2021-12-13 LAB
ERYTHROCYTE [DISTWIDTH] IN BLOOD BY AUTOMATED COUNT: 12.8 % (ref 10–15)
HCT VFR BLD AUTO: 41.7 % (ref 35–47)
HGB BLD-MCNC: 13.2 G/DL (ref 11.7–15.7)
MCH RBC QN AUTO: 28.4 PG (ref 26.5–33)
MCHC RBC AUTO-ENTMCNC: 31.7 G/DL (ref 31.5–36.5)
MCV RBC AUTO: 90 FL (ref 78–100)
PLATELET # BLD AUTO: 398 10E3/UL (ref 150–450)
RBC # BLD AUTO: 4.64 10E6/UL (ref 3.8–5.2)
WBC # BLD AUTO: 7.8 10E3/UL (ref 4–11)

## 2021-12-13 PROCEDURE — 80048 BASIC METABOLIC PNL TOTAL CA: CPT | Performed by: FAMILY MEDICINE

## 2021-12-13 PROCEDURE — 86803 HEPATITIS C AB TEST: CPT | Performed by: FAMILY MEDICINE

## 2021-12-13 PROCEDURE — 82306 VITAMIN D 25 HYDROXY: CPT | Performed by: FAMILY MEDICINE

## 2021-12-13 PROCEDURE — 36415 COLL VENOUS BLD VENIPUNCTURE: CPT | Performed by: FAMILY MEDICINE

## 2021-12-13 PROCEDURE — 80061 LIPID PANEL: CPT | Performed by: FAMILY MEDICINE

## 2021-12-13 PROCEDURE — 99395 PREV VISIT EST AGE 18-39: CPT | Performed by: FAMILY MEDICINE

## 2021-12-13 PROCEDURE — 85027 COMPLETE CBC AUTOMATED: CPT | Performed by: FAMILY MEDICINE

## 2021-12-13 RX ORDER — NORGESTIMATE AND ETHINYL ESTRADIOL 7DAYSX3 28
1 KIT ORAL DAILY
Qty: 84 TABLET | Refills: 4 | Status: SHIPPED | OUTPATIENT
Start: 2021-12-13 | End: 2023-01-27

## 2021-12-13 RX ORDER — TRIAMCINOLONE ACETONIDE 5 MG/G
CREAM TOPICAL 2 TIMES DAILY
Qty: 80 G | Refills: 3 | Status: SHIPPED | OUTPATIENT
Start: 2021-12-13

## 2021-12-13 ASSESSMENT — ENCOUNTER SYMPTOMS
PARESTHESIAS: 0
CONSTIPATION: 0
HEARTBURN: 0
CHILLS: 0
SHORTNESS OF BREATH: 0
JOINT SWELLING: 0
NERVOUS/ANXIOUS: 0
DYSURIA: 0
COUGH: 0
NAUSEA: 0
BREAST MASS: 0
DIZZINESS: 0
DIARRHEA: 0
ARTHRALGIAS: 0
ABDOMINAL PAIN: 0
HEMATOCHEZIA: 0
PALPITATIONS: 0
HEADACHES: 0
EYE PAIN: 0
HEMATURIA: 0
WEAKNESS: 0
SORE THROAT: 0
FEVER: 0
FREQUENCY: 0
MYALGIAS: 0

## 2021-12-13 ASSESSMENT — MIFFLIN-ST. JEOR: SCORE: 1413.23

## 2021-12-13 NOTE — PROGRESS NOTES
SUBJECTIVE:   CC: Susan Rubalcava is an 36 year old woman who presents for preventive health visit.       Patient has been advised of split billing requirements and indicates understanding: Yes  Healthy Habits:     Getting at least 3 servings of Calcium per day:  NO    Bi-annual eye exam:  Yes    Dental care twice a year:  Yes    Sleep apnea or symptoms of sleep apnea:  None    Diet:  Regular (no restrictions)    Frequency of exercise:  None    Taking medications regularly:  Yes    Medication side effects:  Not applicable    PHQ-2 Total Score: 0    Additional concerns today:  No      Past Medical History:   Diagnosis Date     ASCUS with positive high risk HPV     + HPV 16 colp - CANDIE I     Cervical high risk HPV (human papillomavirus) test positive , ,,,,-     Contact dermatitis and other eczema, due to unspecified cause      HELLP (hemolytic anemia/elev liver enzymes/low platelets in pregnancy) 2020    and chorio on placenta path - thick mec at C section     LSIL (low grade squamous intraepithelial lesion) on Pap smear , 10/13, ,     +high risk HPV, CANDIE 1 on pathology x 3     Mild dysplasia of cervix 2019    repeat pap with HPV in one year     Rheumatoid arthritis of multiple sites with negative rheumatoid factor (H) 2004    Senior year of high school and first year of college and no symptoms since then.       Past Surgical History:   Procedure Laterality Date      SECTION N/A 2020      Tom Molina MD;  thick mec and chorio on placental path      COLPOSCOPY  2019    mild dysplasia with L Mcmanus - repeat pap with HPV in one year     DILATION AND CURETTAGE SUCTION WITH ULTRASOUND GUIDANCE N/A 6/15/2020    Procedure: ULTRASOUND GUIDED SUCTION DILATION AND CURETTAGE, REPAIR OF CERVICAL LASCERATION;  Surgeon: Becca Zepeda DO;  Location:  OR       MEDICATIONS:  Current Outpatient Medications   Medication      norgestim-eth estrad triphasic (TRI-SPRINTEC) 0.18/0.215/0.25 MG-35 MCG tablet     triamcinolone (ARISTOCORT HP) 0.5 % external cream     No current facility-administered medications for this visit.       SOCIAL HISTORY:  Social History     Tobacco Use     Smoking status: Never Smoker     Smokeless tobacco: Never Used   Substance Use Topics     Alcohol use: Yes     Comment: 2 drinks a month       Family History   Problem Relation Age of Onset     Thyroid Disease Mother      Prostate Cancer Father      Prostate Problems Father 61        Prostate Cancer     Breast Cancer Maternal Grandmother 80     Diabetes Maternal Grandfather         in70's     Heart Disease Paternal Grandmother      Rheumatoid Arthritis Paternal Grandmother      Cerebrovascular Disease Paternal Grandfather      Alzheimer Disease Paternal Grandfather      Mental Illness Cousin      C.A.D. No family hx of      Cancer - colorectal No family hx of          Today's PHQ-2 Score:   PHQ-2 ( 1999 Pfizer) 12/9/2021   Q1: Little interest or pleasure in doing things 0   Q2: Feeling down, depressed or hopeless 0   PHQ-2 Score 0   PHQ-2 Total Score (12-17 Years)- Positive if 3 or more points; Administer PHQ-A if positive -   Q1: Little interest or pleasure in doing things Not at all   Q2: Feeling down, depressed or hopeless Not at all   PHQ-2 Score 0       Abuse: Current or Past (Physical, Sexual or Emotional) - No  Do you feel safe in your environment? Yes    Have you ever done Advance Care Planning? (For example, a Health Directive, POLST, or a discussion with a medical provider or your loved ones about your wishes): No, advance care planning information given to patient to review.  Patient declined advance care planning discussion at this time.    Social History     Tobacco Use     Smoking status: Never Smoker     Smokeless tobacco: Never Used   Substance Use Topics     Alcohol use: Yes     Comment: 2 drinks a month     If you drink alcohol do you typically  have >3 drinks per day or >7 drinks per week? No    Alcohol Use 12/9/2021   Prescreen: >3 drinks/day or >7 drinks/week? No   Prescreen: >3 drinks/day or >7 drinks/week? -       Reviewed orders with patient.  Reviewed health maintenance and updated orders accordingly - Yes  Labs reviewed in EPIC    Breast Cancer Screening:    FHS-7:   Breast CA Risk Assessment (FHS-7) 12/9/2021   Did any of your first-degree relatives have breast or ovarian cancer? No   Did any of your relatives have bilateral breast cancer? No   Did any man in your family have breast cancer? No   Did any woman in your family have breast and ovarian cancer? Yes   Did any woman in your family have breast cancer before age 50 y? No   Do you have 2 or more relatives with breast and/or ovarian cancer? No   Do you have 2 or more relatives with breast and/or bowel cancer? No       Patient under 40 years of age: Routine Mammogram Screening not recommended.   Pertinent mammograms are reviewed under the imaging tab.    History of abnormal Pap smear: NO - age 30-65 PAP every 5 years with negative HPV co-testing recommended  PAP / HPV Latest Ref Rng & Units 6/25/2020 5/17/2019 5/21/2018   PAP (Historical) - NIL NIL NIL   HPV16 NEG:Negative Negative Negative Negative   HPV18 NEG:Negative Negative Negative Negative   HRHPV NEG:Negative Negative Positive(A) Positive(A)     Reviewed and updated as needed this visit by clinical staff  Tobacco  Allergies       Soc Hx       Reviewed and updated as needed this visit by Provider               Past Medical History:   Diagnosis Date     ASCUS with positive high risk HPV 9/06    + HPV 16 colp - CANDIE I     Cervical high risk HPV (human papillomavirus) test positive 2006, '08,'13,'14,'16,'17-'19     Contact dermatitis and other eczema, due to unspecified cause      HELLP (hemolytic anemia/elev liver enzymes/low platelets in pregnancy) 05/04/2020    and chorio on placenta path - thick mec at C section     LSIL (low grade  squamous intraepithelial lesion) on Pap smear , 10/13, ,     +high risk HPV, CANDIE 1 on pathology x 3     Mild dysplasia of cervix 2019    repeat pap with HPV in one year     Rheumatoid arthritis of multiple sites with negative rheumatoid factor (H) 2004    Senior year of high school and first year of college and no symptoms since then.       Past Surgical History:   Procedure Laterality Date      SECTION N/A 2020      Tom Molina MD;  thick mec and chorio on placental path      COLPOSCOPY  2019    mild dysplasia with L Mcmanus - repeat pap with HPV in one year     DILATION AND CURETTAGE SUCTION WITH ULTRASOUND GUIDANCE N/A 6/15/2020    Procedure: ULTRASOUND GUIDED SUCTION DILATION AND CURETTAGE, REPAIR OF CERVICAL LASCERATION;  Surgeon: Becca Zepeda DO;  Location:  OR       MEDICATIONS:  Current Outpatient Medications   Medication     norgestim-eth estrad triphasic (TRI-SPRINTEC) 0.18/0.215/0.25 MG-35 MCG tablet     triamcinolone (ARISTOCORT HP) 0.5 % external cream     No current facility-administered medications for this visit.       SOCIAL HISTORY:  Social History     Tobacco Use     Smoking status: Never Smoker     Smokeless tobacco: Never Used   Substance Use Topics     Alcohol use: Yes     Comment: 2 drinks a month       Family History   Problem Relation Age of Onset     Thyroid Disease Mother      Prostate Cancer Father      Prostate Problems Father 61        Prostate Cancer     Breast Cancer Maternal Grandmother 80     Diabetes Maternal Grandfather         in70's     Heart Disease Paternal Grandmother      Rheumatoid Arthritis Paternal Grandmother      Cerebrovascular Disease Paternal Grandfather      Alzheimer Disease Paternal Grandfather      Mental Illness Cousin      C.A.D. No family hx of      Cancer - colorectal No family hx of          Review of Systems   Constitutional: Negative for chills and fever.   HENT: Negative for congestion, ear  "pain, hearing loss and sore throat.    Eyes: Negative for pain and visual disturbance.   Respiratory: Negative for cough and shortness of breath.    Cardiovascular: Negative for chest pain, palpitations and peripheral edema.   Gastrointestinal: Negative for abdominal pain, constipation, diarrhea, heartburn, hematochezia and nausea.   Breasts:  Negative for tenderness, breast mass and discharge.   Genitourinary: Negative for dysuria, frequency, genital sores, hematuria, pelvic pain, urgency, vaginal bleeding and vaginal discharge.   Musculoskeletal: Negative for arthralgias, joint swelling and myalgias.   Skin: Negative for rash.   Neurological: Negative for dizziness, weakness, headaches and paresthesias.   Psychiatric/Behavioral: Negative for mood changes. The patient is not nervous/anxious.           OBJECTIVE:   /86 (BP Location: Right arm, Patient Position: Chair, Cuff Size: Adult Regular)   Pulse 76   Temp 97.7  F (36.5  C) (Oral)   Ht 1.689 m (5' 6.5\")   Wt 69.9 kg (154 lb)   SpO2 98%   BMI 24.48 kg/m    Physical Exam  GENERAL: healthy, alert and no distress  EYES: Eyes grossly normal to inspection, PERRL and conjunctivae and sclerae normal  HENT: ear canals and TM's normal, nose and mouth without ulcers or lesions  NECK: no adenopathy, no asymmetry, masses, or scars and thyroid normal to palpation  RESP: lungs clear to auscultation - no rales, rhonchi or wheezes  BREAST: normal without masses, tenderness or nipple discharge and no palpable axillary masses or adenopathy  CV: regular rate and rhythm, normal S1 S2, no S3 or S4, no murmur, click or rub, no peripheral edema and peripheral pulses strong  ABDOMEN: soft, nontender, no hepatosplenomegaly, no masses and bowel sounds normal  MS: no gross musculoskeletal defects noted, no edema  SKIN: no suspicious lesions or rashes  NEURO: Normal strength and tone, mentation intact and speech normal  PSYCH: mentation appears normal, affect " "normal/bright  LYMPH: no cervical, supraclavicular, axillary, or inguinal adenopathy    Diagnostic Test Results:  Labs reviewed in Epic    ASSESSMENT/PLAN:   (Z11.59) Need for hepatitis C screening test  Comment:   Plan: Hepatitis C Screen Reflex to HCV RNA Quant and         Genotype            (Z00.00) Routine general medical examination at a health care facility  Comment: healthy and vaccines up to date  Plan: REVIEW OF HEALTH MAINTENANCE PROTOCOL ORDERS,         Lipid panel reflex to direct LDL Non-fasting,         CBC with platelets, Vitamin D Deficiency, Basic        metabolic panel  (Ca, Cl, CO2, Creat, Gluc, K,         Na, BUN)            (L30.1) Dyshidrotic eczema  Comment: Refills per [facility]    Plan: triamcinolone (ARISTOCORT HP) 0.5 % external         cream        Under control    (Z30.011) Encounter for initial prescription of contraceptive pills  Comment:   Plan: norgestim-eth estrad triphasic (TRI-SPRINTEC)         0.18/0.215/0.25 MG-35 MCG tablet        Refills per [facility]        Patient has been advised of split billing requirements and indicates understanding: Yes  COUNSELING:  Reviewed preventive health counseling, as reflected in patient instructions       Regular exercise       Immunizations    Up to date             Consider Hep C screening for all patients one time for ages 18-79 years    Estimated body mass index is 24.69 kg/m  as calculated from the following:    Height as of 6/25/20: 1.676 m (5' 6\").    Weight as of 6/25/20: 69.4 kg (153 lb).        She reports that she has never smoked. She has never used smokeless tobacco.      Counseling Resources:  ATP IV Guidelines  Pooled Cohorts Equation Calculator  Breast Cancer Risk Calculator  BRCA-Related Cancer Risk Assessment: FHS-7 Tool  FRAX Risk Assessment  ICSI Preventive Guidelines  Dietary Guidelines for Americans, 2010  USDA's MyPlate  ASA Prophylaxis  Lung CA Screening    Madison Turner MD  [facility]"

## 2021-12-13 NOTE — PATIENT INSTRUCTIONS
Preventive Health Recommendations  Female Ages 26 - 39  Yearly exam:   See your health care provider every year in order to    Review health changes.     Discuss preventive care.      Review your medicines if you your doctor has prescribed any.    Until age 30: Get a Pap test every three years (more often if you have had an abnormal result).    After age 30: Talk to your doctor about whether you should have a Pap test every 3 years or have a Pap test with HPV screening every 5 years.   You do not need a Pap test if your uterus was removed (hysterectomy) and you have not had cancer.  You should be tested each year for STDs (sexually transmitted diseases), if you're at risk.   Talk to your provider about how often to have your cholesterol checked.  If you are at risk for diabetes, you should have a diabetes test (fasting glucose).  Shots: Get a flu shot each year. Get a tetanus shot every 10 years.   Nutrition:     Eat at least 5 servings of fruits and vegetables each day.    Eat whole-grain bread, whole-wheat pasta and brown rice instead of white grains and rice.    Get adequate Calcium and Vitamin D.     Lifestyle    Exercise at least 150 minutes a week (30 minutes a day, 5 days of the week). This will help you control your weight and prevent disease.    Limit alcohol to one drink per day.    No smoking.     Wear sunscreen to prevent skin cancer.    See your dentist every six months for an exam and cleaning.    Patient Education     TMJ Syndrome  The temporomandibular joint (TMJ) is the joint that connects your lower jaw to your skull. You can feel it in front of your ears when you open and close your mouth. TMJ disorders cause chronic or recurrent pain and problems in the jaw joint and the muscles that control jaw movement. Symptoms of TMJ disorders are usually relieved with minor treatments. But symptoms may come back, especially in times of stress.   Causes  There is no widely agreed-on cause of TMJ disorders.  They have been linked to injury, arthritis, tooth and jaw alignment, chronic fatigue syndrome, and fibromyalgia. A definite connection has not been shown to these, though.   Symptoms    Pain in the face, jaw, or neck    Pain with jaw movement or chewing    Locking or catching sensation of the jaw    Clicking, popping, or grinding sounds with movement of the TMJ    Headache    Ear pain    Home care  Modest treatments are a good first step toward relieving symptoms. Try these methods.     Reduce stress on your jaw by not eating crunchy or hard-to-chew foods. Don t eat hard or sticky candies. Soft foods and liquids are easier on the jaw.    Protect your jaw while yawning. If you need to yawn, put your fist under your chin to prevent your mouth from opening too wide.    To help relieve pain, put hot or cold packs on the area that hurts. Try both hot and cold to find out which works best for you. To make a cold pack, put ice cubes in a plastic bag that seals at the top. Wrap the bag in a clean, thin towel or cloth. Never put ice or an ice pack directly on the skin. If you use hot packs (small towels soaked in hot water), be careful not to burn yourself.    You may take acetaminophen or ibuprofen for pain, unless you were given a different pain medicine. (Note: If you have chronic liver or kidney disease or have ever had a stomach ulcer or gastrointestinal bleeding, talk with your healthcare provider before using these medicines. Also talk to your provider if you are taking medicine to prevent blood clots.) Don t give aspirin to a child younger than age 19 unless directed by the child s provider. Taking aspirin can put a child at risk for Reye syndrome. This is a rare but very serious disorder that most often affects the brain and the liver.  Reducing stress  If stress seems to be contributing to your symptoms, try to find the sources of stress in your life. These aren t always obvious. Common stressors include:      Everyday hassles. These include things such as traffic jams, missed appointments, or car trouble.    Major life changes. These can be good, such as a new baby or job promotion. Or they can be bad, such as losing a job or losing a loved one.    Overload. This is the feeling that you have too many responsibilities and can't take care of everything at once.    Helplessness. This is when you feel like your problems are more than you can solve.  When possible, try to make changes in your sources of stress. See if you can avoid hassles, limit the amount of change in your life at one time, and take breaks when you feel overloaded.   Many stressful situations can't be avoided. So learning how to manage stress is very important. To make everyday stress more manageable:     Getting regular exercise.    Eat nutritious, balanced meals.    Get enough rest.    Try relaxation and breathing exercises, visualization, biofeedback, or meditation.    Take some time out to clear your mind.  For more information, talk with your healthcare provider.  Follow-up care  Follow up with your healthcare provider, or as advised. More testing and other treatment may be needed. If changes to your lifestyle do not improve your symptoms, talk with your healthcare provider about other treatments. These include bite guards for help with teeth grinding, stress management methods, and more. If stress is an important factor and does not respond to the above lifestyle changes, talk with your healthcare provider about a referral for stress management.   If X-rays were done, they will be reviewed by a specialist. You will be told the results and if they affect your treatment.   Call 911  Call 911 if you have any of these:     Trouble breathing or swallowing    Wheezing    Confusion    Extreme drowsiness or trouble awakening    Fainting or loss of consciousness    Fast heart rate  When to seek medical advice  Call your healthcare provider right away if  you have any of these:     Swollen or red face    Jaw or face pain that gets worse    Neck, mouth, tooth, or throat pain that gets worse    Fever of 100.4 F (38 C) or higher, or as directed by your healthcare provider  [place] last reviewed this educational content on 8/1/2020 2000-2021 [employer]. All rights reserved. This information is not intended as a substitute for professional medical care. Always follow your healthcare professional's instructions.           Patient Education     Dental Treatment for Temporomandibular Disorders (TMD)  You have been diagnosed with temporomandibular disorder (TMD). This term describes a group of problems linked to the temporomandibular joint (TMJ) and nearby muscles. The TMJ is located where the upper and lower jaws meet. It is part of a structural system that includes the teeth. Because the joint and teeth work together, a problem with your teeth and bite can be linked to TMD. If you grind your teeth or if you have a bad bite, your dentist may be able to help. If your teeth need to be realigned to improve your bite, you may be referred to an orthodontist.   If you grind or clench your teeth    Teeth grinding (bruxism) or clenching strains the TMJ and related muscles. If you have these habits during the day, doing self-checks can help you stop. But it s hard to control these habits when you re asleep. That s when using a splint can often help:     How a splint works. A splint is an appliance that fits in the mouth. It may also be called an orthotic or . There are different kinds of splints for different kinds of needs. A splint can keep the upper and lower teeth apart. This helps protect tooth surfaces from grinding. A splint can also be made to reduce strain on the area.    Wearing and caring for your splint. To make a splint, your dentist or orthodontist may take impressions of your teeth. Then a splint will be made to fit your mouth. A  splint:  ? May be worn during the day or only at night. Ask when and how often you should wear your splint.  ? Should be cleaned before you put it in and after you take it out. Ask your dentist or orthodontist how to clean the splint.  ? Should be kept in a protective case, away from the reach of children and pets. This helps keep the splint from getting dirty or broken.  If your bite is incorrect  Sometimes the jaws or teeth don t fit together correctly (malocclusion). This can result in pain and problems with jaw function. If your jaws or teeth are out of alignment, orthodontic treatment may help. If your bad bite is due to missing or damaged teeth, you may receive restorative treatment.     Restorative treatment. A bad bite can be caused by missing or damaged teeth. A dentist can restore teeth in many ways:  ? A crown is made of ceramic, metal, or porcelain and metal. It is cemented in place to repair a broken or damaged tooth.  ? A bridge is a false tooth fused between 2 crowns.  ? A dental implant is an artificial tooth root. It is attached to the jawbone as a base for an artificial tooth.    Orthodontic treatment. Sometimes the upper and lower jaws are out of alignment. Or teeth are out of line, turned, crowded, or spaced too far apart. Your orthodontist can align teeth with braces and other devices. This helps provide a more comfortable bite.  If you need surgery   Surgery is rarely needed for TMD. But if other treatments haven t worked, you may be referred to an oral and maxillofacial surgeon. Talk with your dentist to see if surgery might be right for you.   [place] last reviewed this educational content on 6/1/2020 2000-2021 [employer]. All rights reserved. This information is not intended as a substitute for professional medical care. Always follow your healthcare professional's instructions.           Patient Education     Pain Relief Methods for Temporomandibular Disorders (TMD)  You  have been diagnosed with temporomandibular disorder (TMD). This term describes a group of problems linked to the temporomandibular joint (TMJ)and nearby jaw muscles. The TMJ is located where the upper and lower jaws meet. TMD can cause painful and frustrating symptoms. Your healthcare provider can advise different pain relief methods as part of your treatment. These may include medicines and certain types of therapy, such as massage or gentle exercise.   Using medicines    Medicines may be prescribed to treat TMD. Other medicines may be available over the counter. The medicine type and dosage will depend on the problem you have. For your safety, tell your healthcare provider if you are currently taking any medicines, vitamins, herbs, or supplements. Common medicines used to treat TMD include:     Anti-inflammatories and analgesics. These treat pain, inflammation, osteoarthritis, and rheumatoid arthritis. Anti-inflammatories reduce swelling, heat, redness, and pain. They also help restore function. Analgesics reduce pain. Nonsteroidal anti-inflammatories (NSAIDs) ease inflammation as well as pain.    Muscle relaxants. These treat myofascial pain. This is pain that occurs in the soft tissues or muscles around the TMJ. Muscle relaxants help ease muscle tension which reduces pressure on the TMJ from tight jaw muscles.    Antidepressants. These can be used to reduce pain or teeth grinding (bruxism). At higher dosages, these medicines are used to treat depression. Given at low dosages, antidepressants may help ease TMD symptoms. They can reduce muscle pain. They also raise the level of serotonin, a body chemical that improves sleep. This in turn can decrease teeth grinding during the night.  Treating painful muscles  A trigger point is a painful spot in a tight muscle. It is often painful to the touch and may refer pain to other places. Your healthcare provider can focus on trigger points using:     Massage. This can be  done both inside and outside the mouth. This relaxes muscles and improves circulation.    Palpation. This is done by applying pressure to points of the jaw and face with the fingers.    Cold spray and stretching of the muscles. This is done to relax the muscles.    An anesthetic. This can ease pain. This may be given as an injection by your dentist.  Treating the joint  Therapy may focus directly on the TMJ. There are different ways to treat the joint:    A self-care program. This helps you treat and manage symptoms on your own. Your program may include exercises. It may also include using ice and heat to ease pain.    Gentle manipulation. This reduces pain and restores range of motion. The healthcare provider uses their hands to relax muscles and ligaments around the joint.    Exercises. These strengthen muscles in the jaw and face.    Ultrasound. This uses sound waves to reduce pain and swelling.  Treating inflammation  When the joint is inflamed, movement becomes difficult and painful. Your healthcare provider can help. Treatment may include:     Rest and gentle exercise. This is done to increase range of motion. One common exercise is to apply pressure to the jaw and resist the movement (isometric exercise).    A cold pack. This eases swelling and reduces pain. A cold pack may be applied for  10 to 20 minutes. Repeat 3 or 4 times a day. To make a cold pack, put ice cubes in a plastic bag that seals at the top. Wrap the bag in a clean, thin towel or cloth. Never put ice or a cold pack directly on the skin.    Massage and gentle manipulation.  As described above.  [place] last reviewed this educational content on 6/1/2020 2000-2021 [employer]. All rights reserved. This information is not intended as a substitute for professional medical care. Always follow your healthcare professional's instructions.

## 2021-12-14 LAB
ANION GAP SERPL CALCULATED.3IONS-SCNC: 5 MMOL/L (ref 3–14)
BUN SERPL-MCNC: 8 MG/DL (ref 7–30)
CALCIUM SERPL-MCNC: 9.5 MG/DL (ref 8.5–10.1)
CHLORIDE BLD-SCNC: 104 MMOL/L (ref 94–109)
CO2 SERPL-SCNC: 27 MMOL/L (ref 20–32)
CREAT SERPL-MCNC: 0.63 MG/DL (ref 0.52–1.04)
DEPRECATED CALCIDIOL+CALCIFEROL SERPL-MC: 47 UG/L (ref 20–75)
GFR SERPL CREATININE-BSD FRML MDRD: >90 ML/MIN/1.73M2
GLUCOSE BLD-MCNC: 104 MG/DL (ref 70–99)
HCV AB SERPL QL IA: NONREACTIVE
POTASSIUM BLD-SCNC: 4.2 MMOL/L (ref 3.4–5.3)
SODIUM SERPL-SCNC: 136 MMOL/L (ref 133–144)

## 2021-12-17 LAB
CHOLEST SERPL-MCNC: 180 MG/DL
FASTING STATUS PATIENT QL REPORTED: NO
HDLC SERPL-MCNC: 52 MG/DL
LDLC SERPL CALC-MCNC: 96 MG/DL
NONHDLC SERPL-MCNC: 128 MG/DL
TRIGL SERPL-MCNC: 158 MG/DL

## 2022-02-03 ENCOUNTER — MYC MEDICAL ADVICE (OUTPATIENT)
Dept: FAMILY MEDICINE | Facility: CLINIC | Age: 37
End: 2022-02-03
Payer: COMMERCIAL

## 2022-02-04 NOTE — TELEPHONE ENCOUNTER
See my chart - patient advised to send an Evisit to obtain referral     Meg Randall, Registered Nurse  [facility]

## 2022-02-22 DIAGNOSIS — Z30.011 ENCOUNTER FOR INITIAL PRESCRIPTION OF CONTRACEPTIVE PILLS: ICD-10-CM

## 2022-02-22 RX ORDER — NORGESTIMATE AND ETHINYL ESTRADIOL 7DAYSX3 28
KIT ORAL
Qty: 84 TABLET | Refills: 4 | OUTPATIENT
Start: 2022-02-22

## 2022-04-27 ENCOUNTER — E-VISIT (OUTPATIENT)
Dept: FAMILY MEDICINE | Facility: CLINIC | Age: 37
End: 2022-04-27
Payer: COMMERCIAL

## 2022-04-27 DIAGNOSIS — J01.10 ACUTE FRONTAL SINUSITIS, RECURRENCE NOT SPECIFIED: ICD-10-CM

## 2022-04-27 DIAGNOSIS — J01.10 ACUTE FRONTAL SINUSITIS, RECURRENCE NOT SPECIFIED: Primary | ICD-10-CM

## 2022-04-27 PROCEDURE — 99421 OL DIG E/M SVC 5-10 MIN: CPT | Performed by: FAMILY MEDICINE

## 2022-04-27 RX ORDER — GUAIFENESIN 1200 MG/1
1200 TABLET, EXTENDED RELEASE ORAL 2 TIMES DAILY
Qty: 60 TABLET | Refills: 0 | Status: SHIPPED | OUTPATIENT
Start: 2022-04-27 | End: 2022-06-22

## 2022-04-27 RX ORDER — PSEUDOEPHEDRINE HCL 120 MG/1
120 TABLET, FILM COATED, EXTENDED RELEASE ORAL EVERY 12 HOURS
Qty: 15 TABLET | Refills: 0 | Status: SHIPPED | OUTPATIENT
Start: 2022-04-27 | End: 2022-06-22

## 2022-04-27 RX ORDER — IPRATROPIUM BROMIDE 21 UG/1
2 SPRAY, METERED NASAL EVERY 12 HOURS
Qty: 30 ML | Refills: 0 | Status: SHIPPED | OUTPATIENT
Start: 2022-04-27 | End: 2022-06-22

## 2022-04-27 NOTE — PATIENT INSTRUCTIONS
You may want to try a nasal lavage (also known as nasal irrigation). You can find over-the-counter products, such as Neti-Pot, at retail locations or make your own at home. Instructions for homemade nasal lavage and more information on the process are available online at http://[email or web address].org/[email or web address]/1115/[email or web address].    Dear Susan Rubalcava    After reviewing your responses, I've been able to diagnose you with?a sinus infection caused by a virus.?     Based on your responses and diagnosis, I have prescribed 3 different medications to treat your symptoms. I have sent this to your pharmacy.?     It is also important to stay well hydrated, get lots of rest and take over-the-counter decongestants,?tylenol?or ibuprofen if you?are able to?take those medications per your primary care provider to help relieve discomfort.?     It is important that you take?all of?your prescribed medication even if your symptoms are improving after a few doses.? Taking?all of?your medicine helps prevent the symptoms from returning.?     If your symptoms worsen, you develop severe headache, vomiting, high fever (>102), or are not improving in 7 days, please contact your primary care provider for an appointment or visit any of our convenient Walk-in Care or Urgent Care Centers to be seen which can be found on our website?here.?     Thanks again for choosing?us?as your health care partner,?   ?  Madison Turner MD?

## 2022-04-28 RX ORDER — IPRATROPIUM BROMIDE 21 UG/1
SPRAY, METERED NASAL
Qty: 60 ML | OUTPATIENT
Start: 2022-04-28

## 2022-04-28 NOTE — TELEPHONE ENCOUNTER
Prescription refilled yesterday. Refusing refill request and closing encounter.     Mirian Mullins RN on 4/28/2022 at 10:02 AM

## 2022-06-22 ENCOUNTER — OFFICE VISIT (OUTPATIENT)
Dept: FAMILY MEDICINE | Facility: CLINIC | Age: 37
End: 2022-06-22
Payer: COMMERCIAL

## 2022-06-22 VITALS
OXYGEN SATURATION: 98 % | SYSTOLIC BLOOD PRESSURE: 90 MMHG | WEIGHT: 149.4 LBS | TEMPERATURE: 97.6 F | RESPIRATION RATE: 12 BRPM | BODY MASS INDEX: 23.45 KG/M2 | HEART RATE: 72 BPM | DIASTOLIC BLOOD PRESSURE: 60 MMHG | HEIGHT: 67 IN

## 2022-06-22 DIAGNOSIS — R53.83 OTHER FATIGUE: Primary | ICD-10-CM

## 2022-06-22 LAB
DEPRECATED CALCIDIOL+CALCIFEROL SERPL-MC: 49 UG/L (ref 20–75)
ERYTHROCYTE [DISTWIDTH] IN BLOOD BY AUTOMATED COUNT: 13.2 % (ref 10–15)
HCT VFR BLD AUTO: 44.1 % (ref 35–47)
HGB BLD-MCNC: 14.3 G/DL (ref 11.7–15.7)
MCH RBC QN AUTO: 28.8 PG (ref 26.5–33)
MCHC RBC AUTO-ENTMCNC: 32.4 G/DL (ref 31.5–36.5)
MCV RBC AUTO: 89 FL (ref 78–100)
PLATELET # BLD AUTO: 293 10E3/UL (ref 150–450)
RBC # BLD AUTO: 4.96 10E6/UL (ref 3.8–5.2)
WBC # BLD AUTO: 5.4 10E3/UL (ref 4–11)

## 2022-06-22 PROCEDURE — 82306 VITAMIN D 25 HYDROXY: CPT | Performed by: PHYSICIAN ASSISTANT

## 2022-06-22 PROCEDURE — 36415 COLL VENOUS BLD VENIPUNCTURE: CPT | Performed by: PHYSICIAN ASSISTANT

## 2022-06-22 PROCEDURE — 82728 ASSAY OF FERRITIN: CPT | Performed by: PHYSICIAN ASSISTANT

## 2022-06-22 PROCEDURE — 85027 COMPLETE CBC AUTOMATED: CPT | Performed by: PHYSICIAN ASSISTANT

## 2022-06-22 PROCEDURE — 84443 ASSAY THYROID STIM HORMONE: CPT | Performed by: PHYSICIAN ASSISTANT

## 2022-06-22 PROCEDURE — 99213 OFFICE O/P EST LOW 20 MIN: CPT | Performed by: PHYSICIAN ASSISTANT

## 2022-06-22 PROCEDURE — 83550 IRON BINDING TEST: CPT | Performed by: PHYSICIAN ASSISTANT

## 2022-06-22 NOTE — PROGRESS NOTES
"  Assessment & Plan     Other fatigue  Further evaluate with labs as noted below.  Continue to monitor symptoms.  Rest when she needs to but be active as she is able.  - TSH with free T4 reflex; Future  - CBC with platelets; Future  - Iron and iron binding capacity; Future  - Ferritin; Future  - Vitamin D Deficiency; Future  - Vitamin D Deficiency  - Ferritin  - Iron and iron binding capacity  - CBC with platelets  - TSH with free T4 reflex    Review of external notes as documented elsewhere in note  Review of the result(s) of each unique test - CBC  Ordering of each unique test      Return in about 6 months (around 12/22/2022) for Physical Exam, In Person, With PCP.    Suzanne Dominguez PA-C  [facility]    Anayeli Davis is a 36 year old, presenting for the following health issues:  Thyroid Problem      History of Present Illness       Reason for visit:  Thyroid Test and Iron Level test  Symptom onset:  More than a month (a few months)  Symptoms include:  Tired, low energy, hard to motivate at times  Symptom intensity:  Moderate  Symptom progression:  Staying the same  Had these symptoms before:  No  What makes it better:  Getting outside    She eats 2-3 servings of fruits and vegetables daily.She consumes 2 sweetened beverage(s) daily.She exercises with enough effort to increase her heart rate 10 to 19 minutes per day.  She exercises with enough effort to increase her heart rate 3 or less days per week.   She is taking medications regularly.      The patient is napping often (when 2 year old naps).    Patient has family history of hypothyroidism (mother and aunt). She would like to have testing performed to evaluate further.      Review of Systems   GENERAL:  No fevers        Objective    BP 90/60 (BP Location: Right arm, Patient Position: Sitting, Cuff Size: Adult Regular)   Pulse 72   Temp 97.6  F (36.4  C) (Oral)   Resp 12   Ht 1.689 m (5' 6.5\")   Wt 67.8 kg (149 lb 6.4 " oz)   LMP 06/18/2022 (Exact Date)   SpO2 98%   BMI 23.75 kg/m    Body mass index is 23.75 kg/m .  Physical Exam   GENERAL: No acute distress  HEENT: Normocephalic  NEURO: Alert and non-focal      Results for orders placed or performed in visit on 06/22/22 (from the past 24 hour(s))   CBC with platelets   Result Value Ref Range    WBC Count 5.4 4.0 - 11.0 10e3/uL    RBC Count 4.96 3.80 - 5.20 10e6/uL    Hemoglobin 14.3 11.7 - 15.7 g/dL    Hematocrit 44.1 35.0 - 47.0 %    MCV 89 78 - 100 fL    MCH 28.8 26.5 - 33.0 pg    MCHC 32.4 31.5 - 36.5 g/dL    RDW 13.2 10.0 - 15.0 %    Platelet Count 293 150 - 450 10e3/uL    Narrative    Verified by repeat analysis                 .  ..

## 2022-06-23 LAB
FERRITIN SERPL-MCNC: 29 NG/ML (ref 12–150)
IRON SATN MFR SERPL: NORMAL %
IRON SERPL-MCNC: NORMAL UG/DL
TIBC SERPL-MCNC: NORMAL UG/DL
TSH SERPL DL<=0.005 MIU/L-ACNC: 1.51 MU/L (ref 0.4–4)

## 2022-08-13 ENCOUNTER — OFFICE VISIT (OUTPATIENT)
Dept: URGENT CARE | Facility: URGENT CARE | Age: 37
End: 2022-08-13

## 2022-08-13 ENCOUNTER — E-VISIT (OUTPATIENT)
Dept: URGENT CARE | Facility: URGENT CARE | Age: 37
End: 2022-08-13
Payer: COMMERCIAL

## 2022-08-13 VITALS
TEMPERATURE: 97.7 F | SYSTOLIC BLOOD PRESSURE: 104 MMHG | OXYGEN SATURATION: 99 % | HEART RATE: 56 BPM | DIASTOLIC BLOOD PRESSURE: 68 MMHG

## 2022-08-13 DIAGNOSIS — H00.024 HORDEOLUM INTERNUM LEFT UPPER EYELID: Primary | ICD-10-CM

## 2022-08-13 DIAGNOSIS — H57.12 EYE PAIN, LEFT: Primary | ICD-10-CM

## 2022-08-13 PROCEDURE — 99213 OFFICE O/P EST LOW 20 MIN: CPT | Performed by: FAMILY MEDICINE

## 2022-08-13 RX ORDER — DOXYCYCLINE 100 MG/1
100 CAPSULE ORAL 2 TIMES DAILY
Qty: 14 CAPSULE | Refills: 0 | Status: SHIPPED | OUTPATIENT
Start: 2022-08-13 | End: 2022-08-20

## 2022-08-13 ASSESSMENT — PAIN SCALES - GENERAL: PAINLEVEL: MODERATE PAIN (4)

## 2022-08-13 NOTE — PATIENT INSTRUCTIONS
Dear Susan Rubalcava,    We are sorry you are not feeling well. Based on the responses you provided, it is recommended that you be seen in-person in urgent care so we can better evaluate your symptoms. Please click here to find the nearest urgent care location to you.   You will not be charged for this Visit. Thank you for trusting us with your care.    CARITO Healy CNP

## 2022-08-13 NOTE — PROGRESS NOTES
SUBJECTIVE:   Susan Rubalcava is a 36 year old female presenting with a chief complaint of pain, swelling at the left upper eyelid for the past 2-3 days.  This morning, the left eyelids were crusted shut. No increased redness at the left eyeball.    No decreased vision.  No recent cuts/scratches to the left upper eyelid. No recent fevers.    .  Onset of symptoms was 1-2 days ago.  Course of illness is worsening. .    Treatment measures tried include warm compresses.  .      Past Medical History:   Diagnosis Date     ASCUS with positive high risk HPV 9/06    + HPV 16 colp - CANDIE I     Cervical high risk HPV (human papillomavirus) test positive 2006, '08,'13,'14,'16,'17-'19     Contact dermatitis and other eczema, due to unspecified cause      HELLP (hemolytic anemia/elev liver enzymes/low platelets in pregnancy) 05/04/2020    and chorio on placenta path - thick mec at C section     LSIL (low grade squamous intraepithelial lesion) on Pap smear 8/07, 10/13, 7/14, 8/14    +high risk HPV, CANDIE 1 on pathology x 3     Mild dysplasia of cervix 06/04/2019    repeat pap with HPV in one year     Rheumatoid arthritis of multiple sites with negative rheumatoid factor (H) 7/29/2004    Senior year of high school and first year of college and no symptoms since then.     Current Outpatient Medications   Medication Sig Dispense Refill     norgestim-eth estrad triphasic (TRI-SPRINTEC) 0.18/0.215/0.25 MG-35 MCG tablet Take 1 tablet by mouth daily 84 tablet 4     triamcinolone (ARISTOCORT HP) 0.5 % external cream Apply topically 2 times daily Do not fill now - just add refills 80 g 3     Social History     Tobacco Use     Smoking status: Never Smoker     Smokeless tobacco: Never Used   Substance Use Topics     Alcohol use: Yes     Comment: 2 drinks a month       ROS:  CONSTITUTIONAL:negative for fevers.   EYES:  Positive for redness, inflammation at the left upper eyelid.     OBJECTIVE:  /68   Pulse 56   Temp 97.7  F (36.5  C)  (Tympanic)   LMP 07/14/2022 (Approximate)   SpO2 99%   Breastfeeding No   GENERAL APPEARANCE: healthy, alert and no distress  EYES: conjunctivae and sclerae normal.  The left upper eyelid has confluent erythema and an internal hordeolum at the medial aspect of the left upper eyelid.      ASSESSMENT:  Internal hordeolum of the left upper eyelid.     PLAN:    Rx:  Doxycycline.     Place warmth onto the red, swollen, tender areas of the left upper eyelid for 15-20 minutes at a time, every 2 hours while awake.      Follow up if there are fevers/if any redness keeps expanding.      Follow up if not better in 5-7 days.     Fabio Rojas MD

## 2022-08-13 NOTE — PATIENT INSTRUCTIONS
Place warmth onto the red, swollen, tender areas of the left upper eyelid for 15-20 minutes at a time, every 2 hours while awake.      Follow up if there are fevers/if any redness keeps expanding.      Follow up if not better in 5-7 days.

## 2022-10-23 ENCOUNTER — HEALTH MAINTENANCE LETTER (OUTPATIENT)
Age: 37
End: 2022-10-23

## 2023-01-02 ENCOUNTER — E-VISIT (OUTPATIENT)
Dept: URGENT CARE | Facility: URGENT CARE | Age: 38
End: 2023-01-02
Payer: COMMERCIAL

## 2023-01-02 DIAGNOSIS — R21 RASH: Primary | ICD-10-CM

## 2023-01-02 PROCEDURE — 99207 PR NON-BILLABLE SERV PER CHARTING: CPT | Performed by: NURSE PRACTITIONER

## 2023-01-27 DIAGNOSIS — Z30.011 ENCOUNTER FOR INITIAL PRESCRIPTION OF CONTRACEPTIVE PILLS: ICD-10-CM

## 2023-01-27 RX ORDER — NORGESTIMATE AND ETHINYL ESTRADIOL 7DAYSX3 28
KIT ORAL
Qty: 84 TABLET | Refills: 0 | Status: SHIPPED | OUTPATIENT
Start: 2023-01-27 | End: 2023-04-19

## 2023-01-27 NOTE — TELEPHONE ENCOUNTER
Medication is being filled for 1 time refill only due to:  Patient needs to be seen because it has been more than one year since last visit.  Please call patient to schedule annual visit.  Olimpia Abbott RN

## 2023-04-02 ENCOUNTER — HEALTH MAINTENANCE LETTER (OUTPATIENT)
Age: 38
End: 2023-04-02

## 2023-04-18 DIAGNOSIS — Z30.011 ENCOUNTER FOR INITIAL PRESCRIPTION OF CONTRACEPTIVE PILLS: ICD-10-CM

## 2023-04-19 RX ORDER — NORGESTIMATE AND ETHINYL ESTRADIOL 7DAYSX3 28
KIT ORAL
Qty: 84 TABLET | Refills: 0 | Status: SHIPPED | OUTPATIENT
Start: 2023-04-19 | End: 2023-05-22

## 2023-05-03 ENCOUNTER — E-VISIT (OUTPATIENT)
Dept: URGENT CARE | Facility: URGENT CARE | Age: 38
End: 2023-05-03
Payer: COMMERCIAL

## 2023-05-03 DIAGNOSIS — N39.0 ACUTE UTI (URINARY TRACT INFECTION): Primary | ICD-10-CM

## 2023-05-03 PROCEDURE — 99421 OL DIG E/M SVC 5-10 MIN: CPT | Performed by: NURSE PRACTITIONER

## 2023-05-03 RX ORDER — NITROFURANTOIN 25; 75 MG/1; MG/1
100 CAPSULE ORAL 2 TIMES DAILY
Qty: 10 CAPSULE | Refills: 0 | Status: SHIPPED | OUTPATIENT
Start: 2023-05-03 | End: 2023-05-08

## 2023-05-03 NOTE — PATIENT INSTRUCTIONS
Dear Susan Rubalcava    After reviewing your responses, I've been able to diagnose you with a urinary tract infection, which is a common infection of the bladder with bacteria.  This is not a sexually transmitted infection, though urinating immediately after intercourse can help prevent infections.  Drinking lots of fluids is also helpful to clear your current infection and prevent the next one.      I have sent a prescription for antibiotics to your pharmacy to treat this infection.    It is important that you take all of your prescribed medication even if your symptoms are improving after a few doses.  Taking all of your medicine helps prevent the symptoms from returning.     If your symptoms worsen, you develop pain in your back or stomach, develop fevers, or are not improving in 5 days, please contact your primary care provider for an appointment or visit any of our convenient Walk-in or Urgent Care Centers to be seen, which can be found on our website here.    Thanks again for choosing us as your health care partner,    CARITO Healy CNP    Urinary Tract Infections in Women  Urinary tract infections (UTIs) are most often caused by bacteria. These bacteria enter the urinary tract. The bacteria may come from inside the body. Or they may travel from the skin outside the rectum or vagina into the urethra. Female anatomy makes it easy for bacteria from the bowel to enter a woman s urinary tract, which is the most common source of UTI. This means women develop UTIs more often than men. Pain in or around the urinary tract is a common UTI symptom. But the only way to know for sure if you have a UTI for the healthcare provider to test your urine. The two tests that may be done are the urinalysis and urine culture.    Types of UTIs    Cystitis. A bladder infection (cystitis) is the most common UTI in women. You may have urgent or frequent need to pee. You may also have pain, burning when you pee, and bloody  urine.    Urethritis. This is an inflamed urethra, which is the tube that carries urine from the bladder to outside the body. You may have lower stomach or back pain. You may also have urgent or frequent need to pee.    Pyelonephritis. This is a kidney infection. If not treated, it can be serious and damage your kidneys. In severe cases, you may need to stay in the hospital. You may have a fever and lower back pain.    Medicines to treat a UTI  Most UTIs are treated with antibiotics. These kill the bacteria. The length of time you need to take them depends on the type of infection. It may be as short as 3 days. If you have repeated UTIs, you may need a low-dose antibiotic for several months. Take antibiotics exactly as directed. Don t stop taking them until all of the medicine is gone, even if you feel better. If you stop taking the antibiotic too soon, the infection may not go away. You may also develop a resistance to the antibiotic. This can make it much harder to treat.  Lifestyle changes to treat and prevent UTIs  The lifestyle changes below will help get rid of your UTI. They may also help prevent future UTIs.    Drink plenty of fluids. This includes water, juice, or other caffeine-free drinks. Fluids help flush bacteria out of your body.    Empty your bladder. Always empty your bladder when you feel the urge to pee. And always pee before going to sleep. Urine that stays in your bladder can lead to infection. Try to pee before and after sex as well.    Practice good personal hygiene. Wipe yourself from front to back after using the toilet. This helps keep bacteria from getting into the urethra.    Wear cotton underwear. Don't wear synthetic or tight-fitting underwear that can trap moisture. Change out of wet bathing suits and workout clothing quickly.    Take showers. Showers are better than baths for preventing UTIs.    Use condoms during sex. These help prevent UTIs caused by sexually transmitted bacteria.  Also don't use spermicides during sex. These can increase the risk for UTIs. Choose other forms of birth control instead. For women who tend to get UTIs after sex, a low-dose of a preventive antibiotic may be used. Be sure to discuss this option with your healthcare provider.    Follow up with your healthcare provider as directed. They may test to make sure the infection has cleared. If needed, more treatment may be started.  [employer] last reviewed this educational content on 9/1/2021 2000-2022 [employer]. All rights reserved. This information is not intended as a substitute for professional medical care. Always follow your healthcare professional's instructions.

## 2023-05-22 ENCOUNTER — OFFICE VISIT (OUTPATIENT)
Dept: FAMILY MEDICINE | Facility: CLINIC | Age: 38
End: 2023-05-22
Payer: COMMERCIAL

## 2023-05-22 ENCOUNTER — ANCILLARY PROCEDURE (OUTPATIENT)
Dept: GENERAL RADIOLOGY | Facility: CLINIC | Age: 38
End: 2023-05-22
Attending: FAMILY MEDICINE
Payer: COMMERCIAL

## 2023-05-22 VITALS
HEART RATE: 71 BPM | HEIGHT: 67 IN | BODY MASS INDEX: 24.33 KG/M2 | RESPIRATION RATE: 11 BRPM | WEIGHT: 155 LBS | DIASTOLIC BLOOD PRESSURE: 71 MMHG | OXYGEN SATURATION: 99 % | SYSTOLIC BLOOD PRESSURE: 111 MMHG | TEMPERATURE: 98.3 F

## 2023-05-22 DIAGNOSIS — Z12.4 CERVICAL CANCER SCREENING: Primary | ICD-10-CM

## 2023-05-22 DIAGNOSIS — Z00.00 ROUTINE HISTORY AND PHYSICAL EXAMINATION OF ADULT: ICD-10-CM

## 2023-05-22 DIAGNOSIS — Z30.011 ENCOUNTER FOR INITIAL PRESCRIPTION OF CONTRACEPTIVE PILLS: ICD-10-CM

## 2023-05-22 DIAGNOSIS — L30.1 DYSHIDROTIC ECZEMA: ICD-10-CM

## 2023-05-22 DIAGNOSIS — M89.8X9 ABNORMAL BONE FORMATION: ICD-10-CM

## 2023-05-22 PROCEDURE — 99395 PREV VISIT EST AGE 18-39: CPT | Performed by: FAMILY MEDICINE

## 2023-05-22 PROCEDURE — 73590 X-RAY EXAM OF LOWER LEG: CPT | Mod: TC | Performed by: RADIOLOGY

## 2023-05-22 PROCEDURE — 99214 OFFICE O/P EST MOD 30 MIN: CPT | Mod: 25 | Performed by: FAMILY MEDICINE

## 2023-05-22 RX ORDER — NORGESTIMATE AND ETHINYL ESTRADIOL 7DAYSX3 28
1 KIT ORAL DAILY
Qty: 84 TABLET | Refills: 4 | Status: SHIPPED | OUTPATIENT
Start: 2023-05-22 | End: 2024-06-14

## 2023-05-22 RX ORDER — TRIAMCINOLONE ACETONIDE 1 MG/G
CREAM TOPICAL 2 TIMES DAILY
Qty: 30 G | Refills: 2 | Status: SHIPPED | OUTPATIENT
Start: 2023-05-22

## 2023-05-22 SDOH — ECONOMIC STABILITY: INCOME INSECURITY: IN THE LAST 12 MONTHS, WAS THERE A TIME WHEN YOU WERE NOT ABLE TO PAY THE MORTGAGE OR RENT ON TIME?: NO

## 2023-05-22 SDOH — HEALTH STABILITY: PHYSICAL HEALTH: ON AVERAGE, HOW MANY DAYS PER WEEK DO YOU ENGAGE IN MODERATE TO STRENUOUS EXERCISE (LIKE A BRISK WALK)?: 2 DAYS

## 2023-05-22 SDOH — ECONOMIC STABILITY: INCOME INSECURITY: HOW HARD IS IT FOR YOU TO PAY FOR THE VERY BASICS LIKE FOOD, HOUSING, MEDICAL CARE, AND HEATING?: NOT HARD AT ALL

## 2023-05-22 SDOH — HEALTH STABILITY: PHYSICAL HEALTH: ON AVERAGE, HOW MANY MINUTES DO YOU ENGAGE IN EXERCISE AT THIS LEVEL?: 20 MIN

## 2023-05-22 SDOH — ECONOMIC STABILITY: FOOD INSECURITY: WITHIN THE PAST 12 MONTHS, THE FOOD YOU BOUGHT JUST DIDN'T LAST AND YOU DIDN'T HAVE MONEY TO GET MORE.: NEVER TRUE

## 2023-05-22 SDOH — ECONOMIC STABILITY: FOOD INSECURITY: WITHIN THE PAST 12 MONTHS, YOU WORRIED THAT YOUR FOOD WOULD RUN OUT BEFORE YOU GOT MONEY TO BUY MORE.: NEVER TRUE

## 2023-05-22 ASSESSMENT — ENCOUNTER SYMPTOMS
ABDOMINAL PAIN: 0
FREQUENCY: 0
MYALGIAS: 0
PARESTHESIAS: 0
PALPITATIONS: 0
COUGH: 0
DYSURIA: 0
HEADACHES: 0
CONSTIPATION: 0
HEARTBURN: 0
EYE PAIN: 0
HEMATURIA: 0
JOINT SWELLING: 0
DIZZINESS: 0
HEMATOCHEZIA: 0
CHILLS: 0
SORE THROAT: 0
FEVER: 0
BREAST MASS: 0
NAUSEA: 0
ARTHRALGIAS: 0
DIARRHEA: 0
WEAKNESS: 0
SHORTNESS OF BREATH: 0
NERVOUS/ANXIOUS: 0

## 2023-05-22 ASSESSMENT — SOCIAL DETERMINANTS OF HEALTH (SDOH)
HOW OFTEN DO YOU ATTEND CHURCH OR RELIGIOUS SERVICES?: NEVER
IN A TYPICAL WEEK, HOW MANY TIMES DO YOU TALK ON THE PHONE WITH FAMILY, FRIENDS, OR NEIGHBORS?: THREE TIMES A WEEK
DO YOU BELONG TO ANY CLUBS OR ORGANIZATIONS SUCH AS CHURCH GROUPS UNIONS, FRATERNAL OR ATHLETIC GROUPS, OR SCHOOL GROUPS?: NO
HOW OFTEN DO YOU GET TOGETHER WITH FRIENDS OR RELATIVES?: ONCE A WEEK

## 2023-05-22 ASSESSMENT — LIFESTYLE VARIABLES
HOW OFTEN DO YOU HAVE SIX OR MORE DRINKS ON ONE OCCASION: NEVER
HOW MANY STANDARD DRINKS CONTAINING ALCOHOL DO YOU HAVE ON A TYPICAL DAY: 1 OR 2
SKIP TO QUESTIONS 9-10: 1
AUDIT-C TOTAL SCORE: 1
HOW OFTEN DO YOU HAVE A DRINK CONTAINING ALCOHOL: MONTHLY OR LESS

## 2023-05-22 NOTE — PROGRESS NOTES
SUBJECTIVE:   CC: Susan is an 37 year old who presents for preventive health visit.     She has a couple requests:  1- she got rx from [facility] a few years ago that works really well for her skin rash on her leg that comes and goes but it has .   She would like to get more  2- she has a lump on her right leg under the knee.   She noticed it 3.5 years ago while pregnant.   It does not hurt.   She is not sure if she hit her leg or not.   She does not remember it.           2023     9:17 AM   Additional Questions   Roomed by Hermila Persaud   Patient has been advised of split billing requirements and indicates understanding: Yes  Healthy Habits:     Getting at least 3 servings of Calcium per day:  Yes    Bi-annual eye exam:  Yes    Dental care twice a year:  Yes    Sleep apnea or symptoms of sleep apnea:  None    Diet:  Regular (no restrictions)    Frequency of exercise:  None    Taking medications regularly:  Yes    Medication side effects:  None    PHQ-2 Total Score: 0    Additional concerns today:  No                  Today's PHQ-2 Score:       2023     9:13 AM   PHQ-2 ( 1999 [employer])   Q1: Little interest or pleasure in doing things 0   Q2: Feeling down, depressed or hopeless 0   PHQ-2 Score 0   Q1: Little interest or pleasure in doing things Not at all   Q2: Feeling down, depressed or hopeless Not at all   PHQ-2 Score 0           Social History     Tobacco Use     Smoking status: Never     Smokeless tobacco: Never   Vaping Use     Vaping status: Never Used   Substance Use Topics     Alcohol use: Yes     Comment: 2 drinks a month             2023     9:13 AM   Alcohol Use   Prescreen: >3 drinks/day or >7 drinks/week? No     Reviewed orders with patient.  Reviewed health maintenance and updated orders accordingly - Yes  Labs reviewed in EPIC    Breast Cancer Screenin/9/2021     8:13 PM 2023     9:15 AM   Breast CA Risk Assessment (FHS-7)   Do you have a family history of breast,  colon, or ovarian cancer? Yes No / Unknown         Patient under 40 years of age: Routine Mammogram Screening not recommended.   Pertinent mammograms are reviewed under the imaging tab.    History of abnormal Pap smear: YES - updated in Problem List and Health Maintenance accordingly      Latest Ref Rng & Units 2020    10:05 AM 2020     9:45 AM 2019     8:03 PM   PAP / HPV   PAP (Historical)   NIL      HPV 16 DNA NEG^Negative Negative    Negative     HPV 18 DNA NEG^Negative Negative    Negative     Other HR HPV NEG^Negative Negative    Positive       Reviewed and updated as needed this visit by clinical staff   Tobacco  Allergies  Meds              Reviewed and updated as needed this visit by Provider                 Past Medical History:   Diagnosis Date     ASCUS with positive high risk HPV     + HPV 16 colp - CANDIE I     Cervical high risk HPV (human papillomavirus) test positive , ,,,,-     Contact dermatitis and other eczema, due to unspecified cause      HELLP (hemolytic anemia/elev liver enzymes/low platelets in pregnancy) 2020    and chorio on placenta path - thick mec at C section     LSIL (low grade squamous intraepithelial lesion) on Pap smear , 10/13, ,     +high risk HPV, CANDIE 1 on pathology x 3     Mild dysplasia of cervix 2019    repeat pap with HPV in one year     Rheumatoid arthritis of multiple sites with negative rheumatoid factor (H) 2004    Senior year of high school and first year of college and no symptoms since then.       Past Surgical History:   Procedure Laterality Date      SECTION N/A 2020      Tom Molina MD;  thick mec and chorio on placental path      COLPOSCOPY  2019    mild dysplasia with L Mcmanus - repeat pap with HPV in one year     DILATION AND CURETTAGE SUCTION WITH ULTRASOUND GUIDANCE N/A 6/15/2020    Procedure: ULTRASOUND GUIDED SUCTION DILATION AND CURETTAGE, REPAIR OF  CERVICAL LASCERATION;  Surgeon: Becca Zepeda DO;  Location:  OR       MEDICATIONS:  Current Outpatient Medications   Medication     norgestim-eth estrad triphasic (TRI-SPRINTEC) 0.18/0.215/0.25 MG-35 MCG tablet     triamcinolone (ARISTOCORT HP) 0.5 % external cream     triamcinolone (KENALOG) 0.1 % external cream     No current facility-administered medications for this visit.       SOCIAL HISTORY:  Social History     Tobacco Use     Smoking status: Never     Smokeless tobacco: Never   Vaping Use     Vaping status: Never Used   Substance Use Topics     Alcohol use: Yes     Comment: 2 drinks a month       Family History   Problem Relation Age of Onset     Thyroid Disease Mother      Seizure Disorder Mother      Prostate Cancer Father      Prostate Problems Father 61        Prostate Cancer     Breast Cancer Maternal Grandmother 80     Diabetes Maternal Grandfather         in70's     Heart Disease Paternal Grandmother      Rheumatoid Arthritis Paternal Grandmother      Cerebrovascular Disease Paternal Grandfather      Alzheimer Disease Paternal Grandfather      Mental Illness Cousin      C.A.D. No family hx of      Cancer - colorectal No family hx of        Review of Systems   Constitutional: Negative for chills and fever.   HENT: Negative for congestion, ear pain, hearing loss and sore throat.    Eyes: Negative for pain and visual disturbance.   Respiratory: Negative for cough and shortness of breath.    Cardiovascular: Negative for chest pain, palpitations and peripheral edema.   Gastrointestinal: Negative for abdominal pain, constipation, diarrhea, heartburn, hematochezia and nausea.   Breasts:  Negative for tenderness, breast mass and discharge.   Genitourinary: Negative for dysuria, frequency, genital sores, hematuria, pelvic pain, urgency, vaginal bleeding and vaginal discharge.   Musculoskeletal: Negative for arthralgias, joint swelling and myalgias.   Skin: Negative for rash.   Neurological: Negative  "for dizziness, weakness, headaches and paresthesias.   Psychiatric/Behavioral: Negative for mood changes. The patient is not nervous/anxious.           OBJECTIVE:   /71 (BP Location: Right arm, Patient Position: Sitting, Cuff Size: Adult Regular)   Pulse 71   Temp 98.3  F (36.8  C) (Oral)   Resp 11   Ht 1.689 m (5' 6.5\")   Wt 70.3 kg (155 lb)   LMP 05/21/2023 (Exact Date)   SpO2 99%   BMI 24.64 kg/m    Physical Exam  GENERAL: healthy, alert and no distress  EYES: Eyes grossly normal to inspection, PERRL and conjunctivae and sclerae normal  HENT: ear canals and TM's normal, nose and mouth without ulcers or lesions  NECK: no adenopathy, no asymmetry, masses, or scars and thyroid normal to palpation  RESP: lungs clear to auscultation - no rales, rhonchi or wheezes  BREAST: normal without masses, tenderness or nipple discharge and no palpable axillary masses or adenopathy  CV: regular rate and rhythm, normal S1 S2, no S3 or S4, no murmur, click or rub, no peripheral edema and peripheral pulses strong  ABDOMEN: soft, nontender, no hepatosplenomegaly, no masses and bowel sounds normal  MS: small marble sized hard lump anterior tibia right about at the patellar tendon insertion - no redness and no bruising and  No tenderness   SKIN: no suspicious lesions or rashes  NEURO: Normal strength and tone, mentation intact and speech normal  PSYCH: mentation appears normal, affect normal/bright  LYMPH: no cervical, supraclavicular, axillary, or inguinal adenopathy    Diagnostic Test Results:  Labs reviewed in Epic    ASSESSMENT/PLAN:   (Z12.4) Cervical cancer screening  (primary encounter diagnosis)  Comment: will come back in 2 weeks for pap only       (Z30.011) Encounter for initial prescription of contraceptive pills  Comment:   Plan: REVIEW OF HEALTH MAINTENANCE PROTOCOL ORDERS,         norgestim-eth estrad triphasic (TRI-SPRINTEC)         0.18/0.215/0.25 MG-35 MCG tablet        Refills per epicare     (L30.1) " Dyshidrotic eczema  Comment:   Plan: triamcinolone (KENALOG) 0.1 % external cream        Refills per epicare     (Z00.00) Routine history and physical examination of adult  Comment:   Plan: no labs needed this year    (M89.8X9) Abnormal bone formation  Comment: wonder about bruise on bone years ago and now ossified   Plan: XR Tibia and Fibula Right 2 Views              Patient has been advised of split billing requirements and indicates understanding: Yes      COUNSELING:  Reviewed preventive health counseling, as reflected in patient instructions  Special attention given to:        Regular exercise       Healthy diet/nutrition       Contraception       Colorectal Cancer Screening        She reports that she has never smoked. She has never used smokeless tobacco.      Madison Turner MD  [facility]

## 2023-06-05 ENCOUNTER — OFFICE VISIT (OUTPATIENT)
Dept: FAMILY MEDICINE | Facility: CLINIC | Age: 38
End: 2023-06-05
Payer: COMMERCIAL

## 2023-06-05 VITALS
BODY MASS INDEX: 24.58 KG/M2 | TEMPERATURE: 98.2 F | WEIGHT: 156.6 LBS | OXYGEN SATURATION: 99 % | HEIGHT: 67 IN | RESPIRATION RATE: 12 BRPM | HEART RATE: 84 BPM | SYSTOLIC BLOOD PRESSURE: 107 MMHG | DIASTOLIC BLOOD PRESSURE: 73 MMHG

## 2023-06-05 DIAGNOSIS — J01.00 ACUTE NON-RECURRENT MAXILLARY SINUSITIS: Primary | ICD-10-CM

## 2023-06-05 PROCEDURE — 99213 OFFICE O/P EST LOW 20 MIN: CPT | Performed by: FAMILY MEDICINE

## 2023-06-05 RX ORDER — AZITHROMYCIN 250 MG/1
TABLET, FILM COATED ORAL
Qty: 6 TABLET | Refills: 0 | Status: SHIPPED | OUTPATIENT
Start: 2023-06-05 | End: 2023-06-10

## 2023-06-05 ASSESSMENT — ENCOUNTER SYMPTOMS: COUGH: 1

## 2023-06-05 NOTE — PROGRESS NOTES
Assessment & Plan   Problem List Items Addressed This Visit    None  Visit Diagnoses     Acute non-recurrent maxillary sinusitis    -  Primary    Given length of symptoms and worsening and no longer responding to OTCs, will move to treatment as per orders.  Side effects precautions reviewed    Relevant Medications    azithromycin (ZITHROMAX) 250 MG tablet            Follow-up in 7 to 10 days if not improved, sooner if warning signs symptoms as discussed.    Montrell Carias DO  [facility] is a 37 year old, presenting for the following health issues:  Sinus Problem (Pain/Pressure x4 days)        6/5/2023    10:44 AM   Additional Questions   Roomed by MAC Rashid   Accompanied by Self         6/5/2023    10:44 AM   Patient Reported Additional Medications   Patient reports taking the following new medications N/A     Sinus pressure and fullness started about 10 days ago.  At first she was having a sore throat as well as congestion and as well as a sinus pressure.  Over the past week that the slight cough, nasal drainage and sore throat have resolved but for the past 4 days the pressure in the maxillary sinus has worsened.  Was taking DayQuil and NyQuil that in the beginning was giving relief but now for the past 4 days is not giving her relief.  Showering causes some relief but not full.  No bloody noses.  No change in hearing or vision.    Sinus Problem   This is a new problem. The current episode started more than 2 days ago. The problem has been gradually worsening. There has been no fever. The pain is at a severity of 5/10. The pain is moderate. The pain has been fluctuating since onset. Associated symptoms include congestion, ear pain and cough. Treatments tried: Daquil. The treatment provided mild relief.   History of Present Illness       Reason for visit:  Possible sinus infection  Symptom onset:  3-7 days ago  Symptoms include:  Runny nose, cough, congestion  "and facial pressure  Symptom intensity:  Moderate  Symptom progression:  Improving  Had these symptoms before:  Yes  Has tried/received treatment for these symptoms:  No    She eats 2-3 servings of fruits and vegetables daily.She consumes 1 sweetened beverage(s) daily.She exercises with enough effort to increase her heart rate 10 to 19 minutes per day.  She exercises with enough effort to increase her heart rate 3 or less days per week.   She is taking medications regularly.         Review of Systems   HENT: Positive for congestion and ear pain.    Respiratory: Positive for cough.    All other systems reviewed and are negative.         Objective    /73 (BP Location: Left arm, Patient Position: Sitting, Cuff Size: Adult Large)   Pulse 84   Temp 98.2  F (36.8  C) (Oral)   Resp 12   Ht 1.689 m (5' 6.5\")   Wt 71 kg (156 lb 9.6 oz)   LMP 05/21/2023 (Exact Date)   SpO2 99%   Breastfeeding No   BMI 24.90 kg/m    Body mass index is 24.9 kg/m .  Physical Exam  Vitals and nursing note reviewed.   Constitutional:       General: She is not in acute distress.     Appearance: Normal appearance. She is not ill-appearing.   HENT:      Head: Normocephalic and atraumatic.      Comments: Tender to pressure in the right and left maxillary sinuses with right worse than left.  Some mild tenderness in the right frontal but none in the left.     Right Ear: Tympanic membrane, ear canal and external ear normal.      Left Ear: Tympanic membrane, ear canal and external ear normal.      Nose: Nose normal.      Mouth/Throat:      Pharynx: No oropharyngeal exudate or posterior oropharyngeal erythema.   Eyes:      Extraocular Movements: Extraocular movements intact.      Conjunctiva/sclera: Conjunctivae normal.   Cardiovascular:      Rate and Rhythm: Normal rate and regular rhythm.      Pulses: Normal pulses.      Heart sounds: Normal heart sounds.   Pulmonary:      Effort: Pulmonary effort is normal.      Breath sounds: Normal " breath sounds. No wheezing, rhonchi or rales.   Musculoskeletal:      Cervical back: Normal range of motion.      Right lower leg: No edema.      Left lower leg: No edema.   Lymphadenopathy:      Cervical: Cervical adenopathy present.   Skin:     Capillary Refill: Capillary refill takes less than 2 seconds.   Neurological:      Mental Status: She is alert and oriented to person, place, and time.   Psychiatric:         Attention and Perception: Attention normal.         Mood and Affect: Mood normal.         Speech: Speech normal.         Thought Content: Thought content normal.

## 2023-06-06 NOTE — TELEPHONE ENCOUNTER
Patient calling office back to let us know that she spoke with [employer] and they told her in order for her BOTOX procedure that was done on 1/18/2023 to be covered, she needs to have a pre op done prior.  Explained to patient that a pre op would not be required for a procedure done in the office, only for BOTOX in the OR.  Patient verbalized understanding.  Informed her that I will give the information to my manager for attention.     Provider E-Visit time total (minutes): 3

## 2023-10-20 ENCOUNTER — OFFICE VISIT (OUTPATIENT)
Dept: MIDWIFE SERVICES | Facility: CLINIC | Age: 38
End: 2023-10-20
Payer: COMMERCIAL

## 2023-10-20 VITALS — WEIGHT: 162 LBS | BODY MASS INDEX: 25.76 KG/M2 | SYSTOLIC BLOOD PRESSURE: 104 MMHG | DIASTOLIC BLOOD PRESSURE: 76 MMHG

## 2023-10-20 DIAGNOSIS — Z12.4 SCREENING FOR MALIGNANT NEOPLASM OF CERVIX: Primary | ICD-10-CM

## 2023-10-20 PROCEDURE — 87624 HPV HI-RISK TYP POOLED RSLT: CPT | Performed by: ADVANCED PRACTICE MIDWIFE

## 2023-10-20 PROCEDURE — G0145 SCR C/V CYTO,THINLAYER,RESCR: HCPCS | Performed by: ADVANCED PRACTICE MIDWIFE

## 2023-10-20 PROCEDURE — 99202 OFFICE O/P NEW SF 15 MIN: CPT | Performed by: ADVANCED PRACTICE MIDWIFE

## 2023-10-20 NOTE — PROGRESS NOTES
S:  Here for a pap only.  O:  Appears well, no distress  Pelvic Exam:  Vulva: No external lesions, normal hair distribution, no adenopathy  Vagina: Moist, pink, no abnormal discharge, well rugated, no lesions  Cervix: Pap smear is taken, parous, smooth, pink, no visible lesions  Uterus: Normal size, anteverted, non-tender, mobile  Ovaries: No mass, non-tender, mobile  A/P  (Z12.4) Screening for malignant neoplasm of cervix  (primary encounter diagnosis)  Plan: Pap screen with HPV - recommended age 30 - 65         years

## 2023-10-20 NOTE — NURSING NOTE
"Chief Complaint   Patient presents with    Gyn Exam       Initial /76   Wt 73.5 kg (162 lb)   LMP 10/08/2023 (Exact Date)   BMI 25.76 kg/m   Estimated body mass index is 25.76 kg/m  as calculated from the following:    Height as of 23: 1.689 m (5' 6.5\").    Weight as of this encounter: 73.5 kg (162 lb).  BP completed using cuff size: regular    Questioned patient about current smoking habits.  Pt. has never smoked.          The following HM Due: pap smear      Odette Tipton LPN on 10/20/2023 at 10:25 AM           "

## 2023-10-24 LAB
BKR LAB AP GYN ADEQUACY: NORMAL
BKR LAB AP GYN INTERPRETATION: NORMAL
BKR LAB AP HPV REFLEX: NORMAL
BKR LAB AP LMP: NORMAL
BKR LAB AP PREVIOUS ABNORMAL: NORMAL
PATH REPORT.COMMENTS IMP SPEC: NORMAL
PATH REPORT.COMMENTS IMP SPEC: NORMAL
PATH REPORT.RELEVANT HX SPEC: NORMAL

## 2023-10-25 NOTE — ANESTHESIA PROCEDURE NOTES
Patient call:     Appointment type: return endocrine   Provider: Farhat   Return date: reschedule 1/31 appt   Speciality phone number: [phone]  Additional appointment(s) needed:   Additional notes: LVM and sent yanira Tavares on 10/25/2023 at 11:30 AM       Procedure note : epidural catheter  Staff -   Anesthesiologist:  Jose Luis Bagley MD      Performed By: anesthesiologist    Referred By: Boubacar    Pre-Procedure  Performed by Jose Luis Bagley MD  Referred by Boubacar  Location: OB    Procedure Times:5/3/2020 1:07 PM and 5/3/2020 1:18 PM  Pre-Anesthestic Checklist: patient identified, IV checked, risks and benefits discussed, informed consent, monitors and equipment checked, pre-op evaluation and at physician/surgeon's request    Timeout  Correct Patient: Yes   Correct Procedure: Yes   Correct Site: Yes   Correct Laterality: N/A   Correct Position: Yes   Site Marked: N/A   .   Procedure Documentation    .    Procedure: epidural catheter, .   Patient Position:sitting Insertion Site:L3-4  (midline approach) Injection technique: LORT saline   Local skin infiltrated with mL of 1% lidocaine.  SAGE at 6 cm    Patient Prep/Sterile Barriers; mask, sterile gloves, povidone-iodine 7.5% surgical scrub, patient draped.  .  Needle: Touhy needle   Needle Gauge: 17.    Needle Length (Inches) 3.5   # of attempts: 1 and # of redirects:  .    Catheter: 19 G . .  Catheter threaded easily  6 cm epidural space.  12 cm at skin.   .    Assessment/Narrative  Paresthesias: No.  .  .  Aspiration negative for heme or CSF  . Test dose of 3 mL lidocaine 1.5% w/ 1:200,000 epinephrine at 13:14.  Test dose negative for signs of intravascular, subdural or intrathecal injection.

## 2023-10-26 LAB
HUMAN PAPILLOMA VIRUS 16 DNA: NEGATIVE
HUMAN PAPILLOMA VIRUS 18 DNA: NEGATIVE
HUMAN PAPILLOMA VIRUS FINAL DIAGNOSIS: NORMAL
HUMAN PAPILLOMA VIRUS OTHER HR: NEGATIVE

## 2023-12-04 ENCOUNTER — VIRTUAL VISIT (OUTPATIENT)
Dept: INTERNAL MEDICINE | Facility: CLINIC | Age: 38
End: 2023-12-04
Payer: COMMERCIAL

## 2023-12-04 DIAGNOSIS — J01.01 ACUTE RECURRENT MAXILLARY SINUSITIS: Primary | ICD-10-CM

## 2023-12-04 PROCEDURE — 99213 OFFICE O/P EST LOW 20 MIN: CPT | Mod: VID | Performed by: INTERNAL MEDICINE

## 2023-12-04 RX ORDER — PREDNISONE 20 MG/1
20 TABLET ORAL EVERY MORNING
Qty: 4 TABLET | Refills: 0 | Status: SHIPPED | OUTPATIENT
Start: 2023-12-04 | End: 2023-12-08

## 2023-12-04 RX ORDER — GUAIFENESIN 600 MG/1
600 TABLET, EXTENDED RELEASE ORAL 2 TIMES DAILY
Qty: 60 TABLET | Refills: 2 | Status: SHIPPED | OUTPATIENT
Start: 2023-12-04 | End: 2024-12-03

## 2023-12-04 RX ORDER — AZITHROMYCIN 500 MG/1
500 TABLET, FILM COATED ORAL DAILY
Qty: 7 TABLET | Refills: 0 | Status: SHIPPED | OUTPATIENT
Start: 2023-12-04 | End: 2023-12-11

## 2023-12-04 RX ORDER — BENZONATATE 200 MG/1
200 CAPSULE ORAL 3 TIMES DAILY PRN
Qty: 20 CAPSULE | Refills: 1 | Status: SHIPPED | OUTPATIENT
Start: 2023-12-04

## 2023-12-04 NOTE — PROGRESS NOTES
Susan is a 37 year old who is being evaluated via a billable video visit.      How would you like to obtain your AVS? Mail a copy  If the video visit is dropped, the invitation should be resent by: Text to cell phone: [phone]  Will anyone else be joining your video visit? No  {If patient encounters technical issues they should call [phone] :473809}        {PROVIDER CHARTING PREFERENCE:973004}    Subjective   Susan is a 37 year old, presenting for the following health issues:  Office Visit (I have had cold symptoms (possible sinus infection) for a few weeks now. I have a cough (producing mucas) that I can't seem to get rid of. This morning I was coughing so hard I almost vomited.)      12/4/2023    12:29 PM   Additional Questions   Roomed by Martha GALVAN       History of Present Illness       Reason for visit:  Cough  Symptom onset:  3-4 weeks ago  Symptoms include:  Cough  Symptom intensity:  Moderate  Symptom progression:  Improving  Had these symptoms before:  No    She eats 2-3 servings of fruits and vegetables daily.She consumes 1 sweetened beverage(s) daily.She exercises with enough effort to increase her heart rate 10 to 19 minutes per day.  She exercises with enough effort to increase her heart rate 3 or less days per week.   She is taking medications regularly.       {SUPERLIST (Optional):353582}  {additonal problems for provider to add (Optional):332926}      Review of Systems   {ROS COMP (Optional):753842}      Objective    Vitals - Patient Reported  Systolic (Patient Reported):  (does not monitor)  Pain Score: No Pain (0)        Physical Exam   {video visit exam brief selected:128951}    {Diagnostic Test Results (Optional):859261}    {AMBULATORY ATTESTATION (Optional):596649}        Video-Visit Details    Type of service:  Video Visit     Originating Location (pt. Location): Home  {PROVIDER LOCATION On-site should be selected for visits conducted from your clinic location or adjoining [facility]  hospital, academic office, or other nearby [facility] building. Off-site should be selected for all other provider locations, including home:208409}  Distant Location (provider location):  On-site  Platform used for Video Visit: Crow

## 2023-12-04 NOTE — PROGRESS NOTES
Susan is a 37 year old female contacting the clinic today via video, who will use the platform: Other for the visit.  Phone # for [facility], or if [facility] drops:   Telephone Information:   Mobile [phone]          ASSESSMENT and PLAN:  1. Acute recurrent maxillary sinusitis  Sent antibiotics based on history  - azithromycin (ZITHROMAX) 500 MG tablet; Take 1 tablet (500 mg) by mouth daily for 7 days  Dispense: 7 tablet; Refill: 0  - predniSONE (DELTASONE) 20 MG tablet; Take 1 tablet (20 mg) by mouth every morning for 4 days  Dispense: 4 tablet; Refill: 0  - benzonatate (TESSALON) 200 MG capsule; Take 1 capsule (200 mg) by mouth 3 times daily as needed for cough  Dispense: 20 capsule; Refill: 1  - guaiFENesin (MUCINEX) 600 MG 12 hr tablet; Take 1 tablet (600 mg) by mouth 2 times daily  Dispense: 60 tablet; Refill: 2    MyChart communication and advice sent             Answers submitted by the patient for this visit:  General Questionnaire (Submitted on 12/4/2023)  Chief Complaint: Chronic problems general questions HPI Form  How many servings of fruits and vegetables do you eat daily?: 2-3  On average, how many sweetened beverages do you drink each day (Examples: soda, juice, sweet tea, etc.  Do NOT count diet or artificially sweetened beverages)?: 1  How many minutes a day do you exercise enough to make your heart beat faster?: 10 to 19  How many days a week do you exercise enough to make your heart beat faster?: 3 or less  How many days per week do you miss taking your medication?: 0  General Concern (Submitted on 12/4/2023)  Chief Complaint: Chronic problems general questions HPI Form  What is the reason for your visit today?: Cough  When did your symptoms begin?: 3-4 weeks ago  What are your symptoms?: Cough  How would you describe these symptoms?: Moderate  Are your symptoms:: Improving  Have you had these symptoms before?: No

## 2024-01-09 ENCOUNTER — E-VISIT (OUTPATIENT)
Dept: URGENT CARE | Facility: CLINIC | Age: 39
End: 2024-01-09
Payer: COMMERCIAL

## 2024-01-09 DIAGNOSIS — N39.0 ACUTE UTI (URINARY TRACT INFECTION): Primary | ICD-10-CM

## 2024-01-09 PROCEDURE — 99421 OL DIG E/M SVC 5-10 MIN: CPT | Performed by: NURSE PRACTITIONER

## 2024-01-09 RX ORDER — NITROFURANTOIN 25; 75 MG/1; MG/1
100 CAPSULE ORAL 2 TIMES DAILY
Qty: 10 CAPSULE | Refills: 0 | Status: SHIPPED | OUTPATIENT
Start: 2024-01-09 | End: 2024-01-14

## 2024-01-09 NOTE — PATIENT INSTRUCTIONS
Dear Susan Rubalcava    After reviewing your responses, I've been able to diagnose you with a urinary tract infection, which is a common infection of the bladder with bacteria.  This is not a sexually transmitted infection, though urinating immediately after intercourse can help prevent infections.  Drinking lots of fluids is also helpful to clear your current infection and prevent the next one.      I have sent a prescription for antibiotics to your pharmacy to treat this infection.    It is important that you take all of your prescribed medication even if your symptoms are improving after a few doses.  Taking all of your medicine helps prevent the symptoms from returning.     If your symptoms worsen, you develop pain in your back or stomach, develop fevers, or are not improving in 5 days, please contact your primary care provider for an appointment or visit any of our convenient Walk-in or Urgent Care Centers to be seen, which can be found on our website here.    Thanks again for choosing us as your health care partner,    CARITO Healy CNP  Urinary Tract Infection (UTI) in Women: Care Instructions  Overview     A urinary tract infection, or UTI, is a general term for an infection anywhere between the kidneys and the urethra (where urine comes out). Most UTIs are bladder infections. They often cause pain or burning when you urinate.  UTIs are caused by bacteria and can be cured with antibiotics. Be sure to complete your treatment so that the infection does not get worse.  Follow-up care is a key part of your treatment and safety. Be sure to make and go to all appointments, and call your doctor if you are having problems. It's also a good idea to know your test results and keep a list of the medicines you take.  How can you care for yourself at home?  Take your antibiotics as directed. Do not stop taking them just because you feel better. You need to take the full course of antibiotics.  Drink extra  "water and other fluids for the next day or two. This will help make the urine less concentrated and help wash out the bacteria that are causing the infection. (If you have kidney, heart, or liver disease and have to limit fluids, talk with your doctor before you increase the amount of fluids you drink.)  Avoid drinks that are carbonated or have caffeine. They can irritate the bladder.  Urinate often. Try to empty your bladder each time.  To relieve pain, take a hot bath or lay a heating pad set on low over your lower belly or genital area. Never go to sleep with a heating pad in place.  To prevent UTIs  Drink plenty of water each day. This helps you urinate often, which clears bacteria from your system. (If you have kidney, heart, or liver disease and have to limit fluids, talk with your doctor before you increase the amount of fluids you drink.)  Urinate when you need to.  If you are sexually active, urinate right after you have sex.  Change sanitary pads often.  Avoid douches, bubble baths, feminine hygiene sprays, and other feminine hygiene products that have deodorants.  After going to the bathroom, wipe from front to back.  When should you call for help?   Call your doctor now or seek immediate medical care if:    Symptoms such as fever, chills, nausea, or vomiting get worse or appear for the first time.     You have new pain in your back just below your rib cage. This is called flank pain.     There is new blood or pus in your urine.     You have any problems with your antibiotic medicine.   Watch closely for changes in your health, and be sure to contact your doctor if:    You are not getting better after taking an antibiotic for 2 days.     Your symptoms go away but then come back.   Where can you learn more?  Go to https://www.healthwise.net/patiented  Enter K848 in the search box to learn more about \"Urinary Tract Infection (UTI) in Women: Care Instructions.\"  Current as of: February 28, " 2023               Content Version: 13.8    [id number] [employer].   Care instructions adapted under license by your healthcare professional. If you have questions about a medical condition or this instruction, always ask your healthcare professional. [employer] disclaims any warranty or liability for your use of this information.

## 2024-02-11 ENCOUNTER — TELEPHONE (OUTPATIENT)
Dept: URGENT CARE | Facility: URGENT CARE | Age: 39
End: 2024-02-11

## 2024-02-11 ENCOUNTER — LAB (OUTPATIENT)
Dept: LAB | Facility: CLINIC | Age: 39
End: 2024-02-11
Payer: COMMERCIAL

## 2024-02-11 ENCOUNTER — E-VISIT (OUTPATIENT)
Dept: URGENT CARE | Facility: CLINIC | Age: 39
End: 2024-02-11
Payer: COMMERCIAL

## 2024-02-11 DIAGNOSIS — N30.00 ACUTE CYSTITIS WITHOUT HEMATURIA: Primary | ICD-10-CM

## 2024-02-11 DIAGNOSIS — R30.0 DYSURIA: ICD-10-CM

## 2024-02-11 DIAGNOSIS — R30.0 DYSURIA: Primary | ICD-10-CM

## 2024-02-11 LAB
ALBUMIN UR-MCNC: NEGATIVE MG/DL
APPEARANCE UR: CLEAR
BACTERIA #/AREA URNS HPF: ABNORMAL /HPF
BILIRUB UR QL STRIP: NEGATIVE
COLOR UR AUTO: YELLOW
GLUCOSE UR STRIP-MCNC: NEGATIVE MG/DL
HCG UR QL: NEGATIVE
HGB UR QL STRIP: ABNORMAL
KETONES UR STRIP-MCNC: NEGATIVE MG/DL
LEUKOCYTE ESTERASE UR QL STRIP: ABNORMAL
NITRATE UR QL: NEGATIVE
PH UR STRIP: 6 [PH] (ref 5–7)
RBC #/AREA URNS AUTO: ABNORMAL /HPF
SP GR UR STRIP: 1.02 (ref 1–1.03)
SQUAMOUS #/AREA URNS AUTO: ABNORMAL /LPF
UROBILINOGEN UR STRIP-ACNC: 0.2 E.U./DL
WBC #/AREA URNS AUTO: ABNORMAL /HPF

## 2024-02-11 PROCEDURE — 87086 URINE CULTURE/COLONY COUNT: CPT

## 2024-02-11 PROCEDURE — 87088 URINE BACTERIA CULTURE: CPT

## 2024-02-11 PROCEDURE — 81001 URINALYSIS AUTO W/SCOPE: CPT

## 2024-02-11 PROCEDURE — 81025 URINE PREGNANCY TEST: CPT

## 2024-02-11 PROCEDURE — 99421 OL DIG E/M SVC 5-10 MIN: CPT | Performed by: PHYSICIAN ASSISTANT

## 2024-02-11 PROCEDURE — 87186 SC STD MICRODIL/AGAR DIL: CPT

## 2024-02-11 RX ORDER — NITROFURANTOIN 25; 75 MG/1; MG/1
100 CAPSULE ORAL 2 TIMES DAILY
Qty: 10 CAPSULE | Refills: 0 | Status: SHIPPED | OUTPATIENT
Start: 2024-02-11 | End: 2024-02-16

## 2024-02-11 NOTE — PATIENT INSTRUCTIONS
Dear Susan Rubalcava,     After reviewing your responses, I would like you to come in for a urine test to make sure we treat you correctly. This urine test is to evaluate you for a possible urinary tract infection, and should be scheduled for today or tomorrow. Schedule a Lab Only appointment here.     Lab appointments are not available at most locations on the weekends. If no Lab Only appointment is available, you should be seen in any of our convenient Walk-in or Urgent Care Centers, which can be found on our website here.     You will receive instructions with your results in [employer] once they are available.     If your symptoms worsen, you develop pain in your back or stomach, develop fevers, or are not improving in 5 days, please contact your primary care provider for an appointment or visit a Walk-in or Urgent Care Center to be seen.     Thanks again for choosing us as your health care partner,     Pawel Berry PA-C

## 2024-02-13 LAB
BACTERIA UR CULT: ABNORMAL
BACTERIA UR CULT: ABNORMAL

## 2024-04-22 ENCOUNTER — PATIENT OUTREACH (OUTPATIENT)
Dept: CARE COORDINATION | Facility: CLINIC | Age: 39
End: 2024-04-22
Payer: COMMERCIAL

## 2024-04-30 ENCOUNTER — E-VISIT (OUTPATIENT)
Dept: URGENT CARE | Facility: CLINIC | Age: 39
End: 2024-04-30
Payer: COMMERCIAL

## 2024-04-30 DIAGNOSIS — J01.90 ACUTE SINUSITIS, RECURRENCE NOT SPECIFIED, UNSPECIFIED LOCATION: Primary | ICD-10-CM

## 2024-04-30 PROCEDURE — 99421 OL DIG E/M SVC 5-10 MIN: CPT | Performed by: NURSE PRACTITIONER

## 2024-06-14 DIAGNOSIS — Z30.011 ENCOUNTER FOR INITIAL PRESCRIPTION OF CONTRACEPTIVE PILLS: ICD-10-CM

## 2024-06-14 RX ORDER — NORGESTIMATE AND ETHINYL ESTRADIOL 7DAYSX3 28
1 KIT ORAL DAILY
Qty: 84 TABLET | Refills: 0 | Status: SHIPPED | OUTPATIENT
Start: 2024-06-14 | End: 2024-09-09

## 2024-08-17 ENCOUNTER — HEALTH MAINTENANCE LETTER (OUTPATIENT)
Age: 39
End: 2024-08-17

## 2024-09-09 DIAGNOSIS — Z30.011 ENCOUNTER FOR INITIAL PRESCRIPTION OF CONTRACEPTIVE PILLS: ICD-10-CM

## 2024-09-09 RX ORDER — NORGESTIMATE AND ETHINYL ESTRADIOL 7DAYSX3 28
1 KIT ORAL DAILY
Qty: 84 TABLET | Refills: 0 | Status: SHIPPED | OUTPATIENT
Start: 2024-09-09

## 2024-10-15 ENCOUNTER — E-VISIT (OUTPATIENT)
Dept: URGENT CARE | Facility: CLINIC | Age: 39
End: 2024-10-15
Payer: COMMERCIAL

## 2024-10-15 DIAGNOSIS — R21 RASH: Primary | ICD-10-CM

## 2024-10-15 PROCEDURE — 99207 PR NON-BILLABLE SERV PER CHARTING: CPT | Performed by: FAMILY MEDICINE

## 2024-10-16 ENCOUNTER — E-VISIT (OUTPATIENT)
Dept: URGENT CARE | Facility: CLINIC | Age: 39
End: 2024-10-16
Payer: COMMERCIAL

## 2024-10-16 ENCOUNTER — OFFICE VISIT (OUTPATIENT)
Dept: FAMILY MEDICINE | Facility: CLINIC | Age: 39
End: 2024-10-16
Payer: COMMERCIAL

## 2024-10-16 VITALS
BODY MASS INDEX: 26.89 KG/M2 | OXYGEN SATURATION: 99 % | TEMPERATURE: 98.2 F | HEIGHT: 67 IN | WEIGHT: 171.3 LBS | RESPIRATION RATE: 12 BRPM | SYSTOLIC BLOOD PRESSURE: 105 MMHG | DIASTOLIC BLOOD PRESSURE: 69 MMHG | HEART RATE: 74 BPM

## 2024-10-16 DIAGNOSIS — H02.849 SWELLING OF EYELID, UNSPECIFIED LATERALITY: Primary | ICD-10-CM

## 2024-10-16 PROCEDURE — 99213 OFFICE O/P EST LOW 20 MIN: CPT | Mod: 25 | Performed by: GENERAL PRACTICE

## 2024-10-16 PROCEDURE — 90656 IIV3 VACC NO PRSV 0.5 ML IM: CPT | Performed by: GENERAL PRACTICE

## 2024-10-16 PROCEDURE — 99207 PR NON-BILLABLE SERV PER CHARTING: CPT | Performed by: NURSE PRACTITIONER

## 2024-10-16 PROCEDURE — 90471 IMMUNIZATION ADMIN: CPT | Performed by: GENERAL PRACTICE

## 2024-10-16 RX ORDER — PREDNISONE 10 MG/1
10 TABLET ORAL DAILY
Qty: 4 TABLET | Refills: 0 | Status: SHIPPED | OUTPATIENT
Start: 2024-10-16 | End: 2024-10-20

## 2024-10-16 RX ORDER — ERYTHROMYCIN 5 MG/G
0.5 OINTMENT OPHTHALMIC 4 TIMES DAILY
Qty: 3.5 G | Refills: 0 | Status: SHIPPED | OUTPATIENT
Start: 2024-10-16 | End: 2024-10-23

## 2024-10-16 RX ORDER — OLOPATADINE HYDROCHLORIDE 1 MG/ML
1 SOLUTION/ DROPS OPHTHALMIC 2 TIMES DAILY
Qty: 5 ML | Refills: 0 | Status: SHIPPED | OUTPATIENT
Start: 2024-10-16 | End: 2024-10-23

## 2024-10-16 ASSESSMENT — PAIN SCALES - GENERAL: PAINLEVEL: NO PAIN (0)

## 2024-10-16 ASSESSMENT — ENCOUNTER SYMPTOMS: EYE PAIN: 1

## 2024-10-16 NOTE — PROGRESS NOTES
"  Assessment & Plan     Swelling of eyelid, unspecified laterality  ? Allergic reaction  Discussed if worsening/eye pain/blurry vision follow-up with ophthalmology/clinic  - olopatadine (PATANOL) 0.1 % ophthalmic solution; Place 1 drop into both eyes 2 times daily for 7 days.  - predniSONE (DELTASONE) 10 MG tablet; Take 1 tablet (10 mg) by mouth daily for 4 days.  - erythromycin (ROMYCIN) 5 MG/GM ophthalmic ointment; Place 0.5 inches into both eyes 4 times daily for 7 days.  - Adult Allergy/Asthma  Referral; Future  - Adult Eye  Referral; Future          BMI  Estimated body mass index is 27.23 kg/m  as calculated from the following:    Height as of this encounter: 1.689 m (5' 6.5\").    Weight as of this encounter: 77.7 kg (171 lb 4.8 oz).             Anayeli Davis is a 38 year old, presenting for the following health issues:  Eye Problem (Swollen eyelids, itchy)        10/16/2024     1:50 PM   Additional Questions   Roomed by Greta BLAIR MA   Accompanied by self         10/16/2024     1:50 PM   Patient Reported Additional Medications   Patient reports taking the following new medications allegra ovee the counter for eye       Swollen eyelid  Has been having swelling in the eyelid, itchiness in the eyelid and surrounding skin. No drainage. Vision intact.   Intermittent edema in one or both eyes with mascara, 1-2 x per year.  Usually does not last this long.   No new products    History of Present Illness       Reason for visit:  Swollen eyelids and itchy skin around eyes  Symptom onset:  1-2 weeks ago  Symptoms include:  Swollen, red itchy eyelids and skin around them  Symptom intensity:  Moderate  Symptom progression:  Staying the same  Had these symptoms before:  No  What makes it better:  Taking Allegra helps a little. Using aquaphor helps   She is taking medications regularly.                 Review of Systems  Constitutional, HEENT, cardiovascular, pulmonary, gi and gu systems are negative, " "except as otherwise noted.      Objective    /69 (BP Location: Right arm, Patient Position: Sitting, Cuff Size: Adult Large)   Pulse 74   Temp 98.2  F (36.8  C) (Oral)   Resp 12   Ht 1.689 m (5' 6.5\")   Wt 77.7 kg (171 lb 4.8 oz)   LMP 10/06/2024   SpO2 99%   BMI 27.23 kg/m    Body mass index is 27.23 kg/m .  Physical Exam  Constitutional:       Appearance: Normal appearance.   HENT:      Right Ear: Tympanic membrane normal.      Left Ear: Tympanic membrane normal.   Eyes:      Extraocular Movements: Extraocular movements intact.      Comments: Slight right eyelid edema, no drainage, no conjunctivitis   Saw picture of swollen eyelid this morning   Cardiovascular:      Rate and Rhythm: Normal rate and regular rhythm.   Pulmonary:      Effort: Pulmonary effort is normal.      Breath sounds: Normal breath sounds.   Abdominal:      Palpations: Abdomen is soft.   Musculoskeletal:         General: Normal range of motion.      Cervical back: Normal range of motion.   Skin:     General: Skin is warm.   Neurological:      General: No focal deficit present.      Mental Status: She is alert and oriented to person, place, and time. Mental status is at baseline.   Psychiatric:         Mood and Affect: Mood normal.         Behavior: Behavior normal.         Thought Content: Thought content normal.         Judgment: Judgment normal.                    Signed Electronically by: Clementina Hallman MD    "

## 2024-10-16 NOTE — PATIENT INSTRUCTIONS
Dear Susan Rubalcava,    We are sorry you are not feeling well. Based on the responses you provided, it is recommended that you be seen in-person in urgent care so we can better evaluate your symptoms. Please click here to find the nearest urgent care location to you.   You will not be charged for this Visit. Thank you for trusting us with your care.    Jer Shaver MD

## 2024-10-21 ENCOUNTER — MYC MEDICAL ADVICE (OUTPATIENT)
Dept: FAMILY MEDICINE | Facility: CLINIC | Age: 39
End: 2024-10-21
Payer: COMMERCIAL

## 2024-10-21 DIAGNOSIS — H02.849 SWELLING OF EYELID, UNSPECIFIED LATERALITY: Primary | ICD-10-CM

## 2024-10-22 RX ORDER — PREDNISONE 10 MG/1
10 TABLET ORAL DAILY
Qty: 4 TABLET | Refills: 0 | Status: SHIPPED | OUTPATIENT
Start: 2024-10-22 | End: 2024-10-26

## 2024-11-18 ENCOUNTER — PATIENT OUTREACH (OUTPATIENT)
Dept: CARE COORDINATION | Facility: CLINIC | Age: 39
End: 2024-11-18
Payer: COMMERCIAL

## 2024-12-02 DIAGNOSIS — Z30.011 ENCOUNTER FOR INITIAL PRESCRIPTION OF CONTRACEPTIVE PILLS: ICD-10-CM

## 2024-12-02 RX ORDER — NORGESTIMATE AND ETHINYL ESTRADIOL 7DAYSX3 28
1 KIT ORAL DAILY
Qty: 84 TABLET | Refills: 0 | Status: SHIPPED | OUTPATIENT
Start: 2024-12-02

## 2024-12-03 NOTE — PROGRESS NOTES
"SPIRITUAL HEALTH SERVICES Progress Note  [facility] - Tele care    Spiritual Health visit per length of stay.  Spoke with pt spouse, Randolph, by phone due to Covid 19 visit restrictions.  Randolph shared about context and noted optimism about progress.  Josselyn have great family support and are looking forward to the time when they are \"done with quarantine\" so they can see the baby.    Oriented to Spiritual Health Services and availability for emotional/spiritual support during hospital stay.               Plan: Spiritual Health Services remains available for additional emotional/spiritual support.    Jose Luis Duffy MA  Staff   Pager: [phone]  Phone: [phone]         " Received message that patient's spouse, Alana wanted a call back in reference to internal referral to [facility]. Called spouse and let her know that our IPM department is not currently accepting new Medicaid patients d/t limited access for those appointments. Provided her with the MAC number and my contact information if she needs help with anything else. Spouse v/u and was very appreciative.  Angela Quijano, RN

## 2025-02-10 ENCOUNTER — E-VISIT (OUTPATIENT)
Dept: FAMILY MEDICINE | Facility: CLINIC | Age: 40
End: 2025-02-10
Payer: COMMERCIAL

## 2025-02-10 DIAGNOSIS — G57.01 PIRIFORMIS SYNDROME, RIGHT: Primary | ICD-10-CM

## 2025-02-10 RX ORDER — CYCLOBENZAPRINE HCL 10 MG
10 TABLET ORAL 3 TIMES DAILY PRN
Qty: 30 TABLET | Refills: 0 | Status: SHIPPED | OUTPATIENT
Start: 2025-02-10

## 2025-02-10 RX ORDER — NAPROXEN 500 MG/1
500 TABLET ORAL 2 TIMES DAILY WITH MEALS
Qty: 60 TABLET | Refills: 1 | Status: SHIPPED | OUTPATIENT
Start: 2025-02-10

## 2025-02-10 NOTE — PATIENT INSTRUCTIONS
Mini Relaxation Exercises  Source www.tobaccofreeu.org    Mini relaxation exercises are focused breathing techniques that help reduce  anxiety and tension immediately. You can do them with your eyes open or  closed. You can do them any place, at any time; no one will know that you are  doing them.    Good Times to Do a  Mini     Before taking an exam    While at the computer lab waiting for your document to print    While waiting in line    When someone says something that bothers you    While waiting for the announcement of a grade    While waiting for a phone call    In the dentist s chair    When you feel overwhelmed by what you need to accomplish in the near  future    When in pain    When you want to     Mini Version 1- Breath Focused  Position yourself in a supportive comfortable chair or lying in a position that is least painful. (Often this is on your back with pillows under your knees)    Count very slowly to yourself from ten down to zero, one number for  each breath. Thus, with the first diaphragmatic breath, you say  ten  to  yourself, with the next breath, you say  nine , etc. If you start feeling  light-headed or dizzy, slow down the counting. When you get to  zero ,  see how you are feeling. If you are feeling better, great! If not, try to  do it again.    b. As you inhale, count very slowly up to four; as you exhale, count slowly  back down to one. Thus, as you inhale, you say to yourself  one, two,  three, four , as you exhale, you say to yourself  four, three, two, one .  Do this several times.    c. After each inhalation, pause for a few seconds; after you exhale, pause  again for a few seconds. Do this for several breaths.    Mini Version 2- Physical Focused  a. Massage your forehead, jaw, back of neck, and shoulders  b. Stretch and yawn  c. Walk counting four paces as you breathe in and four paces as you  breathe out  d. Coordinate your breath with any activity, for example, writing,  jogging,  drawing, lifting weights, walking, etc.    Mini Version 3- Imagery Focused  Position yourself in a supportive comfortable chair or lying in a position that is least painful. (Often this is on your back with pillows under your knees)    a. Briefly picture yourself in a place you find relaxing  b. Contemplate a picture of a natural scene  c. Imagine the characteristics of one of the following or any other object  which portrays characteristics you find calming or supportive in the  moment:    Tree (strong, rooted, expansive)    Mountain (timeless, strong, stable)    Waves (fluid, limitless)    Sun (radiant, warm)    Wind (free)    Mini Version 4- Mindfulness Focused  a. Look out the window for a few moments  b. Notice something new  c. Listen. What is the most distant sound you hear? The next distant?  d. Appreciate the sensation of being in the situation, the feel, smell, sight of  certain objects  e. Focus on being exactly where you are, keeping your thoughts from flowing  into the future or past      Stress Reduction on the Fly    When most people think of stress reduction they think of activities like meditation, getting a massage, or going to the gym.  While these types of activities are great for stress reduction and lifestyle improvement, they often require time, effort or financial commitments that can become barriers for some people.  There are things, however, that you can do, without special training, equipment, or setting aside a lot of time in your day that will help relieve stress, and increase your ability to cope with life s ups and downs.  Here are some simple suggestions, perhaps you can add a few of your own.   3 breath meditation. Breathe in and out using your diaphragm (if you don t already know how to do this we can show you how) Three deep breaths. Focus your attention on the feeling of the air going in and out through your nose.  Just for those three breaths focus only on the  sensation of breathing. If something else comes into your mind just come back to the sensation of your breathing.   Stretch and yawn  Laugh   Recall a funny scene from your favorite movie. Caution: laughing  with  works better than laughing  at .  Look at or remember something beautiful  Arrange your commute so you go through the park. Visit a neighborhood flower shop just to smell the roses. Try to look at a single snowflake.  Find the classical station on the radio. What kinds of art do you like? Why?  Imagine    a. Briefly picture yourself in a place you find relaxing  b. Contemplate a picture of a natural scene  c. Imagine the characteristics of one of the following or any other object  which portrays characteristics you find calming or supportive in the  moment:  Tree (strong, rooted, expansive)  Mountain (timeless, strong, stable)  Waves (fluid, limitless)  Sun (radiant, warm)  Wind (free)    Be present in the moment    a. Look out the window for a few moments  b. Notice something new  c. Listen. What is the most distant sound you hear? The next distant?  d. Appreciate the sensation of being in the situation, the feel, smell, sight of  certain objects  e. Focus on being exactly where you are, keeping your thoughts from flowing  into the future or past    If your pain does not improve over the next 3-4 weeks, I would like to see you in the clinic and get an xray and consider physical therapy.    Let me  know if you would like a physical therapy referral now as that would be okay as well

## 2025-02-18 ENCOUNTER — OFFICE VISIT (OUTPATIENT)
Dept: ALLERGY | Facility: CLINIC | Age: 40
End: 2025-02-18
Attending: GENERAL PRACTICE
Payer: COMMERCIAL

## 2025-02-18 VITALS
BODY MASS INDEX: 26.87 KG/M2 | SYSTOLIC BLOOD PRESSURE: 117 MMHG | HEART RATE: 68 BPM | OXYGEN SATURATION: 98 % | DIASTOLIC BLOOD PRESSURE: 73 MMHG | WEIGHT: 169 LBS

## 2025-02-18 DIAGNOSIS — H02.849 SWELLING OF EYELID, UNSPECIFIED LATERALITY: ICD-10-CM

## 2025-02-18 DIAGNOSIS — R21 RASH: Primary | ICD-10-CM

## 2025-02-18 NOTE — LETTER
2/18/2025      Susan Rubalcava  [address] [place] [address]      Dear Colleague,    Thank you for referring your patient, Susan Rubalcava, to the [facility] [place]. Please see a copy of my visit note below.    Susan Rubalcava was seen in the Allergy Clinic at [facility].    Susan Rubalcava is a 39 year old female being seen today at the request of Clementina Hallman MD/[facility] in consultation for Swelling of eyelid.    In October she developed eyelid swelling as well as itching which was quite significant.  She went to urgent care 2 different occasions and prednisone was quite effective.  10 mg daily for 4 days resolved the rash, and then the swelling returned after discontinuation and prednisone was repeated once and the rash and swelling resolved.  She does not recall having any specific products or medications that she is concerned about causing her symptoms.  She does not recall an illness around this time.  She had no significant allergy symptoms such as runny nose or sneezing or nasal drainage.  She does not recall changing any topical products or taking over-the-counter medications such as NSAIDs.    Starting in November she has a new problem with red wine within 1-2 sips causing a rash around the eyes and also neck.  Some white cliff are also causing this problem.  She found that Gisela white cliff have been tolerated as well as Ciders.  She would get itching of the eyes and mouth and blotchy skin with the red wine.    Within 30 minutes the rash would resolve.  This is different than the rash around the eyes that lasted for 3 weeks back in October.      Past Medical History:   Diagnosis Date     ASCUS with positive high risk HPV 9/06    + HPV 16 colp - CANDIE I     Cervical high risk HPV (human papillomavirus) test positive 2006, '08,'13,'14,'16,'17-'19     Contact dermatitis and other eczema, due to unspecified cause      ZORAN  (hemolytic anemia/elev liver enzymes/low platelets in pregnancy) 2020    and chorio on placenta path - thick mec at C section     LSIL (low grade squamous intraepithelial lesion) on Pap smear , 10/13, ,     +high risk HPV, CANDIE 1 on pathology x 3     Mild dysplasia of cervix 2019    repeat pap with HPV in one year     Rheumatoid arthritis of multiple sites with negative rheumatoid factor (H) 2004    Senior year of high school and first year of college and no symptoms since then.     Family History   Problem Relation Age of Onset     Thyroid Disease Mother      Seizure Disorder Mother      Prostate Cancer Father      Prostate Problems Father 61        Prostate Cancer     Breast Cancer Maternal Grandmother 80     Diabetes Maternal Grandfather         in70's     Heart Disease Paternal Grandmother      Rheumatoid Arthritis Paternal Grandmother      Cerebrovascular Disease Paternal Grandfather      Alzheimer Disease Paternal Grandfather      Mental Illness Cousin      C.A.D. No family hx of      Cancer - colorectal No family hx of      Past Surgical History:   Procedure Laterality Date      SECTION N/A 2020      Tom Molina MD;  thick mec and chorio on placental path      COLPOSCOPY  2019    mild dysplasia with L Mcmanus - repeat pap with HPV in one year     DILATION AND CURETTAGE SUCTION WITH ULTRASOUND GUIDANCE N/A 6/15/2020    Procedure: ULTRASOUND GUIDED SUCTION DILATION AND CURETTAGE, REPAIR OF CERVICAL LASCERATION;  Surgeon: Becca Zepeda DO;  Location: [facility] OR       ENVIRONMENTAL HISTORY:   Pets inside the house include 1 dog(s).  Do you smoke cigarettes or other recreational drugs? No There is/are 0 smokers living in the house. The house do not have a damp basement.     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      Review of Systems      Current Outpatient Medications:      benzonatate (TESSALON) 200 MG  capsule, Take 1 capsule (200 mg) by mouth 3 times daily as needed for cough, Disp: 20 capsule, Rfl: 1     cyclobenzaprine (FLEXERIL) 10 MG tablet, Take 1 tablet (10 mg) by mouth 3 times daily as needed for muscle spasms., Disp: 30 tablet, Rfl: 0     naproxen (NAPROSYN) 500 MG tablet, Take 1 tablet (500 mg) by mouth 2 times daily (with meals)., Disp: 60 tablet, Rfl: 1     TRI-SPRINTEC 0.18/0.215/0.25 MG-35 MCG tablet, TAKE 1 TABLET BY MOUTH DAILY, Disp: 84 tablet, Rfl: 0     triamcinolone (ARISTOCORT HP) 0.5 % external cream, Apply topically 2 times daily Do not fill now - just add refills, Disp: 80 g, Rfl: 3     triamcinolone (KENALOG) 0.1 % external cream, Apply topically 2 times daily, Disp: 30 g, Rfl: 2  Allergies   Allergen Reactions     Oxycodone Rash     Amoxicillin Hives         EXAM:   /73   Pulse 68   Wt 76.7 kg (169 lb)   SpO2 98%   BMI 26.87 kg/m      Physical Exam    Constitutional:       General: She is not in acute distress.     Appearance: Normal appearance. She is not ill-appearing.   HENT:      Head: Normocephalic and atraumatic.   Eyes:      General:         Right eye: No discharge.         Left eye: No discharge.   Skin:     General: Skin is warm.      Findings: No erythema or rash.   Neurological:      General: No focal deficit present.      Mental Status: She is alert. Mental status is at baseline.   Psychiatric:         Mood and Affect: Mood normal.         Behavior: Behavior normal.      WORKUP: Skin testing was negative for allergens present in October such as mold and weeds.    ENVIRONMENTAL PERCUTANEOUS SKIN TESTING: ADULT      2/18/2025     3:00 PM   [facility]   Consent Y   Ordering Physician Dr. France   Interpreting Physician Dr. France   Testing Technician Eri MORALEZ RN   Location Arm   Time start: 15:20   Time End: 15:35   Positive Control: Histatrol*ALK 1 mg/ml 5/20   Negative Control: 50% Glycerin 0   Cocklebur (W/F in millimeters) 0   Careless (W/F in  millimeters) 0   Nettle (W/F in millimeters) 0   English Plantain (W/F in millimeters) 0   Kochia (W/F in millimeters) 0   Lamb's Quarter (W/F in millimeters) 0   Marshelder (W/F in millimeters) 0   Ragweed Mix* ALK (W/F in millimeters) 0   Russian Thistle (W/F in millimeters) 0   Sagebrush/Mugwort (W/F in millimeters) 0   Sheep Sorrel (W/F in millimeters) 0   Feather Mix* ALK (W/F in millimeters) 0   Penicillium Mix (1:10 w/v) 0   Curvularia spicifera (1:10 w/v) 0   Epicoccum (1:10 w/v) 0   Aspergillus fumigatus (1:10 w/v): 0   Alternaria tenius (1:10 w/v) 0   H. Cladosporium (1:10 w/v) 0   Phoma herbarum (1:10 w/v) 0         ASSESSMENT/PLAN:  Susan Rubalcava is a 39 year old female seen today for a rash around her eyes that developed in October.  No evidence of allergic conjunctivitis which we assessed via skin testing.  Also having problems with certain cliff and she wonders if sulfites are the cause.  We do not have testing available for sulfites.  Recommended to continue with the products that she is not having rashes with since we do not have good testing for alcohol related products.    Recommended to reach out to us in the future if she redevelops the rash around her eyes.  May consider low-dose prednisone which was effective such as 10 mg.  May consider referral to dermatology for patch testing if symptoms recur.    Follow-up if needed      Thank you for allowing me to participate in the care of Susan Rubalcava.      I spent 30 minutes on the date of the encounter doing chart review, history and exam, documentation and further coordination as noted above exclusive of separately reported interpretations    Ta France MD  Allergy/Immunology  [facility]      Per provider verbal order, placed Positive and Negative controls, Weeds Panel, Feather and Molds Panel, Careless, Cocklebur and Nettle scratch test.  Verbal consent was obtained by MD prior to procedure.  Once panels were  placed, patient was monitored for 15 minutes in clinic.  Provider read test after 15 minutes.  Pt tolerated procedure well.  All questions and concerns were addressed at office visit.      Eri Bagley RN      Again, thank you for allowing me to participate in the care of your patient.        Sincerely,        Ta France MD    Electronically signed

## 2025-02-18 NOTE — PROGRESS NOTES
Susan Rubalcava was seen in the Allergy Clinic at [facility].    Susan Rubalcava is a 39 year old female being seen today at the request of Clementina Hallman MD/[facility] in consultation for Swelling of eyelid.    In October she developed eyelid swelling as well as itching which was quite significant.  She went to urgent care 2 different occasions and prednisone was quite effective.  10 mg daily for 4 days resolved the rash, and then the swelling returned after discontinuation and prednisone was repeated once and the rash and swelling resolved.  She does not recall having any specific products or medications that she is concerned about causing her symptoms.  She does not recall an illness around this time.  She had no significant allergy symptoms such as runny nose or sneezing or nasal drainage.  She does not recall changing any topical products or taking over-the-counter medications such as NSAIDs.    Starting in November she has a new problem with red wine within 1-2 sips causing a rash around the eyes and also neck.  Some white cliff are also causing this problem.  She found that Gisela white cliff have been tolerated as well as Ciders.  She would get itching of the eyes and mouth and blotchy skin with the red wine.    Within 30 minutes the rash would resolve.  This is different than the rash around the eyes that lasted for 3 weeks back in October.      Past Medical History:   Diagnosis Date    ASCUS with positive high risk HPV 9/06    + HPV 16 colp - CANDIE I    Cervical high risk HPV (human papillomavirus) test positive 2006, '08,'13,'14,'16,'17-'19    Contact dermatitis and other eczema, due to unspecified cause     HELLP (hemolytic anemia/elev liver enzymes/low platelets in pregnancy) 05/04/2020    and chorio on placenta path - thick mec at C section    LSIL (low grade squamous intraepithelial lesion) on Pap smear 8/07, 10/13, 7/14, 8/14    +high risk HPV, CANDIE 1 on  pathology x 3    Mild dysplasia of cervix 2019    repeat pap with HPV in one year    Rheumatoid arthritis of multiple sites with negative rheumatoid factor (H) 2004    Senior year of high school and first year of college and no symptoms since then.     Family History   Problem Relation Age of Onset    Thyroid Disease Mother     Seizure Disorder Mother     Prostate Cancer Father     Prostate Problems Father 61        Prostate Cancer    Breast Cancer Maternal Grandmother 80    Diabetes Maternal Grandfather         in70's    Heart Disease Paternal Grandmother     Rheumatoid Arthritis Paternal Grandmother     Cerebrovascular Disease Paternal Grandfather     Alzheimer Disease Paternal Grandfather     Mental Illness Cousin     C.A.D. No family hx of     Cancer - colorectal No family hx of      Past Surgical History:   Procedure Laterality Date     SECTION N/A 2020      Tom Molina MD;  thick mec and chorio on placental path     COLPOSCOPY  2019    mild dysplasia with L Mcmanus - repeat pap with HPV in one year    DILATION AND CURETTAGE SUCTION WITH ULTRASOUND GUIDANCE N/A 6/15/2020    Procedure: ULTRASOUND GUIDED SUCTION DILATION AND CURETTAGE, REPAIR OF CERVICAL LASCERATION;  Surgeon: Becca Zepeda DO;  Location: [facility] OR       ENVIRONMENTAL HISTORY:   Pets inside the house include 1 dog(s).  Do you smoke cigarettes or other recreational drugs? No There is/are 0 smokers living in the house. The house do not have a damp basement.     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      Review of Systems      Current Outpatient Medications:     benzonatate (TESSALON) 200 MG capsule, Take 1 capsule (200 mg) by mouth 3 times daily as needed for cough, Disp: 20 capsule, Rfl: 1    cyclobenzaprine (FLEXERIL) 10 MG tablet, Take 1 tablet (10 mg) by mouth 3 times daily as needed for muscle spasms., Disp: 30 tablet, Rfl: 0    naproxen (NAPROSYN) 500  Kalina Cedeno MD MG tablet, Take 1 tablet (500 mg) by mouth 2 times daily (with meals)., Disp: 60 tablet, Rfl: 1    TRI-SPRINTEC 0.18/0.215/0.25 MG-35 MCG tablet, TAKE 1 TABLET BY MOUTH DAILY, Disp: 84 tablet, Rfl: 0    triamcinolone (ARISTOCORT HP) 0.5 % external cream, Apply topically 2 times daily Do not fill now - just add refills, Disp: 80 g, Rfl: 3    triamcinolone (KENALOG) 0.1 % external cream, Apply topically 2 times daily, Disp: 30 g, Rfl: 2  Allergies   Allergen Reactions    Oxycodone Rash    Amoxicillin Hives         EXAM:   /73   Pulse 68   Wt 76.7 kg (169 lb)   SpO2 98%   BMI 26.87 kg/m      Physical Exam    Constitutional:       General: She is not in acute distress.     Appearance: Normal appearance. She is not ill-appearing.   HENT:      Head: Normocephalic and atraumatic.   Eyes:      General:         Right eye: No discharge.         Left eye: No discharge.   Skin:     General: Skin is warm.      Findings: No erythema or rash.   Neurological:      General: No focal deficit present.      Mental Status: She is alert. Mental status is at baseline.   Psychiatric:         Mood and Affect: Mood normal.         Behavior: Behavior normal.      WORKUP: Skin testing was negative for allergens present in October such as mold and weeds.    ENVIRONMENTAL PERCUTANEOUS SKIN TESTING: ADULT      2/18/2025     3:00 PM   [facility]   Consent Y   Ordering Physician Dr. France   Interpreting Physician Dr. France   Testing Technician Eri MORALEZ RN   Location Arm   Time start: 15:20   Time End: 15:35   Positive Control: Histatrol*ALK 1 mg/ml 5/20   Negative Control: 50% Glycerin 0   Cocklebur (W/F in millimeters) 0   Careless (W/F in millimeters) 0   Nettle (W/F in millimeters) 0   English Plantain (W/F in millimeters) 0   Kochia (W/F in millimeters) 0   Lamb's Quarter (W/F in millimeters) 0   Marshelder (W/F in millimeters) 0   Ragweed Mix* ALK (W/F in millimeters) 0   Russian Thistle (W/F in millimeters) 0    Sagebrush/Mugwort (W/F in millimeters) 0   Sheep Sorrel (W/F in millimeters) 0   Feather Mix* ALK (W/F in millimeters) 0   Penicillium Mix (1:10 w/v) 0   Curvularia spicifera (1:10 w/v) 0   Epicoccum (1:10 w/v) 0   Aspergillus fumigatus (1:10 w/v): 0   Alternaria tenius (1:10 w/v) 0   H. Cladosporium (1:10 w/v) 0   Phoma herbarum (1:10 w/v) 0         ASSESSMENT/PLAN:  Susan Rubalcava is a 39 year old female seen today for a rash around her eyes that developed in October.  No evidence of allergic conjunctivitis which we assessed via skin testing.  Also having problems with certain cliff and she wonders if sulfites are the cause.  We do not have testing available for sulfites.  Recommended to continue with the products that she is not having rashes with since we do not have good testing for alcohol related products.    Recommended to reach out to us in the future if she redevelops the rash around her eyes.  May consider low-dose prednisone which was effective such as 10 mg.  May consider referral to dermatology for patch testing if symptoms recur.    Follow-up if needed      Thank you for allowing me to participate in the care of Susan Rubalcava.      I spent 30 minutes on the date of the encounter doing chart review, history and exam, documentation and further coordination as noted above exclusive of separately reported interpretations    Ta France MD  Allergy/Immunology  [facility]

## 2025-02-18 NOTE — PROGRESS NOTES
Per provider verbal order, placed Positive and Negative controls, Weeds Panel, Feather and Molds Panel, Careless, Cocklebur and Nettle scratch test.  Verbal consent was obtained by MD prior to procedure.  Once panels were placed, patient was monitored for 15 minutes in clinic.  Provider read test after 15 minutes.  Pt tolerated procedure well.  All questions and concerns were addressed at office visit.      Eri Bagley RN

## 2025-02-23 ENCOUNTER — MYC REFILL (OUTPATIENT)
Dept: FAMILY MEDICINE | Facility: CLINIC | Age: 40
End: 2025-02-23
Payer: COMMERCIAL

## 2025-02-23 DIAGNOSIS — Z30.011 ENCOUNTER FOR INITIAL PRESCRIPTION OF CONTRACEPTIVE PILLS: ICD-10-CM

## 2025-02-24 DIAGNOSIS — Z30.011 ENCOUNTER FOR INITIAL PRESCRIPTION OF CONTRACEPTIVE PILLS: ICD-10-CM

## 2025-02-24 RX ORDER — NORGESTIMATE AND ETHINYL ESTRADIOL 7DAYSX3 28
1 KIT ORAL DAILY
Qty: 84 TABLET | Refills: 0 | OUTPATIENT
Start: 2025-02-24

## 2025-02-24 RX ORDER — NORGESTIMATE AND ETHINYL ESTRADIOL 7DAYSX3 28
1 KIT ORAL DAILY
Qty: 84 TABLET | Refills: 1 | Status: SHIPPED | OUTPATIENT
Start: 2025-02-24

## 2025-06-02 ENCOUNTER — PATIENT OUTREACH (OUTPATIENT)
Dept: CARE COORDINATION | Facility: CLINIC | Age: 40
End: 2025-06-02
Payer: COMMERCIAL

## 2025-06-04 ENCOUNTER — PATIENT OUTREACH (OUTPATIENT)
Dept: CARE COORDINATION | Facility: CLINIC | Age: 40
End: 2025-06-04
Payer: COMMERCIAL

## 2025-08-07 DIAGNOSIS — Z30.011 ENCOUNTER FOR INITIAL PRESCRIPTION OF CONTRACEPTIVE PILLS: ICD-10-CM

## 2025-08-07 RX ORDER — NORGESTIMATE AND ETHINYL ESTRADIOL 7DAYSX3 28
1 KIT ORAL DAILY
Qty: 84 TABLET | Refills: 0 | Status: SHIPPED | OUTPATIENT
Start: 2025-08-07

## 2025-08-19 SDOH — HEALTH STABILITY: PHYSICAL HEALTH: ON AVERAGE, HOW MANY DAYS PER WEEK DO YOU ENGAGE IN MODERATE TO STRENUOUS EXERCISE (LIKE A BRISK WALK)?: 3 DAYS

## 2025-08-19 SDOH — HEALTH STABILITY: PHYSICAL HEALTH: ON AVERAGE, HOW MANY MINUTES DO YOU ENGAGE IN EXERCISE AT THIS LEVEL?: 10 MIN

## 2025-08-19 ASSESSMENT — SOCIAL DETERMINANTS OF HEALTH (SDOH): HOW OFTEN DO YOU GET TOGETHER WITH FRIENDS OR RELATIVES?: ONCE A WEEK

## 2025-08-20 ENCOUNTER — OFFICE VISIT (OUTPATIENT)
Dept: FAMILY MEDICINE | Facility: CLINIC | Age: 40
End: 2025-08-20
Payer: COMMERCIAL

## 2025-08-20 VITALS
TEMPERATURE: 97.8 F | HEART RATE: 81 BPM | SYSTOLIC BLOOD PRESSURE: 117 MMHG | HEIGHT: 66 IN | OXYGEN SATURATION: 99 % | BODY MASS INDEX: 27.74 KG/M2 | WEIGHT: 172.6 LBS | DIASTOLIC BLOOD PRESSURE: 69 MMHG | RESPIRATION RATE: 18 BRPM

## 2025-08-20 DIAGNOSIS — Z30.011 ENCOUNTER FOR INITIAL PRESCRIPTION OF CONTRACEPTIVE PILLS: ICD-10-CM

## 2025-08-20 DIAGNOSIS — Z83.49 FAMILY HISTORY OF THYROID DISEASE: ICD-10-CM

## 2025-08-20 DIAGNOSIS — Z00.00 ROUTINE GENERAL MEDICAL EXAMINATION AT A HEALTH CARE FACILITY: Primary | ICD-10-CM

## 2025-08-20 DIAGNOSIS — O99.019 ANEMIA DURING PREGNANCY: ICD-10-CM

## 2025-08-20 DIAGNOSIS — Z12.31 ENCOUNTER FOR SCREENING MAMMOGRAM FOR BREAST CANCER: ICD-10-CM

## 2025-08-20 DIAGNOSIS — Z83.3 FAMILY HISTORY OF DIABETES MELLITUS: ICD-10-CM

## 2025-08-20 DIAGNOSIS — Z13.1 SCREENING FOR DIABETES MELLITUS: ICD-10-CM

## 2025-08-20 LAB
CHOLEST SERPL-MCNC: 214 MG/DL
EST. AVERAGE GLUCOSE BLD GHB EST-MCNC: 111 MG/DL
FASTING STATUS PATIENT QL REPORTED: ABNORMAL
HBA1C MFR BLD: 5.5 % (ref 0–5.6)
HDLC SERPL-MCNC: 56 MG/DL
LDLC SERPL CALC-MCNC: 123 MG/DL
NONHDLC SERPL-MCNC: 158 MG/DL
TRIGL SERPL-MCNC: 176 MG/DL
TSH SERPL DL<=0.005 MIU/L-ACNC: 1.46 UIU/ML (ref 0.3–4.2)

## 2025-08-20 RX ORDER — TACROLIMUS 1 MG/G
OINTMENT TOPICAL
COMMUNITY
Start: 2024-11-13

## 2025-08-20 RX ORDER — NORGESTIMATE AND ETHINYL ESTRADIOL 7DAYSX3 28
1 KIT ORAL DAILY
Qty: 84 TABLET | Refills: 4 | Status: SHIPPED | OUTPATIENT
Start: 2025-08-20

## (undated) DEVICE — GLOVE PROTEXIS BLUE W/NEU-THERA 6.5  2D73EB65

## (undated) DEVICE — SYR 10ML SLIP TIP W/O NDL 303134

## (undated) DEVICE — DRSG ABDOMINAL 07 1/2X8" 7197D

## (undated) DEVICE — LINEN TOWEL PACK X10 5473

## (undated) DEVICE — GLOVE PROTEXIS BLUE W/NEU-THERA 7.0  2D73EB70

## (undated) DEVICE — ESU GROUND PAD ADULT W/CORD E7507

## (undated) DEVICE — TUBING SUCTION VACUUM COLLECTION 6FT 610

## (undated) DEVICE — PREP POVIDONE IODINE SCRUB 7.5% 120ML

## (undated) DEVICE — BAG CLEAR TRASH 1.3M 39X33" P4040C

## (undated) DEVICE — SU MONOCRYL 0 CT 36" Y358H

## (undated) DEVICE — STPL SKIN SUBCUTICULAR INSORB  2030

## (undated) DEVICE — GLOVE PROTEXIS W/NEU-THERA 6.5  2D73TE65

## (undated) DEVICE — Device

## (undated) DEVICE — LINEN HALF SHEET 5512

## (undated) DEVICE — SUCTION CANNULA UTERINE 07MM CVD  21853

## (undated) DEVICE — GLOVE PROTEXIS MICRO 7.0  2D73PM70

## (undated) DEVICE — LINEN FULL SHEET 5511

## (undated) DEVICE — FILTER BERKLEY SAFETOUCH

## (undated) DEVICE — CAP BABY PINK/BLUE IC-2

## (undated) DEVICE — TRANSFER DEVICE BLOOD NDL HOLDER 364880

## (undated) DEVICE — PREP POVIDONE IODINE SOLUTION 10% 120ML

## (undated) DEVICE — SU VICRYL 2-0 CT-1 27" UND J259H

## (undated) DEVICE — PACK C-SECTION LF PL15OTA83B

## (undated) DEVICE — STOCKING SLEEVE VASOPRESS COMPRESSION CALF MED 18" VP501M

## (undated) DEVICE — SOL WATER IRRIG 1000ML BOTTLE 2F7114

## (undated) DEVICE — BARRIER SEPRAFILM 5X6" SINGLE SHEET 4301-02

## (undated) DEVICE — SU VICRYL 0 UR-6 27" J603H

## (undated) DEVICE — BLADE CLIPPER SGL USE 9680

## (undated) DEVICE — LINEN TOWEL PACK X5 5464

## (undated) DEVICE — GLOVE PROTEXIS POWDER FREE SMT 6.0  2D72PT60X

## (undated) DEVICE — GLOVE PROTEXIS POWDER FREE SMT 6.5  2D72PT65X

## (undated) DEVICE — SU VICRYL 0 CT-1 36" J346H

## (undated) DEVICE — ADH SKIN CLOSURE PREMIERPRO EXOFIN 1.0ML 3470

## (undated) DEVICE — PAD CHUX UNDERPAD 30X36" P3036C

## (undated) DEVICE — SOL NACL 0.9% IRRIG 1000ML BOTTLE 2F7124

## (undated) DEVICE — CATH TRAY FOLEY SURESTEP 16FR DRAIN BAG STATOCK A899916

## (undated) DEVICE — PREP SKIN SCRUB TRAY 4461A

## (undated) DEVICE — SUCTION CANISTER MEDIVAC LINER 3000ML W/LID 65651-530

## (undated) DEVICE — CATH INTERMITTENT CLEAN-CATH FEMALE 14FR 6" VINYL LF 420614

## (undated) DEVICE — LINEN BABY BLANKET 5434

## (undated) DEVICE — PACK MINOR LITHOTOMY RIDGES

## (undated) DEVICE — SPECIMEN TRAP VACUUM SUCTION 003984-901

## (undated) RX ORDER — OXYTOCIN/0.9 % SODIUM CHLORIDE 30/500 ML
PLASTIC BAG, INJECTION (ML) INTRAVENOUS
Status: DISPENSED
Start: 2020-05-04

## (undated) RX ORDER — MORPHINE SULFATE 1 MG/ML
INJECTION, SOLUTION EPIDURAL; INTRATHECAL; INTRAVENOUS
Status: DISPENSED
Start: 2020-05-04

## (undated) RX ORDER — FENTANYL CITRATE 50 UG/ML
INJECTION, SOLUTION INTRAMUSCULAR; INTRAVENOUS
Status: DISPENSED
Start: 2020-06-15

## (undated) RX ORDER — ONDANSETRON 2 MG/ML
INJECTION INTRAMUSCULAR; INTRAVENOUS
Status: DISPENSED
Start: 2020-05-04

## (undated) RX ORDER — DOXYCYCLINE 100 MG/10ML
INJECTION, POWDER, LYOPHILIZED, FOR SOLUTION INTRAVENOUS
Status: DISPENSED
Start: 2020-06-15

## (undated) RX ORDER — KETOROLAC TROMETHAMINE 30 MG/ML
INJECTION, SOLUTION INTRAMUSCULAR; INTRAVENOUS
Status: DISPENSED
Start: 2020-06-15

## (undated) RX ORDER — FENTANYL CITRATE 50 UG/ML
INJECTION, SOLUTION INTRAMUSCULAR; INTRAVENOUS
Status: DISPENSED
Start: 2020-05-04

## (undated) RX ORDER — LABETALOL 20 MG/4 ML (5 MG/ML) INTRAVENOUS SYRINGE
Status: DISPENSED
Start: 2020-05-04

## (undated) RX ORDER — ACETAMINOPHEN 325 MG/1
TABLET ORAL
Status: DISPENSED
Start: 2020-06-15